# Patient Record
Sex: FEMALE | Race: WHITE | NOT HISPANIC OR LATINO | Employment: FULL TIME | URBAN - METROPOLITAN AREA
[De-identification: names, ages, dates, MRNs, and addresses within clinical notes are randomized per-mention and may not be internally consistent; named-entity substitution may affect disease eponyms.]

---

## 2017-07-04 ENCOUNTER — HOSPITAL ENCOUNTER (EMERGENCY)
Facility: MEDICAL CENTER | Age: 47
End: 2017-07-04
Attending: EMERGENCY MEDICINE
Payer: MEDICAID

## 2017-07-04 VITALS
TEMPERATURE: 98.2 F | HEART RATE: 71 BPM | HEIGHT: 63 IN | DIASTOLIC BLOOD PRESSURE: 68 MMHG | OXYGEN SATURATION: 98 % | SYSTOLIC BLOOD PRESSURE: 115 MMHG | RESPIRATION RATE: 14 BRPM

## 2017-07-04 DIAGNOSIS — M54.41 ACUTE BILATERAL LOW BACK PAIN WITH BILATERAL SCIATICA: ICD-10-CM

## 2017-07-04 DIAGNOSIS — M54.42 ACUTE BILATERAL LOW BACK PAIN WITH BILATERAL SCIATICA: ICD-10-CM

## 2017-07-04 PROCEDURE — 99283 EMERGENCY DEPT VISIT LOW MDM: CPT

## 2017-07-04 RX ORDER — CYCLOBENZAPRINE HCL 10 MG
10 TABLET ORAL 3 TIMES DAILY PRN
Qty: 30 TAB | Refills: 0 | Status: SHIPPED | OUTPATIENT
Start: 2017-07-04 | End: 2017-10-25 | Stop reason: SDUPTHER

## 2017-07-04 RX ORDER — METHYLPREDNISOLONE 4 MG/1
TABLET ORAL
Qty: 1 KIT | Refills: 0 | Status: SHIPPED | OUTPATIENT
Start: 2017-07-04 | End: 2017-08-23

## 2017-07-04 RX ORDER — TRAMADOL HYDROCHLORIDE 50 MG/1
50 TABLET ORAL EVERY 4 HOURS PRN
Qty: 30 TAB | Refills: 0 | Status: SHIPPED | OUTPATIENT
Start: 2017-07-04 | End: 2017-09-27 | Stop reason: SDUPTHER

## 2017-07-04 ASSESSMENT — PAIN SCALES - GENERAL: PAINLEVEL_OUTOF10: 7

## 2017-07-04 NOTE — ED AVS SNAPSHOT
StyleSaint Access Code: Activation code not generated  Current StyleSaint Status: Active    [employer]  A secure, online tool to manage your health information     [employer]’s StyleSaint® is a secure, online tool that connects you to your personalized health information from the privacy of your home -- day or night - making it very easy for you to manage your healthcare. Once the activation process is completed, you can even access your medical information using the StyleSaint aldair, which is available for free in the Apple [employer]air store or Google Play store.     StyleSaint provides the following levels of access (as shown below):   My Chart Features   [facility] Primary Care Doctor [facility]  Specialists [facility]  Urgent  Care Non-[facility]  Primary Care  Doctor   Email your healthcare team securely and privately 24/7 X X X X   Manage appointments: schedule your next appointment; view details of past/upcoming appointments X      Request prescription refills. X      View recent personal medical records, including lab and immunizations X X X X   View health record, including health history, allergies, medications X X X X   Read reports about your outpatient visits, procedures, consult and ER notes X X X X   See your discharge summary, which is a recap of your hospital and/or ER visit that includes your diagnosis, lab results, and care plan. X X       How to register for StyleSaint:  1. Go to  [email or web address].  2. Click on the Sign Up Now box, which takes you to the New Member Sign Up page. You will need to provide the following information:  a. Enter your StyleSaint Access Code exactly as it appears at the top of this page. (You will not need to use this code after you’ve completed the sign-up process. If you do not sign up before the expiration date, you must request a new code.)   b. Enter your date of birth.   c. Enter your home email address.   d. Click Submit, and follow the next screen’s instructions.  3. Create a StyleSaint ID. This will  be your [employer] login ID and cannot be changed, so think of one that is secure and easy to remember.  4. Create a [employer] password. You can change your password at any time.  5. Enter your Password Reset Question and Answer. This can be used at a later time if you forget your password.   6. Enter your e-mail address. This allows you to receive e-mail notifications when new information is available in [employer].  7. Click Sign Up. You can now view your health information.    For assistance activating your [employer] account, call [phone]

## 2017-07-04 NOTE — ED NOTES
Pt c/o low back pain s/p fall down stairs this past Saturday , hx of back surgery , 2009 , pt ambulatory to triage , pt drove self to ER

## 2017-07-04 NOTE — ED NOTES
Patient given discharge instructions, follow up information, and prescriptions x 3, verbalized understanding, ambulatory out of ED w/steady gait.

## 2017-07-04 NOTE — ED AVS SNAPSHOT
7/4/2017    Kristina Celaya Arun Random  [address] [place] [address]    Dear Kristina:    [facility] wants to ensure your discharge home is safe and you or your loved ones have had all of your questions answered regarding your care after you leave the hospital.    Below is a list of resources and contact information should you have any questions regarding your hospital stay, follow-up instructions, or active medical symptoms.    Questions or Concerns Regarding… Contact   Medical Questions Related to Your Discharge  (7 days a week, 8am-5pm) Contact a Nurse Care Coordinator   [phone]   Medical Questions Not Related to Your Discharge  (24 hours a day / 7 days a week)  Contact the Nurse Health Line   [phone]    Medications or Discharge Instructions Refer to your discharge packet   or contact your [facility] Primary Care Provider   [phone]   Follow-up Appointment(s) Schedule your appointment via [employer]   or contact Scheduling [phone]   Billing Review your statement via [employer]  or contact Billing [phone]   Medical Records Review your records via [employer]   or contact Medical Records [phone]     You may receive a telephone call within two days of discharge. This call is to make certain you understand your discharge instructions and have the opportunity to have any questions answered. You can also easily access your medical information, test results and upcoming appointments via the [employer] free online health management tool. You can learn more and sign up at [employer]. For assistance setting up your [employer] account, please call [phone].    Once again, we want to ensure your discharge home is safe and that you have a clear understanding of any next steps in your care. If you have any questions or concerns, please do not hesitate to contact us, we are here for you. Thank you for choosing [facility] for your healthcare needs.    Sincerely,    Your [facility] Healthcare Team

## 2017-07-04 NOTE — DISCHARGE INSTRUCTIONS
Sciatica  Sciatica is pain, weakness, numbness, or tingling along the path of the sciatic nerve. The nerve starts in the lower back and runs down the back of each leg. The nerve controls the muscles in the lower leg and in the back of the knee, while also providing sensation to the back of the thigh, lower leg, and the sole of your foot. Sciatica is a symptom of another medical condition. For instance, nerve damage or certain conditions, such as a herniated disk or bone spur on the spine, pinch or put pressure on the sciatic nerve. This causes the pain, weakness, or other sensations normally associated with sciatica. Generally, sciatica only affects one side of the body.  CAUSES   · Herniated or slipped disc.  · Degenerative disk disease.  · A pain disorder involving the narrow muscle in the buttocks (piriformis syndrome).  · Pelvic injury or fracture.  · Pregnancy.  · Tumor (rare).  SYMPTOMS   Symptoms can vary from mild to very severe. The symptoms usually travel from the low back to the buttocks and down the back of the leg. Symptoms can include:  · Mild tingling or dull aches in the lower back, leg, or hip.  · Numbness in the back of the calf or sole of the foot.  · Burning sensations in the lower back, leg, or hip.  · Sharp pains in the lower back, leg, or hip.  · Leg weakness.  · Severe back pain inhibiting movement.  These symptoms may get worse with coughing, sneezing, laughing, or prolonged sitting or standing. Also, being overweight may worsen symptoms.  DIAGNOSIS   Your caregiver will perform a physical exam to look for common symptoms of sciatica. He or she may ask you to do certain movements or activities that would trigger sciatic nerve pain. Other tests may be performed to find the cause of the sciatica. These may include:  · Blood tests.  · X-rays.  · Imaging tests, such as an MRI or CT scan.  TREATMENT   Treatment is directed at the cause of the sciatic pain. Sometimes, treatment is not necessary  and the pain and discomfort goes away on its own. If treatment is needed, your caregiver may suggest:  · Over-the-counter medicines to relieve pain.  · Prescription medicines, such as anti-inflammatory medicine, muscle relaxants, or narcotics.  · Applying heat or ice to the painful area.  · Steroid injections to lessen pain, irritation, and inflammation around the nerve.  · Reducing activity during periods of pain.  · Exercising and stretching to strengthen your abdomen and improve flexibility of your spine. Your caregiver may suggest losing weight if the extra weight makes the back pain worse.  · Physical therapy.  · Surgery to eliminate what is pressing or pinching the nerve, such as a bone spur or part of a herniated disk.  HOME CARE INSTRUCTIONS   · Only take over-the-counter or prescription medicines for pain or discomfort as directed by your caregiver.  · Apply ice to the affected area for 20 minutes, 3-4 times a day for the first 48-72 hours. Then try heat in the same way.  · Exercise, stretch, or perform your usual activities if these do not aggravate your pain.  · Attend physical therapy sessions as directed by your caregiver.  · Keep all follow-up appointments as directed by your caregiver.  · Do not wear high heels or shoes that do not provide proper support.  · Check your mattress to see if it is too soft. A firm mattress may lessen your pain and discomfort.  SEEK IMMEDIATE MEDICAL CARE IF:   · You lose control of your bowel or bladder (incontinence).  · You have increasing weakness in the lower back, pelvis, buttocks, or legs.  · You have redness or swelling of your back.  · You have a burning sensation when you urinate.  · You have pain that gets worse when you lie down or awakens you at night.  · Your pain is worse than you have experienced in the past.  · Your pain is lasting longer than 4 weeks.  · You are suddenly losing weight without reason.  MAKE SURE YOU:  · Understand these  instructions.  · Will watch your condition.  · Will get help right away if you are not doing well or get worse.     This information is not intended to replace advice given to you by your health care provider. Make sure you discuss any questions you have with your health care provider.     Document Released: 12/12/2002 Document Revised: 06/18/2013 Document Reviewed: 04/28/2013  [employer] Interactive Patient Education ©2016 [employer]

## 2017-07-04 NOTE — ED PROVIDER NOTES
ED Provider Note    CHIEF COMPLAINT  Chief Complaint   Patient presents with   • T-5000 FALL   • Back Pain       HPI  Kristina Belia Ash is a 47 y.o. female who presents with report that the patient is moving and became tired and fell down half a flight of stairs this past Saturday. Chart he wears a back brace constantly and has a history of laminectomy back in 2009 by Dr. Gross. She currently drove herself to the emergency department in need of something for pain. She states that she normally has been given Dilaudid in the past for her chronic back pain but is out of this medication for many weeks because she is in between doctors. She no longer has a pain management specialist    ROS  Pertinent negative for distal numbness or paresthesias    PAST MEDICAL HISTORY  Past Medical History   Diagnosis Date   • Dizziness and giddiness    • Cough    • Displacement of lumbar intervertebral disc without myelopathy      right sided sciatica   • Tobacco use disorder    • Bipolar I disorder, most recent episode (or current) unspecified      untreated     • Irregular menstrual cycle      menorrhagia lifelong   • Pneumonia hx.   • Backache S/P L4-5     decompressive laminectomies with bilateral foramionotomies   • Pap smear  1990   • Screening mammogram  1998   • Need for TD vaccine  needed   • Lumbar pain  MRI   9/2008     MRI (9/2008) Left lateral disk protusion L5-S1 with left S1 root compression,small central disk  protusion at the L4-L5 with effacement of the ventral sac and small radial tear of the annulus as well as mild right lateral disk bulge at L4-L5 with neuro foraminal stenosis.   • Osteoarthrosis involving, or with mention of more than one site, but not specified as generalized, multiple sites        FAMILY HISTORY  Family History   Problem Relation Age of Onset   • Diabetes Maternal Grandmother        SOCIAL HISTORY   reports that she has been smoking Cigarettes.  She has a 15 pack-year smoking history. She  "does not have any smokeless tobacco history on file. She reports that she drinks alcohol. She reports that she does not use illicit drugs.    SURGICAL HISTORY  Past Surgical History   Procedure Laterality Date   • Lumbar laminectomy diskectomy  3/18/2009     Performed by RIP GAMA at SURGERY [facility]S   • Tubal coagulation laparoscopic bilateral         CURRENT MEDICATIONS  Home Medications     **Home medications have not yet been reviewed for this encounter**          ALLERGIES  Allergies   Allergen Reactions   • Sulfa Drugs Hives     Causes angioderma and hives       PHYSICAL EXAM  VITAL SIGNS: /67 mmHg  Pulse 98  Temp(Src) 36.8 °C (98.2 °F)  Resp 18  Ht 1.6 m (5' 2.99\")  SpO2 97%  LMP 09/20/2009   Constitutional: Well developed, Well nourished, No acute distress, Non-toxic appearance.   Skin:   Back: Tender lower lumbar spine midline with some paraspinal spasm   Extremities:   Musculoskeletal:   Neurologic: Neurologically intact, positive straight leg raise bilaterally at 20°, normal reflexes, antalgic gait  Psychiatric:       COURSE & MEDICAL DECISION MAKING  Pertinent Labs & Imaging studies reviewed. (See chart for details)  No imaging was required of this patient who has acute on chronic back pain today and does not have any medications at home to take care of pain and spasm. I wrote her for Ultram and along with Medrol Dosepak and Flexeril to treat pain, spasm and inflammation. She is discharged in stable condition with instructions to obtain a primary care provider and return if any significant change in symptoms. She does not have cauda equina syndrome by this presentation    FINAL IMPRESSION  1. Acute bilateral low back pain with bilateral sciatica            Electronically signed by: Geovani Beckwith, 7/4/2017 1:38 PM              "

## 2017-07-04 NOTE — ED AVS SNAPSHOT
Home Care Instructions                                                                                                                Kristina Dias Random   MRN: [id number]    Department:  [facility], Emergency Dept   Date of Visit:  7/4/2017            [facility], Emergency Dept    [address]    [place] [address]    Phone:  [phone]      You were seen by     Geovani Beckwith M.D.      Your Diagnosis Was     Acute bilateral low back pain with bilateral sciatica     M54.42, M54.41       Medication Information     Review all of your home medications and newly ordered medications with your primary doctor and/or pharmacist as soon as possible. Follow medication instructions as directed by your doctor and/or pharmacist.     Please keep your complete medication list with you and share with your physician. Update the information when medications are discontinued, doses are changed, or new medications (including over-the-counter products) are added; and carry medication information at all times in the event of emergency situations.               Medication List      START taking these medications        Instructions    Morning Afternoon Evening Bedtime    cyclobenzaprine 10 MG Tabs   Commonly known as:  FLEXERIL        Take 1 Tab by mouth 3 times a day as needed.   Dose:  10 mg                        MethylPREDNISolone 4 MG Tbpk   Commonly known as:  MEDROL DOSEPAK        Use as directed                        tramadol 50 MG Tabs   Commonly known as:  ULTRAM        Take 1 Tab by mouth every four hours as needed.   Dose:  50 mg                          ASK your doctor about these medications        Instructions    Morning Afternoon Evening Bedtime    hydrocodone-acetaminophen 5-325 MG Tabs per tablet   Commonly known as:  NORCO        Take 1-2 Tabs by mouth every four hours as needed.   Dose:  1-2 Tab                        ondansetron 4 MG Tabs tablet   Commonly known as:   ZOFRAN        Take 1 Tab by mouth every four hours as needed.   Dose:  4 mg                             Where to Get Your Medications      You can get these medications from any pharmacy     Bring a paper prescription for each of these medications    - cyclobenzaprine 10 MG Tabs  - MethylPREDNISolone 4 MG Tbpk  - tramadol 50 MG Tabs              Discharge Instructions       Sciatica  Sciatica is pain, weakness, numbness, or tingling along the path of the sciatic nerve. The nerve starts in the lower back and runs down the back of each leg. The nerve controls the muscles in the lower leg and in the back of the knee, while also providing sensation to the back of the thigh, lower leg, and the sole of your foot. Sciatica is a symptom of another medical condition. For instance, nerve damage or certain conditions, such as a herniated disk or bone spur on the spine, pinch or put pressure on the sciatic nerve. This causes the pain, weakness, or other sensations normally associated with sciatica. Generally, sciatica only affects one side of the body.  CAUSES   · Herniated or slipped disc.  · Degenerative disk disease.  · A pain disorder involving the narrow muscle in the buttocks (piriformis syndrome).  · Pelvic injury or fracture.  · Pregnancy.  · Tumor (rare).  SYMPTOMS   Symptoms can vary from mild to very severe. The symptoms usually travel from the low back to the buttocks and down the back of the leg. Symptoms can include:  · Mild tingling or dull aches in the lower back, leg, or hip.  · Numbness in the back of the calf or sole of the foot.  · Burning sensations in the lower back, leg, or hip.  · Sharp pains in the lower back, leg, or hip.  · Leg weakness.  · Severe back pain inhibiting movement.  These symptoms may get worse with coughing, sneezing, laughing, or prolonged sitting or standing. Also, being overweight may worsen symptoms.  DIAGNOSIS   Your caregiver will perform a physical exam to look for common symptoms  of sciatica. He or she may ask you to do certain movements or activities that would trigger sciatic nerve pain. Other tests may be performed to find the cause of the sciatica. These may include:  · Blood tests.  · X-rays.  · Imaging tests, such as an MRI or CT scan.  TREATMENT   Treatment is directed at the cause of the sciatic pain. Sometimes, treatment is not necessary and the pain and discomfort goes away on its own. If treatment is needed, your caregiver may suggest:  · Over-the-counter medicines to relieve pain.  · Prescription medicines, such as anti-inflammatory medicine, muscle relaxants, or narcotics.  · Applying heat or ice to the painful area.  · Steroid injections to lessen pain, irritation, and inflammation around the nerve.  · Reducing activity during periods of pain.  · Exercising and stretching to strengthen your abdomen and improve flexibility of your spine. Your caregiver may suggest losing weight if the extra weight makes the back pain worse.  · Physical therapy.  · Surgery to eliminate what is pressing or pinching the nerve, such as a bone spur or part of a herniated disk.  HOME CARE INSTRUCTIONS   · Only take over-the-counter or prescription medicines for pain or discomfort as directed by your caregiver.  · Apply ice to the affected area for 20 minutes, 3-4 times a day for the first 48-72 hours. Then try heat in the same way.  · Exercise, stretch, or perform your usual activities if these do not aggravate your pain.  · Attend physical therapy sessions as directed by your caregiver.  · Keep all follow-up appointments as directed by your caregiver.  · Do not wear high heels or shoes that do not provide proper support.  · Check your mattress to see if it is too soft. A firm mattress may lessen your pain and discomfort.  SEEK IMMEDIATE MEDICAL CARE IF:   · You lose control of your bowel or bladder (incontinence).  · You have increasing weakness in the lower back, pelvis, buttocks, or legs.  · You  have redness or swelling of your back.  · You have a burning sensation when you urinate.  · You have pain that gets worse when you lie down or awakens you at night.  · Your pain is worse than you have experienced in the past.  · Your pain is lasting longer than 4 weeks.  · You are suddenly losing weight without reason.  MAKE SURE YOU:  · Understand these instructions.  · Will watch your condition.  · Will get help right away if you are not doing well or get worse.     This information is not intended to replace advice given to you by your health care provider. Make sure you discuss any questions you have with your health care provider.     Document Released: 12/12/2002 Document Revised: 06/18/2013 Document Reviewed: 04/28/2013  [employer] Interactive Patient Education ©2016 [employer]            Patient Information     Patient Information    Following emergency treatment: all patient requiring follow-up care must return either to a private physician or a clinic if your condition worsens before you are able to obtain further medical attention, please return to the emergency room.     Billing Information    At [facility], we work to make the billing process streamlined for our patients.  Our Representatives are here to answer any questions you may have regarding your hospital bill.  If you have insurance coverage and have supplied your insurance information to us, we will submit a claim to your insurer on your behalf.  Should you have any questions regarding your bill, we can be reached online or by phone as follows:  Online: You are able pay your bills online or live chat with our representatives about any billing questions you may have. We are here to help Monday - Friday from 8:00am to 7:30pm and 9:00am - 12:00pm on Saturdays.  Please visit [email or web address]  for more information.   Phone:  [phone] or [phone]    Please note that your emergency physician, surgeon,  pathologist, radiologist, anesthesiologist, and other specialists are not employed by [facility] and will therefore bill separately for their services.  Please contact them directly for any questions concerning their bills at the numbers below:     Emergency Physician Services:  [phone]  [facility]:  [phone]  [facility]:  [phone]  [facility]:  [phone]    1. Your final bill may vary from the amount quoted upon discharge if all procedures are not complete at that time, or if your doctor has additional procedures of which we are not aware. You will receive an additional bill if you return to the Emergency Department at On license of [facility] for suture removal regardless of the facility of which the sutures were placed.     2. Please arrange for settlement of this account at the emergency registration.    3. All self-pay accounts are due in full at the time of treatment.  If you are unable to meet this obligation then payment is expected within 4-5 days.     4. If you have had radiology studies (CT, X-ray, Ultrasound, MRI), you have received a preliminary result during your emergency department visit. Please contact the radiology department [phone] to receive a copy of your final result. Please discuss the Final result with your primary physician or with the follow up physician provided.     Crisis Hotline:  [place] Crisis Hotline:  8-664-XGWBCCD or 1-832.309.7501  [place] Crisis Hotline:    1-573.121.8911 or 721-251-7542         ED Discharge Follow Up Questions    1. In order to provide you with very good care, we would like to follow up with a phone call in the next few days.  May we have your permission to contact you?     YES /  NO    2. What is the best phone number to call you? (       )_____-__________    3. What is the best time to call you?      Morning  /  Afternoon  /  Evening                   Patient Signature:   ____________________________________________________________    Date:  ____________________________________________________________

## 2017-07-20 ENCOUNTER — OFFICE VISIT (OUTPATIENT)
Dept: URGENT CARE | Facility: PHYSICIAN GROUP | Age: 47
End: 2017-07-20
Payer: MEDICAID

## 2017-07-20 VITALS
TEMPERATURE: 97.9 F | SYSTOLIC BLOOD PRESSURE: 108 MMHG | OXYGEN SATURATION: 96 % | DIASTOLIC BLOOD PRESSURE: 66 MMHG | HEIGHT: 63 IN | HEART RATE: 90 BPM

## 2017-07-20 DIAGNOSIS — R42 VERTIGO: ICD-10-CM

## 2017-07-20 PROCEDURE — 99214 OFFICE O/P EST MOD 30 MIN: CPT | Performed by: NURSE PRACTITIONER

## 2017-07-20 RX ORDER — MECLIZINE HYDROCHLORIDE 25 MG/1
25 TABLET ORAL 3 TIMES DAILY PRN
Qty: 30 TAB | Refills: 0 | Status: SHIPPED | OUTPATIENT
Start: 2017-07-20 | End: 2017-08-23

## 2017-07-20 ASSESSMENT — ENCOUNTER SYMPTOMS
FOCAL WEAKNESS: 0
NAUSEA: 0
MYALGIAS: 0
BLURRED VISION: 1
TINGLING: 0
SORE THROAT: 0
NECK PAIN: 0
WEAKNESS: 0
CONSTIPATION: 0
HEADACHES: 1
SENSORY CHANGE: 0
ABDOMINAL PAIN: 0
NERVOUS/ANXIOUS: 0
COUGH: 0
ORTHOPNEA: 0
DOUBLE VISION: 0
SPEECH CHANGE: 0
CHILLS: 0
PALPITATIONS: 0
DEPRESSION: 0
DIZZINESS: 1
VOMITING: 0
HEARTBURN: 0
FEVER: 0
DIARRHEA: 0
PHOTOPHOBIA: 0
EYE REDNESS: 0
EYE PAIN: 0
EYE DISCHARGE: 0
BACK PAIN: 0
SHORTNESS OF BREATH: 0

## 2017-07-21 NOTE — PROGRESS NOTES
"Subjective:      Kristina Ash is a 47 y.o. female who presents with Dizziness            Dizziness  Associated symptoms include headaches. Pertinent negatives include no abdominal pain, chest pain, chills, congestion, coughing, fever, myalgias, nausea, neck pain, sore throat, vomiting or weakness.   Kristina is a 47 year old female who is here for intermittent dizziness x \"months\" with intermittent vision change x 1 episode. States dizziness has worsened in last 2 days. Denies n/v. States dizziness is positional and feels as if she will fall over. Denies HA, weakness, CP, SOB, confusion. States has h/o seasonal allergies but not experiencing nasal congestion or runny nose, c/o dry scratchy throat with intermittent sore throat. Denies fever or recent URI illness. States \"under a huge amount of stress lately\". Denies depression. Takes no prescribed meds at this time or OTC meds for symptoms. Has h/o dizziness. Wears prescription glasses, last eye exam 1 1/2 years ago.    PMH:  has a past medical history of Dizziness and giddiness; Cough; Displacement of lumbar intervertebral disc without myelopathy; Tobacco use disorder; Bipolar I disorder, most recent episode (or current) unspecified; Irregular menstrual cycle; Pneumonia (hx.); Backache (S/P L4-5); Pap smear ( 1990); Screening mammogram ( 1998); Need for TD vaccine ( needed); Lumbar pain ( MRI   9/2008); and Osteoarthrosis involving, or with mention of more than one site, but not specified as generalized, multiple sites.  MEDS:   Current outpatient prescriptions:   •  meclizine (ANTIVERT) 25 MG Tab, Take 1 Tab by mouth 3 times a day as needed for Dizziness., Disp: 30 Tab, Rfl: 0  •  tramadol (ULTRAM) 50 MG Tab, Take 1 Tab by mouth every four hours as needed., Disp: 30 Tab, Rfl: 0  •  cyclobenzaprine (FLEXERIL) 10 MG Tab, Take 1 Tab by mouth 3 times a day as needed., Disp: 30 Tab, Rfl: 0  •  PREDNISOLONE PO, Take  by mouth., Disp: , Rfl:   •  MethylPREDNISolone " "(MEDROL DOSEPAK) 4 MG Tablet Therapy Pack, Use as directed, Disp: 1 Kit, Rfl: 0  •  ondansetron (ZOFRAN) 4 MG TABS tablet, Take 1 Tab by mouth every four hours as needed., Disp: 20 Tab, Rfl: 0  •  hydrocodone-acetaminophen (NORCO) 5-325 MG TABS per tablet, Take 1-2 Tabs by mouth every four hours as needed., Disp: 20 Tab, Rfl: 0  ALLERGIES:   Allergies   Allergen Reactions   • Sulfa Drugs Hives     Causes angioderma and hives     SURGHX:   Past Surgical History   Procedure Laterality Date   • Lumbar laminectomy diskectomy  3/18/2009     Performed by RIP GAMA at SURGERY [facility]S   • Tubal coagulation laparoscopic bilateral       SOCHX:  reports that she has been smoking Cigarettes.  She has a 15 pack-year smoking history. She has never used smokeless tobacco. She reports that she drinks alcohol. She reports that she does not use illicit drugs.  FH: Family history was reviewed, no pertinent findings to report      Review of Systems   Constitutional: Negative for fever, chills and malaise/fatigue.   HENT: Negative for congestion, ear pain and sore throat.    Eyes: Positive for blurred vision. Negative for double vision, photophobia, pain, discharge and redness.   Respiratory: Negative for cough and shortness of breath.    Cardiovascular: Negative for chest pain, palpitations and orthopnea.   Gastrointestinal: Negative for heartburn, nausea, vomiting, abdominal pain, diarrhea and constipation.   Musculoskeletal: Negative for myalgias, back pain and neck pain.   Neurological: Positive for dizziness and headaches. Negative for tingling, sensory change, speech change, focal weakness and weakness.   Endo/Heme/Allergies: Positive for environmental allergies.   Psychiatric/Behavioral: Negative for depression. The patient is not nervous/anxious.    All other systems reviewed and are negative.         Objective:     /66 mmHg  Pulse 90  Temp(Src) 36.6 °C (97.9 °F)  Ht 1.6 m (5' 2.99\")  SpO2 96%  LMP " 09/20/2009     Physical Exam   Constitutional: She is oriented to person, place, and time. Vital signs are normal. She appears well-developed and well-nourished. She is active and cooperative.  Non-toxic appearance. She does not have a sickly appearance. She does not appear ill. No distress.   HENT:   Head: Normocephalic.   Right Ear: External ear and ear canal normal. Tympanic membrane is injected.   Left Ear: External ear and ear canal normal. Tympanic membrane is injected.   Nose: Mucosal edema present. No rhinorrhea or sinus tenderness.   Mouth/Throat: Uvula is midline and oropharynx is clear and moist. Mucous membranes are dry. No uvula swelling.   Eyes: Conjunctivae and EOM are normal. Pupils are equal, round, and reactive to light.   Neck: Normal range of motion. Neck supple.   Cardiovascular: Normal rate.    Pulmonary/Chest: Effort normal.   Musculoskeletal: Normal range of motion.   Lymphadenopathy:     She has no cervical adenopathy.   Neurological: She is alert and oriented to person, place, and time. She has normal strength. No cranial nerve deficit or sensory deficit. Coordination and gait normal. GCS eye subscore is 4. GCS verbal subscore is 5. GCS motor subscore is 6.   Skin: Skin is warm and dry. She is not diaphoretic.   Psychiatric: She has a normal mood and affect. Her speech is normal and behavior is normal. Judgment and thought content normal. She is not actively hallucinating. Cognition and memory are normal. She is attentive.   Vitals reviewed.              Assessment/Plan:     1. Vertigo    - meclizine (ANTIVERT) 25 MG Tab; Take 1 Tab by mouth 3 times a day as needed for Dizziness.  Dispense: 30 Tab; Refill: 0            Increase water intake  Get rest  May use Ibuprofen/Tylenol prn for any fever, body aches or ear pain  May take Claritin for seasonal allergy symptoms prn, if any  Monitor for increased pain in ear, muffled hearing, ear discharge, fever-recheck  Monitor for continued dizziness  with n/v,severe HA, vision change, weakness, speech change, confusion - need to call 911

## 2017-08-02 ENCOUNTER — APPOINTMENT (OUTPATIENT)
Dept: RADIOLOGY | Facility: IMAGING CENTER | Age: 47
End: 2017-08-02
Attending: PHYSICIAN ASSISTANT
Payer: MEDICAID

## 2017-08-02 ENCOUNTER — HOSPITAL ENCOUNTER (OUTPATIENT)
Facility: MEDICAL CENTER | Age: 47
End: 2017-08-02
Attending: PHYSICIAN ASSISTANT
Payer: MEDICAID

## 2017-08-02 ENCOUNTER — OFFICE VISIT (OUTPATIENT)
Dept: URGENT CARE | Facility: PHYSICIAN GROUP | Age: 47
End: 2017-08-02
Payer: MEDICAID

## 2017-08-02 VITALS
RESPIRATION RATE: 16 BRPM | OXYGEN SATURATION: 96 % | BODY MASS INDEX: 25.69 KG/M2 | DIASTOLIC BLOOD PRESSURE: 80 MMHG | TEMPERATURE: 101 F | HEIGHT: 63 IN | HEART RATE: 100 BPM | WEIGHT: 145 LBS | SYSTOLIC BLOOD PRESSURE: 132 MMHG

## 2017-08-02 DIAGNOSIS — R82.998 LEUKOCYTES IN URINE: ICD-10-CM

## 2017-08-02 DIAGNOSIS — R50.9 FEVER, UNSPECIFIED FEVER CAUSE: ICD-10-CM

## 2017-08-02 DIAGNOSIS — S39.012A STRAIN OF LUMBAR PARASPINAL MUSCLE, INITIAL ENCOUNTER: ICD-10-CM

## 2017-08-02 LAB
APPEARANCE UR: CLEAR
BILIRUB UR STRIP-MCNC: NORMAL MG/DL
COLOR UR AUTO: NORMAL
GLUCOSE UR STRIP.AUTO-MCNC: NORMAL MG/DL
KETONES UR STRIP.AUTO-MCNC: NORMAL MG/DL
LEUKOCYTE ESTERASE UR QL STRIP.AUTO: NORMAL
NITRITE UR QL STRIP.AUTO: NORMAL
PH UR STRIP.AUTO: 6 [PH] (ref 5–8)
PROT UR QL STRIP: NORMAL MG/DL
RBC UR QL AUTO: NORMAL
SP GR UR STRIP.AUTO: 1.01
UROBILINOGEN UR STRIP-MCNC: NORMAL MG/DL

## 2017-08-02 PROCEDURE — 72100 X-RAY EXAM L-S SPINE 2/3 VWS: CPT | Performed by: INTERNAL MEDICINE

## 2017-08-02 PROCEDURE — 87086 URINE CULTURE/COLONY COUNT: CPT

## 2017-08-02 PROCEDURE — 81002 URINALYSIS NONAUTO W/O SCOPE: CPT | Performed by: PHYSICIAN ASSISTANT

## 2017-08-02 PROCEDURE — 99214 OFFICE O/P EST MOD 30 MIN: CPT | Mod: 25 | Performed by: PHYSICIAN ASSISTANT

## 2017-08-02 RX ORDER — CIPROFLOXACIN 500 MG/1
500 TABLET, FILM COATED ORAL 2 TIMES DAILY
Qty: 14 TAB | Refills: 0 | Status: SHIPPED | OUTPATIENT
Start: 2017-08-02 | End: 2017-08-09

## 2017-08-02 RX ORDER — KETOROLAC TROMETHAMINE 30 MG/ML
60 INJECTION, SOLUTION INTRAMUSCULAR; INTRAVENOUS ONCE
Status: COMPLETED | OUTPATIENT
Start: 2017-08-02 | End: 2017-08-02

## 2017-08-02 RX ORDER — CYCLOBENZAPRINE HCL 10 MG
10 TABLET ORAL 3 TIMES DAILY PRN
Qty: 30 TAB | Refills: 0 | Status: SHIPPED | OUTPATIENT
Start: 2017-08-02 | End: 2017-08-23

## 2017-08-02 RX ORDER — TRAMADOL HYDROCHLORIDE 50 MG/1
50 TABLET ORAL EVERY 4 HOURS PRN
Qty: 30 TAB | Refills: 0 | Status: SHIPPED | OUTPATIENT
Start: 2017-08-02 | End: 2017-08-23

## 2017-08-02 RX ADMIN — KETOROLAC TROMETHAMINE 60 MG: 30 INJECTION, SOLUTION INTRAMUSCULAR; INTRAVENOUS at 13:49

## 2017-08-02 ASSESSMENT — ENCOUNTER SYMPTOMS
FEVER: 0
NECK PAIN: 0
NAUSEA: 0
FALLS: 0
VOMITING: 0
FOCAL WEAKNESS: 0
FLANK PAIN: 0
BRUISES/BLEEDS EASILY: 0
CHILLS: 0
SHORTNESS OF BREATH: 0
PALPITATIONS: 0
BACK PAIN: 1
BLOOD IN STOOL: 0
ABDOMINAL PAIN: 0
DIARRHEA: 0
SENSORY CHANGE: 0
CONSTIPATION: 0
TINGLING: 0

## 2017-08-02 NOTE — MR AVS SNAPSHOT
"        Kristina Dias Random   2017 12:35 PM   Office Visit   MRN: [id number]    Department:  [facility]   Dept Phone:  [phone]    Description:  Female : 1970   Provider:  Shemar Maki PA-C           Reason for Visit     Back Pain low back pain, ( 5 buldging disc) x 2 days      Allergies as of 2017     Allergen Noted Reactions    Sulfa Drugs 06/15/2009   Hives    Causes angioderma and hives      You were diagnosed with     Strain of lumbar paraspinal muscle, initial encounter   [376335]       Fever, unspecified fever cause   [3922474]       Leukocytes in urine   [[id number]]         Vital Signs     Blood Pressure Pulse Temperature Respirations Height Weight    132/80 mmHg 100 38.3 °C (101 °F) 16 1.6 m (5' 3\") 65.772 kg (145 lb)    Body Mass Index Oxygen Saturation Last Menstrual Period Smoking Status          25.69 kg/m2 96% 2009 Current Every Day Smoker        Basic Information     Date Of Birth Sex Race Ethnicity Preferred Language    1970 Female White Non- English      Problem List              ICD-10-CM Priority Class Noted - Resolved    Dizziness and giddiness R42   2009 - Present    Cough R05   2009 - Present    Tobacco use disorder F17.200   2009 - Present    Bipolar I disorder, most recent episode (or current) unspecified F31.9   2009 - Present    Osteoarthritis of multiple joints M15.9   2009 - Present      Health Maintenance        Date Due Completion Dates    IMM DTaP/Tdap/Td Vaccine (1 - Tdap) 1987 ---    IMM PNEUMOCOCCAL 19-64 (ADULT) MEDIUM RISK SERIES (1 of 1 - PPSV23) 1987 ---    PAP SMEAR 1989 ---    MAMMOGRAM 2008 ---    IMM INFLUENZA (1) 2017 ---            Current Immunizations     No immunizations on file.      Below and/or attached are the medications your provider expects you to take. Review all of your home medications and newly ordered medications with your provider and/or pharmacist. Follow " medication instructions as directed by your provider and/or pharmacist. Please keep your medication list with you and share with your provider. Update the information when medications are discontinued, doses are changed, or new medications (including over-the-counter products) are added; and carry medication information at all times in the event of emergency situations     Allergies:  SULFA DRUGS - Hives               Medications  Valid as of: August 02, 2017 -  2:05 PM    Generic Name Brand Name Tablet Size Instructions for use    Ciprofloxacin HCl (Tab) CIPRO 500 MG Take 1 Tab by mouth 2 times a day for 7 days.        Cyclobenzaprine HCl (Tab) FLEXERIL 10 MG Take 1 Tab by mouth 3 times a day as needed.        Cyclobenzaprine HCl (Tab) FLEXERIL 10 MG Take 1 Tab by mouth 3 times a day as needed.        Hydrocodone-Acetaminophen (Tab) NORCO 5-325 MG Take 1-2 Tabs by mouth every four hours as needed.        Meclizine HCl (Tab) ANTIVERT 25 MG Take 1 Tab by mouth 3 times a day as needed for Dizziness.        MethylPREDNISolone (Tablet Therapy Pack) MEDROL DOSEPAK 4 MG Use as directed        Ondansetron HCl (Tab) ZOFRAN 4 MG Take 1 Tab by mouth every four hours as needed.        PrednisoLONE   Take  by mouth.        TraMADol HCl (Tab) ULTRAM 50 MG Take 1 Tab by mouth every four hours as needed.        TraMADol HCl (Tab) ULTRAM 50 MG Take 1 Tab by mouth every four hours as needed.        .                 Medicines prescribed today were sent to:     [id number] [address] [address]    Phone: [phone] Fax: [phone]    Open 24 Hours?: No      Medication refill instructions:       If your prescription bottle indicates you have medication refills left, it is not necessary to call your provider’s office. Please contact your pharmacy and they will refill your medication.    If your prescription bottle indicates you do not have any refills left, you may  request refills at any time through one of the following ways: The online [employer] system (except Urgent Care), by calling your provider’s office, or by asking your pharmacy to contact your provider’s office with a refill request. Medication refills are processed only during regular business hours and may not be available until the next business day. Your provider may request additional information or to have a follow-up visit with you prior to refilling your medication.   *Please Note: Medication refills are assigned a new Rx number when refilled electronically. Your pharmacy may indicate that no refills were authorized even though a new prescription for the same medication is available at the pharmacy. Please request the medicine by name with the pharmacy before contacting your provider for a refill.        Your To Do List     Future Labs/Procedures Complete By Expires    DX-LUMBAR SPINE-2 OR 3 VIEWS  As directed 8/2/2018    URINE CULTURE(NEW)  As directed 8/2/2018      Referral     A referral request has been sent to our patient care coordination department. Please allow 3-5 business days for us to process this request and contact you either by phone or mail. If you do not hear from us by the 5th business day, please call us at [phone].           [employer] Access Code: Activation code not generated  Current [employer] Status: Active          Quit Tobacco Information     Do you want to quit using tobacco?    Quitting tobacco decreases risks of cancer, heart and lung disease, increases life expectancy, improves sense of taste and smell, and increases spending money, among other benefits.    If you are thinking about quitting, we can help.  • [facility] Quit Tobacco Program: [phone]  o Program occurs weekly for four weeks and includes pharmacist consultation on products to support quitting smoking or chewing tobacco. A provider referral is needed for pharmacist consultation.  • Tobacco Users Help Hotline:  1-800-QUIT-NOW (133-2312) or https://[place].quitlogix.org/  o Free, confidential telephone and online coaching for [place] residents. Sessions are designed on a schedule that is convenient for you. Eligible clients receive free nicotine replacement therapy.  • Nationally: www.smokefree.gov  o Information and professional assistance to support both immediate and long-term needs as you become, and remain, a non-smoker. Smokefree.gov allows you to choose the help that best fits your needs.

## 2017-08-02 NOTE — PROGRESS NOTES
Subjective:      Kristina Ash is a 47 y.o. female who presents with Back Pain            Back Pain  Pertinent negatives include no abdominal pain, chest pain, dysuria, fever or tingling.   notes constant pain to back, worse over last 2d, denies new trauma/injuries, was doing lot's of standing yesterday which worsened pain last night, tried ice/heat, had fall in July was tx'ed w/ meds then, called neurologist and waiting for a call back, denies fever/chills, denies dysuria/hematuria/freq/urg, denies PMH of UTI, remote PMH of UTI and pyelo and states this feels more like her typical back pain. Denies abd pain. Denies soreythroat/earpain or cough, c/o mild HA. Pain keeps awake. C/o sciatic pain, on right side, c/o radiation down to right knee, c/o some tingling to feet, this is normal constellation of her back pain, constant pain w/ occ worsening. Denies IV drug use.    Used to have pain contract, insurance ran out, has been getting by on some from ER and home supply. Last back surgery was in 2009. Last time w/ neuro or pain MD was about one year ago, big increase in back flare ups and pain since then. Does stretching, ice/ heat, no OTC regularly. Tried OTC tylenol lately.      Denies saddle anesthesia, incontinence of urine or bowel, retention of urine or bowel, also denies numbness/tingling or weakness to UE/LE.        Review of Systems   Constitutional: Negative for fever and chills.   Respiratory: Negative for shortness of breath.    Cardiovascular: Negative for chest pain, palpitations and leg swelling.   Gastrointestinal: Negative for nausea, vomiting, abdominal pain, diarrhea, constipation, blood in stool and melena.   Genitourinary: Negative for dysuria, urgency, frequency, hematuria and flank pain.   Musculoskeletal: Positive for back pain. Negative for falls and neck pain.   Skin: Negative for rash.   Neurological: Negative for tingling, sensory change and focal weakness.   Endo/Heme/Allergies:  Does not bruise/bleed easily.     PMH:  has a past medical history of Dizziness and giddiness; Cough; Displacement of lumbar intervertebral disc without myelopathy; Tobacco use disorder; Bipolar I disorder, most recent episode (or current) unspecified; Irregular menstrual cycle; Pneumonia (hx.); Backache (S/P L4-5); Pap smear ( 1990); Screening mammogram ( 1998); Need for TD vaccine ( needed); Lumbar pain ( MRI   9/2008); and Osteoarthrosis involving, or with mention of more than one site, but not specified as generalized, multiple sites.  MEDS:   Current outpatient prescriptions:   •  meclizine (ANTIVERT) 25 MG Tab, Take 1 Tab by mouth 3 times a day as needed for Dizziness., Disp: 30 Tab, Rfl: 0  •  tramadol (ULTRAM) 50 MG Tab, Take 1 Tab by mouth every four hours as needed., Disp: 30 Tab, Rfl: 0  •  cyclobenzaprine (FLEXERIL) 10 MG Tab, Take 1 Tab by mouth 3 times a day as needed., Disp: 30 Tab, Rfl: 0  •  PREDNISOLONE PO, Take  by mouth., Disp: , Rfl:   •  MethylPREDNISolone (MEDROL DOSEPAK) 4 MG Tablet Therapy Pack, Use as directed, Disp: 1 Kit, Rfl: 0  •  ondansetron (ZOFRAN) 4 MG TABS tablet, Take 1 Tab by mouth every four hours as needed., Disp: 20 Tab, Rfl: 0  •  hydrocodone-acetaminophen (NORCO) 5-325 MG TABS per tablet, Take 1-2 Tabs by mouth every four hours as needed., Disp: 20 Tab, Rfl: 0  ALLERGIES:   Allergies   Allergen Reactions   • Sulfa Drugs Hives     Causes angioderma and hives     SURGHX:   Past Surgical History   Procedure Laterality Date   • Lumbar laminectomy diskectomy  3/18/2009     Performed by RIP GAMA at SURGERY [facility] ORS   • Tubal coagulation laparoscopic bilateral       SOCHX:  reports that she has been smoking Cigarettes.  She has a 15 pack-year smoking history. She has never used smokeless tobacco. She reports that she drinks alcohol. She reports that she does not use illicit drugs.  FH: Family history was reviewed, no pertinent findings to report        Objective:  "    /80 mmHg  Pulse 100  Temp(Src) 38.3 °C (101 °F)  Resp 16  Ht 1.6 m (5' 3\")  Wt 65.772 kg (145 lb)  BMI 25.69 kg/m2  SpO2 96%  LMP 09/20/2009      Physical Exam   Constitutional: She is oriented to person, place, and time. She appears well-developed and well-nourished. No distress.   HENT:   Head: Normocephalic and atraumatic.   Right Ear: External ear normal.   Left Ear: External ear normal.   Nose: Nose normal.   Eyes: Conjunctivae and lids are normal. Right eye exhibits no discharge. Left eye exhibits no discharge. No scleral icterus.   Neck: Neck supple.   Pulmonary/Chest: Effort normal. No respiratory distress.   Musculoskeletal:        Lumbar back: She exhibits decreased range of motion ( 2/2 pain), tenderness ( primary paraspinous), pain and spasm. She exhibits no bony tenderness, no swelling, no edema, no deformity, no laceration and normal pulse.        Back:    Healed surgical incision to lumbar, no edema/erythema/ecchymosis, limited AROM 2/2 pain, DTR's +2=, normal sensation and strength to UE/LE, 5/5     Neurological: She is alert and oriented to person, place, and time. She has normal strength and normal reflexes. She is not disoriented. No sensory deficit. She displays a negative Romberg sign. Coordination normal.   SLR normal bilat   Skin: Skin is warm and dry. She is not diaphoretic. No erythema. No pallor.   Psychiatric: Her speech is normal and behavior is normal.   Nursing note and vitals reviewed.    Dx lumbar  Impression        Postsurgical and degenerative changes described above.    Mild grade 1 anterolisthesis at L4-5.          INTERPRETING LOCATION:  [address] [place] [address], [address]         Reading Provider Reading Date     Jey Townsend M.D. Aug 2, 2017         Signing Provider Signing Date Signing Time     Jey Townsend M.D. Aug 2, 2017  1:27 PM       toradol - tolerates well    POCT UA -   POC Color gold Negative Final      POC Appearance clear Negative " Final     POC Leukocyte Esterase trace Negative Final     POC Nitrites neg Negative Final     POC Urobiligen neg Negative (0.2) mg/dL Final     POC Protein trace Negative mg/dL Final     POC Urine PH 6.0 5.0 - 8.0 Final     POC Blood neg Negative Final     POC Specific Gravity 1.015 <1.005 - >1.030 Final     POC Ketones neg Negative mg/dL Final     POC Biliruben neg Negative mg/dL Final     POC Glucose neg Negative mg/dL Final       Urine cx pending    Assessment/Plan:     1. Strain of lumbar paraspinal muscle, initial encounter  Recommend conservative care, rest, ice/heat, work on gentle ROM exercises, toradol, tramadol/flexeril, pt is interested in f/u w/ spine and re establishing w/ PCP for help w/ long term pain mangement and working on spine health  Return to clinic with lack of resolution or progression of symptoms.  ER precautions with any worsening symptoms are reviewed with patient/caregiver and they do express understanding   We discussed my concerns for her febrile state, we'll cover with antibiotics with leukocytes in urine to trend resolution any lack of resolution with pain medications would seek further imaging through the emergency department    - DX-LUMBAR SPINE-2 OR 3 VIEWS; Future  - ketorolac (TORADOL) injection 60 mg; 2 mL by Intramuscular route Once.  - POCT Urinalysis  - REFERRAL TO SPINE SURGERY  - cyclobenzaprine (FLEXERIL) 10 MG Tab; Take 1 Tab by mouth 3 times a day as needed.  Dispense: 30 Tab; Refill: 0  - tramadol (ULTRAM) 50 MG Tab; Take 1 Tab by mouth every four hours as needed.  Dispense: 30 Tab; Refill: 0  - REFERRAL TO FAMILY PRACTICE  - ciprofloxacin (CIPRO) 500 MG Tab; Take 1 Tab by mouth 2 times a day for 7 days.  Dispense: 14 Tab; Refill: 0  - URINE CULTURE(NEW); Future    2. Fever, unspecified fever cause    - POCT Urinalysis    3. Leukocytes in urine  Supportive care is reviewed with patient/caregiver - recommend to push PO fluids and electrolytes, nsaids/tylenol, rest, full  course of abx, probiotics w/ abx, azo/cran, observe for resolution  Return to clinic with lack of resolution or progression of symptoms.  Will call w/ bacteria on urine cx      - ciprofloxacin (CIPRO) 500 MG Tab; Take 1 Tab by mouth 2 times a day for 7 days.  Dispense: 14 Tab; Refill: 0  - URINE CULTURE(NEW); Future

## 2017-08-02 NOTE — Clinical Note
August 2, 2017       Patient: Kristina Ash   YOB: 1970   Date of Visit: 8/2/2017         To Whom It May Concern:    It is my medical opinion that Kristina Ash should be excused from work for tomorrow, Saturday and Sunday due to illness/injury.        If you have any questions or concerns, please don't hesitate to call [phone]          Sincerely,          Shemar Maki PA-C  Electronically Signed

## 2017-08-05 LAB
BACTERIA UR CULT: NORMAL
SIGNIFICANT IND 70042: NORMAL
SOURCE SOURCE: NORMAL

## 2017-08-23 ENCOUNTER — OFFICE VISIT (OUTPATIENT)
Dept: INTERNAL MEDICINE | Facility: MEDICAL CENTER | Age: 47
End: 2017-08-23
Payer: MEDICAID

## 2017-08-23 VITALS
BODY MASS INDEX: 25.44 KG/M2 | RESPIRATION RATE: 16 BRPM | DIASTOLIC BLOOD PRESSURE: 72 MMHG | OXYGEN SATURATION: 95 % | TEMPERATURE: 98 F | WEIGHT: 149 LBS | HEIGHT: 64 IN | SYSTOLIC BLOOD PRESSURE: 120 MMHG | HEART RATE: 97 BPM

## 2017-08-23 DIAGNOSIS — F17.200 TOBACCO USE DISORDER: ICD-10-CM

## 2017-08-23 DIAGNOSIS — G60.9 IDIOPATHIC PERIPHERAL NEUROPATHY: ICD-10-CM

## 2017-08-23 DIAGNOSIS — M54.41 CHRONIC RIGHT-SIDED LOW BACK PAIN WITH BILATERAL SCIATICA: ICD-10-CM

## 2017-08-23 DIAGNOSIS — G89.29 CHRONIC RIGHT-SIDED LOW BACK PAIN WITH BILATERAL SCIATICA: ICD-10-CM

## 2017-08-23 DIAGNOSIS — M54.42 CHRONIC RIGHT-SIDED LOW BACK PAIN WITH BILATERAL SCIATICA: ICD-10-CM

## 2017-08-23 DIAGNOSIS — Z00.00 HEALTH CARE MAINTENANCE: ICD-10-CM

## 2017-08-23 DIAGNOSIS — Z12.31 ENCOUNTER FOR SCREENING MAMMOGRAM FOR BREAST CANCER: ICD-10-CM

## 2017-08-23 DIAGNOSIS — Z76.89 ENCOUNTER TO ESTABLISH CARE WITH NEW DOCTOR: ICD-10-CM

## 2017-08-23 PROBLEM — G62.9 PERIPHERAL NEUROPATHY: Status: ACTIVE | Noted: 2017-08-23

## 2017-08-23 PROBLEM — M54.9 CHRONIC BACK PAIN: Status: ACTIVE | Noted: 2017-08-23

## 2017-08-23 PROCEDURE — 99204 OFFICE O/P NEW MOD 45 MIN: CPT | Mod: GC | Performed by: INTERNAL MEDICINE

## 2017-08-23 ASSESSMENT — PATIENT HEALTH QUESTIONNAIRE - PHQ9: CLINICAL INTERPRETATION OF PHQ2 SCORE: 0

## 2017-08-23 NOTE — PROGRESS NOTES
New Patient to Establish    Reason to establish: New patient to establish    CC: To establish with PCP     HPI: Ms Dias is a 48 yo female presents today to establish with PCP.   She has chronic back pain, about 12 years, she had laminectomy in 2009. Bilateral low back pain radiating to both legs. She was seen by spine surgeon and awaiting MRI of back.pain is controlled by flexeril 10 mg and tramadol 50 mg which she takes very  sparingly and avoid taking medication altogether on some days . She tried physical therapy 2009 after surgery, and would like to try again.  She also has peripheral neuropathy , pending EMG evaluation ordered by spine surgeon.   She states that her maternal grandmother had breast cancer, and would like to get a screening mammogram done. Last Pap smear was a few years ago.   She smokes half pack a day, started smoking about 30 years ago, she used to smoke about 1.5 packs a day, and she was able to cut down to half pack a day, and she is willing to quit.   She had a history of depression, she was on medication Seroquel, Wellbutrin about 15 years ago. Denies current depression symptoms, suicidal ideations.   She works as a , lives with a roommate. Takes a fairly healthy diet.  Menopause 5 years ago, experiencing frequent hot flashes.     Patient Active Problem List    Diagnosis Date Noted   • Chronic back pain 08/23/2017   • Dizziness and giddiness 09/11/2009   • Cough 09/11/2009   • Tobacco use disorder 09/11/2009   • Bipolar I disorder, most recent episode (or current) unspecified 09/11/2009   • Osteoarthritis of multiple joints 09/11/2009       Past Medical History   Diagnosis Date   • Dizziness and giddiness    • Cough    • Displacement of lumbar intervertebral disc without myelopathy      right sided sciatica   • Tobacco use disorder    • Bipolar I disorder, most recent episode (or current) unspecified      untreated     • Irregular menstrual cycle      menorrhagia lifelong   •  Pneumonia hx.   • Backache S/P L4-5     decompressive laminectomies with bilateral foramionotomies   • Pap smear  1990   • Screening mammogram  1998   • Need for TD vaccine  needed   • Lumbar pain  MRI   9/2008     MRI (9/2008) Left lateral disk protusion L5-S1 with left S1 root compression,small central disk  protusion at the L4-L5 with effacement of the ventral sac and small radial tear of the annulus as well as mild right lateral disk bulge at L4-L5 with neuro foraminal stenosis.   • Osteoarthrosis involving, or with mention of more than one site, but not specified as generalized, multiple sites        Current Outpatient Prescriptions   Medication Sig Dispense Refill   • tramadol (ULTRAM) 50 MG Tab Take 1 Tab by mouth every four hours as needed. 30 Tab 0   • cyclobenzaprine (FLEXERIL) 10 MG Tab Take 1 Tab by mouth 3 times a day as needed. 30 Tab 0     No current facility-administered medications for this visit.       Allergies as of 08/23/2017 - Cristian as Reviewed 08/23/2017   Allergen Reaction Noted   • Sulfa drugs Hives 06/15/2009       Social History     Social History   • Marital Status:      Spouse Name: N/A   • Number of Children: N/A   • Years of Education: N/A     Occupational History   • Not on file.     Social History Main Topics   • Smoking status: Current Every Day Smoker -- 0.50 packs/day for 30 years     Types: Cigarettes   • Smokeless tobacco: Never Used   • Alcohol Use: Yes      Comment: ocassional   • Drug Use: No      Comment: none currently but in the past did.   • Sexual Activity: Not on file      Comment:      Other Topics Concern   • Not on file     Social History Narrative       Family History   Problem Relation Age of Onset   • Diabetes Maternal Grandmother    • Cancer Maternal Grandmother    • Cancer Mother        Past Surgical History   Procedure Laterality Date   • Lumbar laminectomy diskectomy  3/18/2009     Performed by RIP GAMA at SURGERY [facility] ORS   • Tubal  "coagulation laparoscopic bilateral         ROS: As per HPI. Additional pertinent symptoms as noted below.  /72 mmHg  Pulse 97  Temp(Src) 36.7 °C (98 °F)  Resp 16  Ht 1.626 m (5' 4.02\")  Wt 67.586 kg (149 lb)  BMI 25.56 kg/m2  SpO2 95%  LMP 08/01/2013  Breastfeeding? No    Physical Exam  General:  Alert and oriented, No apparent distress.    Eyes: Pupils equal and reactive. No scleral icterus.    Throat: Clear no erythema or exudates noted.    Neck: Supple. No lymphadenopathy noted. Thyroid not enlarged.    Lungs: Clear to auscultation and percussion bilaterally.    Cardiovascular: Regular rate and rhythm. No murmurs, rubs or gallops.    Abdomen:  Benign. No rebound or guarding noted.    Extremities: No clubbing, cyanosis, edema.    Skin: Clear. No rash or suspicious skin lesions noted.      Note: I have reviewed all pertinent labs and diagnostic tests associated with this visit with specific comments listed under the assessment and plan below    Assessment and Plan    1. Tobacco use disorder  Patient is willing to quit. Discussed the importance of setting up a quit date, and working towards that.   Educated on avoiding triggers. She would like to try to quit on her own, and if it is not successful would like to try nicotine patches later.     -2. Chronic right-sided low back pain with bilateral sciatica  She is followed by the spine surgeon, pending MRI back.  She would like to continue Flexeril and tramadol which was prescribed at the urgent care. She has some more pills left with her.   Discussed that if we are to start prescribing tramadol, she will have to sign a pain contract, undergoing routine urine drug screening.  She would like to try physical therapy, and see what spine surgeon recommends before we prescribe pain medication.   - - VITAMIN D,25 HYDROXY; Future  - REFERRAL TO PHYSICAL THERAPY Reason for Therapy: Eval/Treat/Report    3. Encounter for screening mammogram for breast " cancer  MA-SCREEN MAMMO W/CAD-BILAT    4.  Health care maintenance  Will order routine lab work  - LIPID PROFILE; Future   CBC WITH DIFFERENTIAL; Future  - COMP METABOLIC PANEL; Future  - HEMOGLOBIN A1C; Future    Preventive care    TDaP -evaluate during next visit  Pneumococcal -N/a  Prevnar -N/a  Colonoscopy -N/a  Zostavax-N/a    Women only  Pap -patient will schedule an appointment for Pap smear  Mammogram -ordered today  Dexa -N/A  Discuss about hepatitis/HIV screening during next visit.    5. Idiopathic peripheral neuropathy  She has numbness when there is pressure on bilateral elbows.   Pending EMG.      HEMOGLOBIN A1C; Future  TSH WITH REFLEX TO FT4; Future  - VITAMIN B12; Future  - FOLATE; Future  Followup: Return in about 5 weeks (around 9/27/2017) for Long.        Complexity (discuss number of co-morbidities): 5    Signed by: Osito Elizabeth M.D.     Please note that this dictation was created using voice recognition software. I have made every reasonable attempt to correct obvious errors, but I expect that there are errors of grammar and possibly content that I did not discover before finalizing the note.

## 2017-08-23 NOTE — MR AVS SNAPSHOT
"        Kristina Dias Random   2017 9:00 AM   Office Visit   MRN: [id number]    Department:  [facility] Med - Internal Med   Dept Phone:  [phone]    Description:  Female : 1970   Provider:  Osito Elizabeth M.D.           Reason for Visit     Establish Care           Allergies as of 2017     Allergen Noted Reactions    Sulfa Drugs 06/15/2009   Hives    Causes angioderma and hives      You were diagnosed with     Tobacco use disorder   [305.1.ICD-9-CM]       Chronic right-sided low back pain with bilateral sciatica   [9169269]       Encounter for screening mammogram for breast cancer   [1076032]       Encounter to establish care with new doctor   [[id number]]       Health care maintenance   [[id number]]         Vital Signs     Blood Pressure Pulse Temperature Respirations Height Weight    120/72 mmHg 97 36.7 °C (98 °F) 16 1.626 m (5' 4.02\") 67.586 kg (149 lb)    Body Mass Index Oxygen Saturation Last Menstrual Period Breastfeeding? Smoking Status       25.56 kg/m2 95% 2013 No Current Every Day Smoker       Basic Information     Date Of Birth Sex Race Ethnicity Preferred Language    1970 Female White Non- English      Your appointments     Aug 30, 2017  9:00 AM   MR SP WO 30 with [facility]    [address] [place] [id number]   [phone]            Sep 27, 2017  1:15 PM   ANNUAL EXAM PREVENTATIVE with Osito Elizabeth M.D.   [facility] Med - Internal Medicine (--)    [address] [place] [id number] [phone]              Problem List              ICD-10-CM Priority Class Noted - Resolved    Dizziness and giddiness R42   2009 - Present    Cough R05   2009 - Present    Tobacco use disorder F17.200   2009 - Present    Bipolar I disorder, most recent episode (or current) unspecified F31.9   2009 - Present    Osteoarthritis of multiple joints M15.9   2009 - Present    Chronic back pain M54.9, " G89.29   8/23/2017 - Present      Health Maintenance        Date Due Completion Dates    IMM DTaP/Tdap/Td Vaccine (1 - Tdap) 5/14/1987 ---    IMM PNEUMOCOCCAL 19-64 (ADULT) MEDIUM RISK SERIES (1 of 1 - PPSV23) 5/14/1987 ---    PAP SMEAR 5/14/1989 ---    MAMMOGRAM 5/14/2008 ---    IMM INFLUENZA (1) 9/1/2017 ---            Current Immunizations     No immunizations on file.      Below and/or attached are the medications your provider expects you to take. Review all of your home medications and newly ordered medications with your provider and/or pharmacist. Follow medication instructions as directed by your provider and/or pharmacist. Please keep your medication list with you and share with your provider. Update the information when medications are discontinued, doses are changed, or new medications (including over-the-counter products) are added; and carry medication information at all times in the event of emergency situations     Allergies:  SULFA DRUGS - Hives               Medications  Valid as of: August 23, 2017 -  9:38 AM    Generic Name Brand Name Tablet Size Instructions for use    Cyclobenzaprine HCl (Tab) FLEXERIL 10 MG Take 1 Tab by mouth 3 times a day as needed.        TraMADol HCl (Tab) ULTRAM 50 MG Take 1 Tab by mouth every four hours as needed.        .                 Medicines prescribed today were sent to:     [id number]  [place] - [address] [place] [id number]    Phone: [phone] Fax: [phone]    Open 24 Hours?: No      Medication refill instructions:       If your prescription bottle indicates you have medication refills left, it is not necessary to call your provider’s office. Please contact your pharmacy and they will refill your medication.    If your prescription bottle indicates you do not have any refills left, you may request refills at any time through one of the following ways: The online [employer] system (except Urgent Care), by  calling your provider’s office, or by asking your pharmacy to contact your provider’s office with a refill request. Medication refills are processed only during regular business hours and may not be available until the next business day. Your provider may request additional information or to have a follow-up visit with you prior to refilling your medication.   *Please Note: Medication refills are assigned a new Rx number when refilled electronically. Your pharmacy may indicate that no refills were authorized even though a new prescription for the same medication is available at the pharmacy. Please request the medicine by name with the pharmacy before contacting your provider for a refill.        Your To Do List     Future Labs/Procedures Complete By Expires    CBC WITH DIFFERENTIAL  As directed 8/23/2018    COMP METABOLIC PANEL  As directed 8/23/2018    HEMOGLOBIN A1C  As directed 8/23/2018    LIPID PROFILE  As directed 8/23/2018    TSH WITH REFLEX TO FT4  As directed 8/23/2018    VITAMIN D,25 HYDROXY  As directed 8/23/2018      Referral     A referral request has been sent to our patient care coordination department. Please allow 3-5 business days for us to process this request and contact you either by phone or mail. If you do not hear from us by the 5th business day, please call us at [phone].        Instructions    A referral to physical therapy placed  Please get the lab work done before the next appointment            [employer] Access Code: Activation code not generated  Current [employer] Status: Active          Quit Tobacco Information     Do you want to quit using tobacco?    Quitting tobacco decreases risks of cancer, heart and lung disease, increases life expectancy, improves sense of taste and smell, and increases spending money, among other benefits.    If you are thinking about quitting, we can help.  • [facility] Quit Tobacco Program: [phone]  o Program occurs weekly for four weeks and includes  pharmacist consultation on products to support quitting smoking or chewing tobacco. A provider referral is needed for pharmacist consultation.  • Tobacco Users Help Hotline: 9-800QUIT-NOW (307-3649) or https://[place].quitlogix.org/  o Free, confidential telephone and online coaching for [place] residents. Sessions are designed on a schedule that is convenient for you. Eligible clients receive free nicotine replacement therapy.  • Nationally: www.smokefree.gov  o Information and professional assistance to support both immediate and long-term needs as you become, and remain, a non-smoker. Smokefree.gov allows you to choose the help that best fits your needs.

## 2017-08-26 ENCOUNTER — APPOINTMENT (OUTPATIENT)
Dept: LAB | Facility: MEDICAL CENTER | Age: 47
End: 2017-08-26
Attending: INTERNAL MEDICINE
Payer: MEDICAID

## 2017-08-30 ENCOUNTER — HOSPITAL ENCOUNTER (OUTPATIENT)
Dept: RADIOLOGY | Facility: MEDICAL CENTER | Age: 47
End: 2017-08-30
Attending: ADVANCED PRACTICE MIDWIFE
Payer: MEDICAID

## 2017-08-30 DIAGNOSIS — M54.41 LUMBAGO WITH SCIATICA, RIGHT SIDE: ICD-10-CM

## 2017-08-30 PROCEDURE — 72148 MRI LUMBAR SPINE W/O DYE: CPT

## 2017-08-31 ENCOUNTER — APPOINTMENT (OUTPATIENT)
Dept: RADIOLOGY | Facility: IMAGING CENTER | Age: 47
End: 2017-08-31
Payer: MEDICAID

## 2017-08-31 ENCOUNTER — NON-PROVIDER VISIT (OUTPATIENT)
Dept: URGENT CARE | Facility: PHYSICIAN GROUP | Age: 47
End: 2017-08-31

## 2017-08-31 DIAGNOSIS — M51.36 DEGENERATION, INTERVERTEBRAL DISC, LUMBAR: ICD-10-CM

## 2017-08-31 DIAGNOSIS — M51.36 DEGENERATION OF LUMBAR INTERVERTEBRAL DISC: ICD-10-CM

## 2017-08-31 PROCEDURE — 72100 X-RAY EXAM L-S SPINE 2/3 VWS: CPT | Mod: TC | Performed by: PHYSICIAN ASSISTANT

## 2017-09-05 DIAGNOSIS — Z76.89 ENCOUNTER TO ESTABLISH CARE WITH NEW DOCTOR: ICD-10-CM

## 2017-09-05 DIAGNOSIS — F17.200 TOBACCO USE DISORDER: ICD-10-CM

## 2017-09-05 DIAGNOSIS — G89.29 CHRONIC RIGHT-SIDED LOW BACK PAIN WITH BILATERAL SCIATICA: ICD-10-CM

## 2017-09-05 DIAGNOSIS — Z00.00 HEALTH CARE MAINTENANCE: ICD-10-CM

## 2017-09-05 DIAGNOSIS — M54.41 CHRONIC RIGHT-SIDED LOW BACK PAIN WITH BILATERAL SCIATICA: ICD-10-CM

## 2017-09-05 DIAGNOSIS — M54.42 CHRONIC RIGHT-SIDED LOW BACK PAIN WITH BILATERAL SCIATICA: ICD-10-CM

## 2017-09-27 ENCOUNTER — OFFICE VISIT (OUTPATIENT)
Dept: INTERNAL MEDICINE | Facility: MEDICAL CENTER | Age: 47
End: 2017-09-27
Payer: MEDICAID

## 2017-09-27 VITALS
HEART RATE: 83 BPM | DIASTOLIC BLOOD PRESSURE: 82 MMHG | HEIGHT: 64 IN | BODY MASS INDEX: 25.85 KG/M2 | TEMPERATURE: 98.8 F | SYSTOLIC BLOOD PRESSURE: 130 MMHG | WEIGHT: 151.4 LBS | OXYGEN SATURATION: 97 %

## 2017-09-27 DIAGNOSIS — M54.42 CHRONIC RIGHT-SIDED LOW BACK PAIN WITH BILATERAL SCIATICA: ICD-10-CM

## 2017-09-27 DIAGNOSIS — G89.29 CHRONIC RIGHT-SIDED LOW BACK PAIN WITH BILATERAL SCIATICA: ICD-10-CM

## 2017-09-27 DIAGNOSIS — N95.1 HOT FLASHES DUE TO MENOPAUSE: ICD-10-CM

## 2017-09-27 DIAGNOSIS — M54.41 CHRONIC RIGHT-SIDED LOW BACK PAIN WITH BILATERAL SCIATICA: ICD-10-CM

## 2017-09-27 DIAGNOSIS — Z23 ENCOUNTER FOR IMMUNIZATION: ICD-10-CM

## 2017-09-27 DIAGNOSIS — Z12.4 ENCOUNTER FOR PAPANICOLAOU SMEAR FOR CERVICAL CANCER SCREENING: ICD-10-CM

## 2017-09-27 PROCEDURE — 90686 IIV4 VACC NO PRSV 0.5 ML IM: CPT | Performed by: INTERNAL MEDICINE

## 2017-09-27 PROCEDURE — 99000 SPECIMEN HANDLING OFFICE-LAB: CPT | Performed by: INTERNAL MEDICINE

## 2017-09-27 PROCEDURE — 99214 OFFICE O/P EST MOD 30 MIN: CPT | Mod: 25,GC | Performed by: INTERNAL MEDICINE

## 2017-09-27 PROCEDURE — 90471 IMMUNIZATION ADMIN: CPT | Performed by: INTERNAL MEDICINE

## 2017-09-27 RX ORDER — TRAMADOL HYDROCHLORIDE 50 MG/1
50 TABLET ORAL EVERY 12 HOURS PRN
Qty: 30 TAB | Refills: 0 | Status: SHIPPED | OUTPATIENT
Start: 2017-09-27 | End: 2019-06-18

## 2017-09-27 RX ORDER — ESTRADIOL 0.04 MG/D
1 PATCH TRANSDERMAL
Qty: 4 PATCH | Refills: 0 | Status: CANCELLED | OUTPATIENT
Start: 2017-09-27

## 2017-09-27 NOTE — PROGRESS NOTES
"      Established Patient    Kristina presents today with the following:    CC: pap smear, hot flushes     HPI: patient presents today for pap smear. She also complains of hot flushes that occurs frequently since menopause 6 years ago. She continues to smoke, trying to cut down. She also was seen by a spine doctor for her back pain, prescribed gabapentin. She is requesting a refill of tramadol.     Patient Active Problem List    Diagnosis Date Noted   • Encounter for Papanicolaou smear for cervical cancer screening 09/27/2017   • Encounter for immunization 09/27/2017   • Chronic back pain 08/23/2017   • Peripheral neuropathy 08/23/2017   • Dizziness and giddiness 09/11/2009   • Cough 09/11/2009   • Tobacco use disorder 09/11/2009   • Bipolar I disorder, most recent episode (or current) unspecified 09/11/2009   • Osteoarthritis of multiple joints 09/11/2009       Current Outpatient Prescriptions   Medication Sig Dispense Refill   • tramadol (ULTRAM) 50 MG Tab Take 1 Tab by mouth every 12 hours as needed. 30 Tab 0   • varenicline (CHANTIX STARTING MONTH PAK) 0.5 MG X 11 & 1 MG X 42 tablet Days 1 to 3: 0.5 mg once daily then Days 4 to 7: 0.5 mg twice dailyThen  1 mg twice daily for 11 weeks 56 Tab 3   • cyclobenzaprine (FLEXERIL) 10 MG Tab Take 1 Tab by mouth 3 times a day as needed. 30 Tab 0     No current facility-administered medications for this visit.        ROS: As per HPI. Additional pertinent symptoms as noted below.        /82   Pulse 83   Temp 37.1 °C (98.8 °F)   Ht 1.619 m (5' 3.75\")   Wt 68.7 kg (151 lb 6.4 oz)   LMP 08/01/2013   SpO2 97%   BMI 26.19 kg/m²     Physical Exam   Constitutional:  oriented to person, place, and time. No distress.   Eyes: Pupils are equal, round, and reactive to light. No scleral icterus.  Neck: Neck supple. No thyromegaly present.   Cardiovascular: Normal rate, regular rhythm and normal heart sounds.  Exam reveals no gallop and no friction rub.  No murmur " heard.  Pulmonary/Chest: Breath sounds normal. Chest wall is not dull to percussion.   Musculoskeletal:   no edema.   Lymphadenopathy: no cervical adenopathy  Neurological: alert and oriented to person, place, and time.   Skin: No cyanosis. Nails show no clubbing.  Vaginal exam: No discharge, cervix visualized, Pap smear done. Minor bleeding noted.   Pelvic exam no palpable masses.     Note: I have reviewed all pertinent labs and diagnostic tests associated with this visit with specific comments listed under the assessment and plan below    Assessment and Plan    1. Encounter for Papanicolaou smear for cervical cancer screening  Pap smear done today  - THINPREP PAP ONLY; Future    2. Encounter for immunization    - INFLUENZA VACCINE QUAD INJ >3Y(PF)    3. Hot flashes due to menopause  Patient requesting hormone replacement patches. However she continues to smoke which increases the risk of thromboembolism with a suture replacement.    Recommended that she quit smoking and then we can consider hormonal replacements patches.  Patient understands and agrees.   Patient would like to try Chantix to help with smoking cessation.      4. Chronic right-sided low back pain with bilateral sciatica  We will refill tramadol today. Follow-up in 5 weeks, will have to sign a pain contract and urine drug screen during next visit.     Followup: Return in about 5 weeks (around 11/1/2017).      Signed by: Osito Elizabeth M.D.

## 2017-09-28 NOTE — PROGRESS NOTES
"Kristina sAh is a 47 y.o. female here for a non-provider visit for:   FLU    Reason for immunization: Annual Flu Vaccine  Immunization records indicate need for vaccine: Yes, confirmed with Epic  Minimum interval has been met for this vaccine: Yes  ABN completed: Not Indicated    Order and dose verified by: Angelica Grace [facility]   VIS Dated  08/07/15 was given to patient: Yes  All IAC Questionnaire questions were answered \"No.\"    Patient tolerated injection and no adverse effects were observed or reported: Yes    Pt scheduled for next dose in series: Not Indicated    "

## 2017-10-04 DIAGNOSIS — Z12.4 ENCOUNTER FOR PAPANICOLAOU SMEAR FOR CERVICAL CANCER SCREENING: ICD-10-CM

## 2017-10-19 ENCOUNTER — TELEPHONE (OUTPATIENT)
Dept: INTERNAL MEDICINE | Facility: MEDICAL CENTER | Age: 47
End: 2017-10-19

## 2017-10-19 DIAGNOSIS — F17.200 TOBACCO DEPENDENCE: ICD-10-CM

## 2017-10-20 NOTE — TELEPHONE ENCOUNTER
Pt returned call notified chantix was denied by insurance due to pt needing to complete a smoking cessatin class asked her to call her insurance and they should be be able to give information on class

## 2017-10-20 NOTE — TELEPHONE ENCOUNTER
Insurance denied chantix, referral placed to smoking cessation class per the insurance requirement.

## 2017-10-25 NOTE — TELEPHONE ENCOUNTER
Last seen: 09/27/17 by Dr. Elizabeth  Next appt: 11/01/17 with Dr. Elizabeth    Was the patient seen in the last year in this department? Yes   Does patient have an active prescription for medications requested? No   Received Request Via: Pharmacy

## 2017-10-26 RX ORDER — CYCLOBENZAPRINE HCL 10 MG
10 TABLET ORAL 3 TIMES DAILY PRN
Qty: 30 TAB | Refills: 2 | Status: SHIPPED | OUTPATIENT
Start: 2017-10-26 | End: 2018-05-09

## 2017-12-20 LAB — EKG IMPRESSION: NORMAL

## 2017-12-20 PROCEDURE — 302449 STATCHG TRIAGE ONLY (STATISTIC)

## 2017-12-20 PROCEDURE — 93005 ELECTROCARDIOGRAM TRACING: CPT

## 2017-12-21 ENCOUNTER — HOSPITAL ENCOUNTER (EMERGENCY)
Facility: MEDICAL CENTER | Age: 47
End: 2017-12-21

## 2017-12-21 VITALS
OXYGEN SATURATION: 96 % | HEART RATE: 72 BPM | WEIGHT: 153.88 LBS | TEMPERATURE: 98.5 F | SYSTOLIC BLOOD PRESSURE: 134 MMHG | BODY MASS INDEX: 27.27 KG/M2 | DIASTOLIC BLOOD PRESSURE: 70 MMHG | RESPIRATION RATE: 14 BRPM | HEIGHT: 63 IN

## 2017-12-21 NOTE — ED NOTES
Pt called from lobby and senior lounge to go back to room with no response. Will call again in 10 minutes.

## 2017-12-21 NOTE — ED NOTES
"Chief Complaint   Patient presents with   • Dizziness     started 4 days ago. worse when looking up or bending down    • Tingling     left leg. started 3 days ago. \"feels like needles, not numb likes its asleep\"       "

## 2018-01-12 ENCOUNTER — HOSPITAL ENCOUNTER (EMERGENCY)
Facility: MEDICAL CENTER | Age: 48
End: 2018-01-12
Attending: EMERGENCY MEDICINE

## 2018-01-12 VITALS
BODY MASS INDEX: 26.6 KG/M2 | OXYGEN SATURATION: 97 % | SYSTOLIC BLOOD PRESSURE: 138 MMHG | HEIGHT: 63 IN | RESPIRATION RATE: 18 BRPM | DIASTOLIC BLOOD PRESSURE: 70 MMHG | HEART RATE: 88 BPM | WEIGHT: 150.13 LBS | TEMPERATURE: 97.2 F

## 2018-01-12 DIAGNOSIS — R59.9 ADENOPATHY: ICD-10-CM

## 2018-01-12 DIAGNOSIS — J02.9 PHARYNGITIS, UNSPECIFIED ETIOLOGY: ICD-10-CM

## 2018-01-12 PROCEDURE — 99283 EMERGENCY DEPT VISIT LOW MDM: CPT

## 2018-01-12 RX ORDER — MELOXICAM 7.5 MG/1
7.5 TABLET ORAL DAILY
Qty: 30 TAB | Refills: 0 | Status: SHIPPED | OUTPATIENT
Start: 2018-01-12 | End: 2018-05-09

## 2018-01-12 RX ORDER — AZITHROMYCIN 250 MG/1
TABLET, FILM COATED ORAL
Qty: 6 TAB | Refills: 0 | Status: SHIPPED | OUTPATIENT
Start: 2018-01-12 | End: 2018-05-09

## 2018-01-12 ASSESSMENT — LIFESTYLE VARIABLES: DO YOU DRINK ALCOHOL: NO

## 2018-01-12 NOTE — ED PROVIDER NOTES
ED Provider Note    CHIEF COMPLAINT  Chief Complaint   Patient presents with   • Neck Swelling     R side Lymph node   • Ear Pain   • Sore Throat       HPI  Kristina Davis is a 47 y.o. female who presents for evaluation of sore throat, ear pain, and neck swelling. The patient states she's been sick for about a week. She thinks she's had some intermittent fevers. She denies any dental pain. She's had a cough it's been slightly productive.    REVIEW OF SYSTEMS  See HPI for further details. All other systems are negative.     PAST MEDICAL HISTORY  Past Medical History:   Diagnosis Date   • Backache S/P L4-5    decompressive laminectomies with bilateral foramionotomies   • Bipolar I disorder, most recent episode (or current) unspecified     untreated     • Cough    • Displacement of lumbar intervertebral disc without myelopathy     right sided sciatica   • Dizziness and giddiness    • Irregular menstrual cycle     menorrhagia lifelong   • Lumbar pain  MRI   9/2008    MRI (9/2008) Left lateral disk protusion L5-S1 with left S1 root compression,small central disk  protusion at the L4-L5 with effacement of the ventral sac and small radial tear of the annulus as well as mild right lateral disk bulge at L4-L5 with neuro foraminal stenosis.   • Need for TD vaccine  needed   • Osteoarthrosis involving, or with mention of more than one site, but not specified as generalized, multiple sites    • Pap smear  1990   • Pneumonia hx.   • Screening mammogram  1998   • Tobacco use disorder        FAMILY HISTORY  Family History   Problem Relation Age of Onset   • Cancer Mother    • Diabetes Maternal Grandmother    • Cancer Maternal Grandmother        SOCIAL HISTORY  Social History     Social History   • Marital status:      Spouse name: N/A   • Number of children: N/A   • Years of education: N/A     Social History Main Topics   • Smoking status: Current Every Day Smoker     Packs/day: 0.50     Years: 30.00     Types:  "Cigarettes   • Smokeless tobacco: Never Used   • Alcohol use Yes      Comment: ocassional   • Drug use: No      Comment: none currently but in the past did.   • Sexual activity: Not Currently      Comment:      Other Topics Concern   • Not on file     Social History Narrative   • No narrative on file       SURGICAL HISTORY  Past Surgical History:   Procedure Laterality Date   • LUMBAR LAMINECTOMY DISKECTOMY  3/18/2009    Performed by RIP GAMA at SURGERY [facility] ORS   • TUBAL COAGULATION LAPAROSCOPIC BILATERAL         CURRENT MEDICATIONS  Home Medications    **Home medications have not yet been reviewed for this encounter**         ALLERGIES  Allergies   Allergen Reactions   • Sulfa Drugs Hives     Causes angioderma and hives       PHYSICAL EXAM  VITAL SIGNS: /68   Pulse 96   Temp 35.9 °C (96.7 °F)   Resp 16   Ht 1.6 m (5' 3\")   Wt 68.1 kg (150 lb 2.1 oz)   LMP 08/01/2013   SpO2 97%   BMI 26.59 kg/m²     Constitutional: Well developed, Well nourished, No acute distress, Non-toxic appearance.   HENT: Normocephalic, Atraumatic. TMs are clear bilaterally. Oropharynx shows diffuse erythema with no edema or exudates. Floor of the mouth is soft. No trismus. No facial swelling.  Eyes:  EOMI, Conjunctiva normal, No discharge.   Neck: Normal range of motion. Right anterior cervical lymphadenopathy. No erythema. No fluctuance.  Cardiovascular: Normal heart rate.   Thorax & Lungs: No respiratory distress.  Skin: Warm, Dry.   Musculoskeletal: Good range of motion in all major joints.  Neurologic: Awake alert.    COURSE & MEDICAL DECISION MAKING  Pertinent Labs & Imaging studies reviewed. (See chart for details)  This is a 47-year-old here for evaluation of sore throat, ear pain, and neck swelling. On exam she appears to have some right anterior cervical lymphadenopathy. There is no fluctuance or erythema. I think this is reactive in nature. She does appear to have a pharyngitis. She is not " hypoxemic and she is afebrile here. At this time I will start her on azithromycin and provide her prescription for meloxicam. We have discussed the possibility of this being a viral etiology. She will follow-up with her doctor if possible for I have referred her to Dr. Del Castillo who is on-call for family medicine today. She is given a discharge instruction sheet on pharyngitis and on adenopathy.    FINAL IMPRESSION  1. Pharyngitis  2. Right anterior cervical lymphadenopathy  3.         Electronically signed by: Amrit Peterson, 1/12/2018 11:08 AM

## 2018-01-12 NOTE — ED NOTES
Pt arrived in room   Pt resting comfortably on gurMarion.   Call light within reach and visible from nurses station.

## 2018-01-12 NOTE — ED NOTES
Kristina Dias Random 47 y.o. female   Chief Complaint   Patient presents with   • Neck Swelling     R side Lymph node   • Ear Pain   • Sore Throat      Pt returned to [facility] and educated on triage process.  Advised to notify RN with changes or concerns.

## 2018-01-12 NOTE — DISCHARGE INSTRUCTIONS
Lymphadenopathy  Lymphadenopathy refers to swollen or enlarged lymph glands, also called lymph nodes. Lymph glands are part of your body's defense (immune) system, which protects the body from infections, germs, and diseases. Lymph glands are found in many locations in your body, including the neck, underarm, and groin.   Many things can cause lymph glands to become enlarged. When your immune system responds to germs, such as viruses or bacteria, infection-fighting cells and fluid build up. This causes the glands to grow in size. Usually, this is not something to worry about. The swelling and any soreness often go away without treatment. However, swollen lymph glands can also be caused by a number of diseases. Your health care provider may do various tests to help determine the cause. If the cause of your swollen lymph glands cannot be found, it is important to monitor your condition to make sure the swelling goes away.  HOME CARE INSTRUCTIONS  Watch your condition for any changes. The following actions may help to lessen any discomfort you are feeling:  · Get plenty of rest.  · Take medicines only as directed by your health care provider. Your health care provider may recommend over-the-counter medicines for pain.  · Apply moist heat compresses to the site of swollen lymph nodes as directed by your health care provider. This can help reduce any pain.  · Check your lymph nodes daily for any changes.  · Keep all follow-up visits as directed by your health care provider. This is important.  SEEK MEDICAL CARE IF:  · Your lymph nodes are still swollen after 2 weeks.  · Your swelling increases or spreads to other areas.  · Your lymph nodes are hard, seem fixed to the skin, or are growing rapidly.  · Your skin over the lymph nodes is red and inflamed.  · You have a fever.  · You have chills.  · You have fatigue.  · You develop a sore throat.  · You have abdominal pain.  · You have weight loss.  · You have night  sweats.  SEEK IMMEDIATE MEDICAL CARE IF:  · You notice fluid leaking from the area of the enlarged lymph node.  · You have severe pain in any area of your body.  · You have chest pain.  · You have shortness of breath.     This information is not intended to replace advice given to you by your health care provider. Make sure you discuss any questions you have with your health care provider.     Document Released: 09/26/2009 Document Revised: 01/08/2016 Document Reviewed: 07/23/2015  [employer] Interactive Patient Education ©2016 [employer]    Pharyngitis  Pharyngitis is redness, pain, and swelling (inflammation) of your pharynx.   CAUSES   Pharyngitis is usually caused by infection. Most of the time, these infections are from viruses (viral) and are part of a cold. However, sometimes pharyngitis is caused by bacteria (bacterial). Pharyngitis can also be caused by allergies. Viral pharyngitis may be spread from person to person by coughing, sneezing, and personal items or utensils (cups, forks, spoons, toothbrushes). Bacterial pharyngitis may be spread from person to person by more intimate contact, such as kissing.   SIGNS AND SYMPTOMS   Symptoms of pharyngitis include:    · Sore throat.    · Tiredness (fatigue).    · Low-grade fever.    · Headache.  · Joint pain and muscle aches.  · Skin rashes.  · Swollen lymph nodes.  · Plaque-like film on throat or tonsils (often seen with bacterial pharyngitis).  DIAGNOSIS   Your health care provider will ask you questions about your illness and your symptoms. Your medical history, along with a physical exam, is often all that is needed to diagnose pharyngitis. Sometimes, a rapid strep test is done. Other lab tests may also be done, depending on the suspected cause.   TREATMENT   Viral pharyngitis will usually get better in 3-4 days without the use of medicine. Bacterial pharyngitis is treated with medicines that kill germs (antibiotics).   HOME CARE INSTRUCTIONS   · Drink  enough water and fluids to keep your urine clear or pale yellow.    · Only take over-the-counter or prescription medicines as directed by your health care provider:    ¨ If you are prescribed antibiotics, make sure you finish them even if you start to feel better.    ¨ Do not take aspirin.    · Get lots of rest.    · Gargle with 8 oz of salt water (½ tsp of salt per 1 qt of water) as often as every 1-2 hours to soothe your throat.    · Throat lozenges (if you are not at risk for choking) or sprays may be used to soothe your throat.  SEEK MEDICAL CARE IF:   · You have large, tender lumps in your neck.  · You have a rash.  · You cough up green, yellow-brown, or bloody spit.  SEEK IMMEDIATE MEDICAL CARE IF:   · Your neck becomes stiff.  · You drool or are unable to swallow liquids.  · You vomit or are unable to keep medicines or liquids down.  · You have severe pain that does not go away with the use of recommended medicines.  · You have trouble breathing (not caused by a stuffy nose).  MAKE SURE YOU:   · Understand these instructions.  · Will watch your condition.  · Will get help right away if you are not doing well or get worse.     This information is not intended to replace advice given to you by your health care provider. Make sure you discuss any questions you have with your health care provider.     Document Released: 12/18/2006 Document Revised: 10/08/2014 Document Reviewed: 08/25/2014  [employer] Interactive Patient Education ©2016 [employer]

## 2018-05-09 ENCOUNTER — HOSPITAL ENCOUNTER (EMERGENCY)
Facility: MEDICAL CENTER | Age: 48
End: 2018-05-09
Attending: EMERGENCY MEDICINE
Payer: COMMERCIAL

## 2018-05-09 VITALS
TEMPERATURE: 97.4 F | RESPIRATION RATE: 18 BRPM | HEIGHT: 63 IN | SYSTOLIC BLOOD PRESSURE: 120 MMHG | WEIGHT: 144.4 LBS | BODY MASS INDEX: 25.59 KG/M2 | DIASTOLIC BLOOD PRESSURE: 71 MMHG | OXYGEN SATURATION: 97 % | HEART RATE: 85 BPM

## 2018-05-09 DIAGNOSIS — M54.50 ACUTE RIGHT-SIDED LOW BACK PAIN WITHOUT SCIATICA: ICD-10-CM

## 2018-05-09 LAB
APPEARANCE UR: CLEAR
BILIRUB UR QL STRIP.AUTO: NEGATIVE
COLOR UR: YELLOW
GLUCOSE UR STRIP.AUTO-MCNC: NEGATIVE MG/DL
KETONES UR STRIP.AUTO-MCNC: NEGATIVE MG/DL
LEUKOCYTE ESTERASE UR QL STRIP.AUTO: NEGATIVE
MICRO URNS: NORMAL
NITRITE UR QL STRIP.AUTO: NEGATIVE
PH UR STRIP.AUTO: 6 [PH]
PROT UR QL STRIP: NEGATIVE MG/DL
RBC UR QL AUTO: NEGATIVE
SP GR UR STRIP.AUTO: 1.02

## 2018-05-09 PROCEDURE — 81003 URINALYSIS AUTO W/O SCOPE: CPT

## 2018-05-09 PROCEDURE — 99284 EMERGENCY DEPT VISIT MOD MDM: CPT

## 2018-05-09 PROCEDURE — 96372 THER/PROPH/DIAG INJ SC/IM: CPT

## 2018-05-09 PROCEDURE — 700111 HCHG RX REV CODE 636 W/ 250 OVERRIDE (IP): Performed by: EMERGENCY MEDICINE

## 2018-05-09 RX ORDER — KETOROLAC TROMETHAMINE 30 MG/ML
30 INJECTION, SOLUTION INTRAMUSCULAR; INTRAVENOUS ONCE
Status: COMPLETED | OUTPATIENT
Start: 2018-05-09 | End: 2018-05-09

## 2018-05-09 RX ORDER — PREDNISONE 20 MG/1
20 TABLET ORAL ONCE
Status: COMPLETED | OUTPATIENT
Start: 2018-05-09 | End: 2018-05-09

## 2018-05-09 RX ORDER — METHYLPREDNISOLONE 4 MG/1
TABLET ORAL
Qty: 1 KIT | Refills: 0 | Status: SHIPPED | OUTPATIENT
Start: 2018-05-09 | End: 2019-01-29

## 2018-05-09 RX ADMIN — PREDNISONE 20 MG: 20 TABLET ORAL at 16:11

## 2018-05-09 RX ADMIN — KETOROLAC TROMETHAMINE 30 MG: 30 INJECTION, SOLUTION INTRAMUSCULAR; INTRAVENOUS at 16:11

## 2018-05-09 ASSESSMENT — PAIN SCALES - GENERAL
PAINLEVEL_OUTOF10: 6
PAINLEVEL_OUTOF10: 5

## 2018-05-09 NOTE — ED NOTES
"Assessment complete. Pt c/o lower back pain worse over last 3 days. Hx of bulging discs but denies new injury or trauma. Denies urinary s/sx. Denies numbness or tingling to extremities. c/o dizziness \"that just hits me all of a sudden and then passes\" skinpwd. [id number]. resp nonlabored.   "

## 2018-05-09 NOTE — ED PROVIDER NOTES
ED Provider Note    CHIEF COMPLAINT  Chief Complaint   Patient presents with   • Flank Pain     woke with right flank pain 2 days ago, she is not sure if this has something to do with her back problems or kidneys. Also co of feeling dizzy       HPI  Kristina Davis is a 47 y.o. female who presents with report of right lower back pain that she describes as well localized and nonradiating, moderate in severity and does not have any fever, incontinence, dysuria or frequency. She felt a little dizzy. He does take medications for her back including Neurontin, Flexeril, Ultram. Has not been on steroids recently. Denies any intravenous drug use or recent trauma and denies any other complaints    ROS  Pertinent negative for fever.    PAST MEDICAL HISTORY  Past Medical History:   Diagnosis Date   • Backache S/P L4-5    decompressive laminectomies with bilateral foramionotomies   • Bipolar I disorder, most recent episode (or current) unspecified     untreated     • Cough    • Displacement of lumbar intervertebral disc without myelopathy     right sided sciatica   • Dizziness and giddiness    • Irregular menstrual cycle     menorrhagia lifelong   • Lumbar pain  MRI   9/2008    MRI (9/2008) Left lateral disk protusion L5-S1 with left S1 root compression,small central disk  protusion at the L4-L5 with effacement of the ventral sac and small radial tear of the annulus as well as mild right lateral disk bulge at L4-L5 with neuro foraminal stenosis.   • Need for TD vaccine  needed   • Osteoarthrosis involving, or with mention of more than one site, but not specified as generalized, multiple sites    • Pap smear  1990   • Pneumonia hx.   • Screening mammogram  1998   • Tobacco use disorder        FAMILY HISTORY  Family History   Problem Relation Age of Onset   • Cancer Mother    • Diabetes Maternal Grandmother    • Cancer Maternal Grandmother        SOCIAL HISTORY   reports that she has been smoking Cigarettes.  She has a  "15.00 pack-year smoking history. She has never used smokeless tobacco. She reports that she drinks alcohol. She reports that she does not use drugs.    SURGICAL HISTORY  Past Surgical History:   Procedure Laterality Date   • LUMBAR LAMINECTOMY DISKECTOMY  3/18/2009    Performed by [facility] at [facility]   • TUBAL COAGULATION LAPAROSCOPIC BILATERAL         CURRENT MEDICATIONS  Home Medications    **Home medications have not yet been reviewed for this encounter**         ALLERGIES  Allergies   Allergen Reactions   • Sulfa Drugs Hives     Causes angioderma and hives       PHYSICAL EXAM  VITAL SIGNS: /78   Pulse 97   Temp 36.3 °C (97.4 °F)   Resp 18   Ht 1.6 m (5' 3\")   Wt 65.5 kg (144 lb 6.4 oz)   LMP 08/01/2013   SpO2 92%   BMI 25.58 kg/m²    Constitutional: Well developed, Well nourished, No acute distress, Non-toxic appearance.   Skin: Normal  Back: Tenderness in the right lower paralumbar spine extending over to the hip but not to the sciatic notch  Extremities:   Musculoskeletal:   Neurologic: Neurologically intact, 2+ reflexes bilateral Achilles and patellar tendons. Bilateral straight leg raise negative  Psychiatric:       COURSE & MEDICAL DECISION MAKING  Pertinent Labs & Imaging studies reviewed. (See chart for details)  Patient has signs of lower back pain and strain. I gave the patient prednisone as well as intramuscular Toradol and discharge her on Medrol Dosepak with instructions to increase her Neurontin to 3 times a day. She is going to do some gentle stretching and follow-up with her doctor return if any significant change in symptoms. There is no evidence she has cauda equina syndrome or spinal epidural abscess clinically    FINAL IMPRESSION  1. Acute right-sided low back pain without sciatica            Electronically signed by: Geovani Beckwith, 5/9/2018 4:29 PM            "

## 2018-05-09 NOTE — DISCHARGE INSTRUCTIONS
Back Exercises  Back exercises help treat and prevent back injuries. The goal of back exercises is to increase the strength of your abdominal and back muscles and the flexibility of your back. These exercises should be started when you no longer have back pain. Back exercises include:  · Pelvic Tilt. Lie on your back with your knees bent. Tilt your pelvis until the lower part of your back is against the floor. Hold this position 5 to 10 sec and repeat 5 to 10 times.  · Knee to Chest. Pull first 1 knee up against your chest and hold for 20 to 30 seconds, repeat this with the other knee, and then both knees. This may be done with the other leg straight or bent, whichever feels better.  · Sit-Ups or Curl-Ups. Bend your knees 90 degrees. Start with tilting your pelvis, and do a partial, slow sit-up, lifting your trunk only 30 to 45 degrees off the floor. Take at least 2 to 3 seconds for each sit-up. Do not do sit-ups with your knees out straight. If partial sit-ups are difficult, simply do the above but with only tightening your abdominal muscles and holding it as directed.  · Hip-Lift. Lie on your back with your knees flexed 90 degrees. Push down with your feet and shoulders as you raise your hips a couple inches off the floor; hold for 10 seconds, repeat 5 to 10 times.  · Back arches. Lie on your stomach, propping yourself up on bent elbows. Slowly press on your hands, causing an arch in your low back. Repeat 3 to 5 times. Any initial stiffness and discomfort should lessen with repetition over time.  · Shoulder-Lifts. Lie face down with arms beside your body. Keep hips and torso pressed to floor as you slowly lift your head and shoulders off the floor.  Do not overdo your exercises, especially in the beginning. Exercises may cause you some mild back discomfort which lasts for a few minutes; however, if the pain is more severe, or lasts for more than 15 minutes, do not continue exercises until you see your caregiver.  Improvement with exercise therapy for back problems is slow.   See your caregivers for assistance with developing a proper back exercise program.     This information is not intended to replace advice given to you by your health care provider. Make sure you discuss any questions you have with your health care provider.     Document Released: 01/25/2006 Document Revised: 03/11/2013 Document Reviewed: 02/11/2016  [employer] Interactive Patient Education ©2016 [employer]        Low Back Strain  A strain is a stretch or tear in a muscle or the strong cords of tissue that attach muscle to bone (tendons). Strains of the lower back (lumbar spine) are a common cause of low back pain. A strain occurs when muscles or tendons are torn or are stretched beyond their limits. The muscles may become inflamed, resulting in pain and sudden muscle tightening (spasms). A strain can happen suddenly due to an injury (trauma), or it can develop gradually due to overuse.  There are three types of strains:  · Grade 1 is a mild strain involving a minor tear of the muscle fibers or tendons. This may cause some pain but no loss of muscle strength.  · Grade 2 is a moderate strain involving a partial tear of the muscle fibers or tendons. This causes more severe pain and some loss of muscle strength.  · Grade 3 is a severe strain involving a complete tear of the muscle or tendon. This causes severe pain and complete or nearly complete loss of muscle strength.  What are the causes?  This condition may be caused by:  · Trauma, such as a fall or a hit to the body.  · Twisting or overstretching the back. This may result from doing activities that require a lot of energy, such as lifting heavy objects.  What increases the risk?  The following factors may increase your risk of getting this condition:  · Playing contact sports.  · Participating in sports or activities that put excessive stress on the back and require a lot of bending and twisting,  including:  ¨ Lifting weights or heavy objects.  ¨ Gymnastics.  ¨ Soccer.  ¨ Figure skating.  ¨ Snowboarding.  · Being overweight or obese.  · Having poor strength and flexibility.  What are the signs or symptoms?  Symptoms of this condition may include:  · Sharp or dull pain in the lower back that does not go away. Pain may extend to the buttocks.  · Stiffness.  · Limited range of motion.  · Inability to stand up straight due to stiffness or pain.  · Muscle spasms.  How is this diagnosed?     This condition may be diagnosed based on:  · Your symptoms.  · Your medical history.  · A physical exam.  ¨ Your health care provider may push on certain areas of your back to determine the source of your pain.  ¨ You may be asked to bend forward, backward, and side to side to assess the severity of your pain and your range of motion.  · Imaging tests, such as:  ¨ X-rays.  ¨ MRI.  How is this treated?  Treatment for this condition may include:  · Applying heat and cold to the affected area.  · Medicines to help relieve pain and to relax your muscles (muscle relaxants).  · NSAIDs to help reduce swelling and discomfort.  · Physical therapy.  When your symptoms improve, it is important to gradually return to your normal routine as soon as possible to reduce pain, avoid stiffness, and avoid loss of muscle strength. Generally, symptoms should improve within 6 weeks of treatment. However, recovery time varies.  Follow these instructions at home:  Managing pain, stiffness, and swelling  · If directed, apply ice to the injured area during the first 24 hours after your injury.  ¨ Put ice in a plastic bag.  ¨ Place a towel between your skin and the bag.  ¨ Leave the ice on for 20 minutes, 2-3 times a day.  · If directed, apply heat to the affected area as often as told by your health care provider. Use the heat source that your health care provider recommends, such as a moist heat pack or a heating pad.  ¨ Place a towel between your  skin and the heat source.  ¨ Leave the heat on for 20-30 minutes.  ¨ Remove the heat if your skin turns bright red. This is especially important if you are unable to feel pain, heat, or cold. You may have a greater risk of getting burned.  Activity  · Rest and return to your normal activities as told by your health care provider. Ask your health care provider what activities are safe for you.  · Avoid activities that take a lot of effort (are strenuous) for as long as told by your health care provider.  · Do exercises as told by your health care provider.  General instructions  · Take over-the-counter and prescription medicines only as told by your health care provider.  · If you have questions or concerns about safety while taking pain medicine, talk with your health care provider.  · Do not drive or operate heavy machinery until you know how your pain medicine affects you.  · Do not use any tobacco products, such as cigarettes, chewing tobacco, and e-cigarettes. Tobacco can delay bone healing. If you need help quitting, ask your health care provider.  · Keep all follow-up visits as told by your health care provider. This is important.  How is this prevented?  · Warm up and stretch before being active.  · Cool down and stretch after being active.  · Give your body time to rest between periods of activity.  · Avoid:  ¨ Being physically inactive for long periods at a time.  ¨ Exercising or playing sports when you are tired or in pain.  · Use correct form when playing sports and lifting heavy objects.  · Use good posture when sitting and standing.  · Maintain a healthy weight.  · Sleep on a mattress with medium firmness to support your back.  · Make sure to use equipment that fits you, including shoes that fit well.  · Be safe and responsible while being active to avoid falls.  · Do at least 150 minutes of moderate-intensity exercise each week, such as brisk walking or water aerobics. Try a form of exercise that takes  stress off your back, such as swimming or stationary cycling.  · Maintain physical fitness, including:  ¨ Strength.  ¨ Flexibility.  ¨ Cardiovascular fitness.  ¨ Endurance.  Contact a health care provider if:  · Your back pain does not improve after 6 weeks of treatment.  · Your symptoms get worse.  Get help right away if:  · Your back pain is severe.  · You are unable to stand or walk.  · You develop pain in your legs.  · You develop weakness in your buttocks or legs.  · You have difficulty controlling when you urinate or when you have a bowel movement.  This information is not intended to replace advice given to you by your health care provider. Make sure you discuss any questions you have with your health care provider.  Document Released: 12/18/2006 Document Revised: 08/24/2017 Document Reviewed: 09/28/2016  [employer] Interactive Patient Education © 2017 [employer]

## 2018-05-09 NOTE — ED NOTES
Chief Complaint   Patient presents with   • Flank Pain     woke with right flank pain 2 days ago, she is not sure if this has something to do with her back problems or kidneys. Also co of feeling dizzy

## 2019-01-29 ENCOUNTER — APPOINTMENT (OUTPATIENT)
Dept: RADIOLOGY | Facility: MEDICAL CENTER | Age: 49
End: 2019-01-29
Attending: EMERGENCY MEDICINE
Payer: COMMERCIAL

## 2019-01-29 ENCOUNTER — HOSPITAL ENCOUNTER (EMERGENCY)
Facility: MEDICAL CENTER | Age: 49
End: 2019-01-29
Attending: EMERGENCY MEDICINE
Payer: COMMERCIAL

## 2019-01-29 VITALS
RESPIRATION RATE: 16 BRPM | BODY MASS INDEX: 26.33 KG/M2 | HEIGHT: 63 IN | WEIGHT: 148.59 LBS | DIASTOLIC BLOOD PRESSURE: 79 MMHG | SYSTOLIC BLOOD PRESSURE: 143 MMHG | TEMPERATURE: 97.3 F | HEART RATE: 71 BPM | OXYGEN SATURATION: 94 %

## 2019-01-29 DIAGNOSIS — J06.9 VIRAL UPPER RESPIRATORY TRACT INFECTION WITH COUGH: ICD-10-CM

## 2019-01-29 DIAGNOSIS — R07.89 COSTOCHONDRAL CHEST PAIN: ICD-10-CM

## 2019-01-29 PROCEDURE — 99284 EMERGENCY DEPT VISIT MOD MDM: CPT

## 2019-01-29 PROCEDURE — 302875 HCHG BANDAGE ACE 4 OR 6""

## 2019-01-29 PROCEDURE — 71101 X-RAY EXAM UNILAT RIBS/CHEST: CPT | Mod: RT

## 2019-01-29 ASSESSMENT — LIFESTYLE VARIABLES: DO YOU DRINK ALCOHOL: NO

## 2019-01-30 NOTE — ED PROVIDER NOTES
ED Provider Note    CHIEF COMPLAINT  Chief Complaint   Patient presents with   • Rib Pain     R side, x5 days secondary to coughing       HPI  Kristina Davis is a 48 y.o. female who presents to the emergency department chief complaint of right-sided rib pain.  Patient states she has had a sinus infection with nasal congestion and cough no nausea or vomiting or last few days she has had worsening right-sided anterior rib pain.  States she feels like there is a knot over the rib they are she denies any falls or trauma or any difficulty breathing.  She does smoke cigarettes and use alcohol but not daily.  She denies any fevers or chills or hemoptysis or any trauma to the area.    REVIEW OF SYSTEMS  Positives as above. Pertinent negatives include fevers chills hemoptysis abdominal pain  All other review of systems are negative    PAST MEDICAL HISTORY   has a past medical history of Backache (S/P L4-5); Bipolar I disorder, most recent episode (or current) unspecified; Cough; Displacement of lumbar intervertebral disc without myelopathy; Dizziness and giddiness; Irregular menstrual cycle; Lumbar pain ( MRI   9/2008); Need for Td vaccine ( needed); Osteoarthrosis involving, or with mention of more than one site, but not specified as generalized, multiple sites; Pap smear ( 1990); Pneumonia (hx.); Screening mammogram ( 1998); and Tobacco use disorder.    SOCIAL HISTORY  Social History     Social History Main Topics   • Smoking status: Current Every Day Smoker     Packs/day: 0.50     Years: 30.00     Types: Cigarettes   • Smokeless tobacco: Never Used   • Alcohol use Yes      Comment: ocassional   • Drug use: No      Comment: none currently but in the past did.   • Sexual activity: Not Currently      Comment:        SURGICAL HISTORY   has a past surgical history that includes lumbar laminectomy diskectomy (3/18/2009) and tubal coagulation laparoscopic bilateral.    CURRENT MEDICATIONS  Home Medications      "Reviewed by Lj Elizabeth R.N. (Registered Nurse) on 01/29/19 at 2215  Med List Status: Partial   Medication Last Dose Status   tramadol (ULTRAM) 50 MG Tab  Active                ALLERGIES  Allergies   Allergen Reactions   • Sulfa Drugs Hives     Causes angioderma and hives       PHYSICAL EXAM  VITAL SIGNS: BP (!) 163/84   Pulse 90   Temp 36.3 °C (97.3 °F) (Temporal)   Resp 16   Ht 1.6 m (5' 3\")   Wt 67.4 kg (148 lb 9.4 oz)   LMP 08/01/2013   SpO2 98%   BMI 26.32 kg/m²    Pulse ox interpretation: I interpret this pulse ox as normal.  Constitutional: Alert in no apparent distress.  HENT: Normocephalic, Atraumatic, MMM  Eyes: PERound. Conjunctiva normal, non-icteric.   Heart: Regular rate and rhythm, no murmurs.    Lungs: Clear to auscultation bilaterally. No resp distress, breath sounds equal, R Ant lower rib ttp and mild muscular ttp and swelling without skin changes  Abdomen: Non-tender, non-distended, normal bowel sounds  Skin: Warm, Dry, No erythema, No rash.   Neurologic: Alert and oriented, Grossly non-focal.       DIFFERENTIAL DIAGNOSIS AND WORK UP PLAN    This is a 48 y.o. female who presents with likely musculoskeletal strain and sprain secondary to a recent upper respiratory infection she does have clear lungs will evaluate for any type of irritation at the base of the lung via an x-ray we will treat patient with NSAIDs and ice pack and reassess low concern for pulmonary embolism or i intra-abdominal source of her discomfort.    Pertinent Lab Findings  Labs Reviewed - No data to display    Radiology  [id number] (WITH 1-VIEW CXR) RIGHT   Final Result      No displaced rib fracture.        The radiologist's interpretation of all radiological studies have been reviewed by me.    COURSE & MEDICAL DECISION MAKING  Pertinent Labs & Imaging studies reviewed. (See chart for details)    11:28 PM  I reassessed patient at the bedside I discussed there is no evidence of infection or effusion that " "would be causing the rib irritation this is likely pleurisy and costochondral discomfort secondary to her recent upper respiratory infection.  We discussed NSAIDs and ice packs she will be given an Ace wrap to help with the discomfort around the chest.  She will return to the ED for any new or worsening symptoms.  Patient is currently PERC with a low risk for pulmonary embolism    /79   Pulse 71   Temp 36.3 °C (97.3 °F) (Temporal)   Resp 16   Ht 1.6 m (5' 3\")   Wt 67.4 kg (148 lb 9.4 oz)   LMP 08/01/2013   SpO2 94%   BMI 26.32 kg/m²       The patient will return for new or worsening symptoms and is stable at the time of discharge.    The patient is referred to a primary physician for blood pressure management, diabetic screening, and for all other preventative health concerns.    DISPOSITION:  Patient will be discharged home in stable condition.    FOLLOW UP:  Osito Elizabeth M.D.  [address] [address]  [phone]    Schedule an appointment as soon as possible for a visit   for blood pressure recheck    [facility], Emergency Dept  [address] [address]  [phone]    If symptoms worsen      OUTPATIENT MEDICATIONS:  Discharge Medication List as of 1/29/2019 11:43 PM            FINAL IMPRESSION  1. Viral upper respiratory tract infection with cough    2. Costochondral chest pain        Electronically signed by: Amy Pierce, 1/29/2019 10:23 PM    This dictation has been created using voice recognition software and/or scribes. The accuracy of the dictation is limited by the abilities of the software and the expertise of the scribes. I expect there may be some errors of grammar and possibly content. I made every attempt to manually correct the errors within my dictation. However, errors related to voice recognition software and/or scribes may still exist and should be interpreted within the appropriate context.    "

## 2019-01-30 NOTE — ED NOTES
Pt ambulated to room with assistance from RN. Pain 4/10 in right rib cage. Agree with triage note. Assessment complete. Chart ready for ERP.

## 2019-01-30 NOTE — ED NOTES
Ace wrap placed around patient's chest. Pt given a second ace wrap to take home.   D/C instructions provided to patient at bedside, verbalizes understanding and states plans for follow-up with PCP as recommended.   All belongings accounted for, all questions answered at this time.   Pt ambulated to lobby without assistance, and with steady gait.

## 2019-01-30 NOTE — ED TRIAGE NOTES
"Chief Complaint   Patient presents with   • Rib Pain     R side, x5 days secondary to coughing     Blood pressure (!) 163/84, pulse 90, temperature 36.3 °C (97.3 °F), temperature source Temporal, resp. rate 16, height 1.6 m (5' 3\"), weight 67.4 kg (148 lb 9.4 oz), last menstrual period 08/01/2013, SpO2 98 %, not currently breastfeeding.    Pt ambulates with a steady gait to triage c/o R sided rib pain, states it feels like something is floating around. Pt states she has been sick with congestion, cough and thinks she may have cracked a rib coughing so hard.   "

## 2019-05-18 ENCOUNTER — NON-PROVIDER VISIT (OUTPATIENT)
Dept: OCCUPATIONAL MEDICINE | Facility: CLINIC | Age: 49
End: 2019-05-18
Payer: COMMERCIAL

## 2019-05-18 DIAGNOSIS — Z02.83 ENCOUNTER FOR DRUG SCREENING: ICD-10-CM

## 2019-05-18 PROCEDURE — 99026 IN-HOSPITAL ON CALL SERVICE: CPT | Performed by: PREVENTIVE MEDICINE

## 2019-05-19 ENCOUNTER — APPOINTMENT (OUTPATIENT)
Dept: RADIOLOGY | Facility: MEDICAL CENTER | Age: 49
End: 2019-05-19
Attending: EMERGENCY MEDICINE
Payer: COMMERCIAL

## 2019-05-19 ENCOUNTER — HOSPITAL ENCOUNTER (EMERGENCY)
Facility: MEDICAL CENTER | Age: 49
End: 2019-05-19
Attending: EMERGENCY MEDICINE
Payer: COMMERCIAL

## 2019-05-19 VITALS
WEIGHT: 145.28 LBS | RESPIRATION RATE: 17 BRPM | OXYGEN SATURATION: 96 % | HEIGHT: 63 IN | DIASTOLIC BLOOD PRESSURE: 64 MMHG | HEART RATE: 89 BPM | BODY MASS INDEX: 25.74 KG/M2 | TEMPERATURE: 97.4 F | SYSTOLIC BLOOD PRESSURE: 124 MMHG

## 2019-05-19 DIAGNOSIS — M65.331 TRIGGER MIDDLE FINGER OF RIGHT HAND: ICD-10-CM

## 2019-05-19 PROCEDURE — 96372 THER/PROPH/DIAG INJ SC/IM: CPT

## 2019-05-19 PROCEDURE — 99284 EMERGENCY DEPT VISIT MOD MDM: CPT

## 2019-05-19 PROCEDURE — 73140 X-RAY EXAM OF FINGER(S): CPT | Mod: RT

## 2019-05-19 PROCEDURE — 700111 HCHG RX REV CODE 636 W/ 250 OVERRIDE (IP): Performed by: EMERGENCY MEDICINE

## 2019-05-19 RX ORDER — NAPROXEN 500 MG/1
500 TABLET ORAL
Qty: 10 TAB | Refills: 0 | Status: SHIPPED | OUTPATIENT
Start: 2019-05-19 | End: 2019-05-22

## 2019-05-19 RX ORDER — KETOROLAC TROMETHAMINE 30 MG/ML
30 INJECTION, SOLUTION INTRAMUSCULAR; INTRAVENOUS ONCE
Status: COMPLETED | OUTPATIENT
Start: 2019-05-19 | End: 2019-05-19

## 2019-05-19 RX ADMIN — KETOROLAC TROMETHAMINE 30 MG: 30 INJECTION, SOLUTION INTRAMUSCULAR; INTRAVENOUS at 11:32

## 2019-05-19 NOTE — ED PROVIDER NOTES
ED Provider Note    ER PROVIDER NOTE        CHIEF COMPLAINT  Chief Complaint   Patient presents with   • Digit Pain     pt reports she injured her R middle finger at work but does not know how.  Pt reports it is currently bent and she cannot straighten it.  Pt reports injury x 3-4 weeks.       HPI  Kristina Davis is a 49 y.o. female who presents to the emergency department complaining of finger pain and stiffness.  Patient reports over the last 3 to 4 weeks she has had issues with the finger and seems to be worsening.  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  She states that it often feels stiff and difficult to bend in the morning and feels like it is catching.  She denies any fevers or chills, denies any other joint or extremity pain.  No focal weakness numbness or tingling    REVIEW OF SYSTEMS  Pertinent positives include finger pain. Pertinent negatives include no fever. See HPI for details. All other systems reviewed and are negative.    PAST MEDICAL HISTORY   has a past medical history of Backache (S/P L4-5); Bipolar I disorder, most recent episode (or current) unspecified; Cough; Displacement of lumbar intervertebral disc without myelopathy; Dizziness and giddiness; Irregular menstrual cycle; Lumbar pain ( MRI   9/2008); Need for Td vaccine ( needed); Osteoarthrosis involving, or with mention of more than one site, but not specified as generalized, multiple sites; Pap smear ( 1990); Pneumonia (hx.); Screening mammogram ( 1998); and Tobacco use disorder.    SOCIAL HISTORY  Social History   Substance Use Topics   • Smoking status: Current Every Day Smoker     Packs/day: 0.50     Years: 30.00     Types: Cigarettes   • Smokeless tobacco: Never Used   • Alcohol use Yes      Comment: ocassional       SURGICAL HISTORY   has a past surgical history that includes lumbar laminectomy diskectomy (3/18/2009) and tubal coagulation laparoscopic  "bilateral.    CURRENT MEDICATIONS  Home Medications    **Home medications have not yet been reviewed for this encounter**         ALLERGIES  Allergies   Allergen Reactions   • Sulfa Drugs Hives     Causes angioderma and hives       PHYSICAL EXAM  VITAL SIGNS: /68   Pulse (!) 103   Temp 36.3 °C (97.4 °F) (Temporal)   Resp 17   Ht 1.6 m (5' 3\")   Wt 65.9 kg (145 lb 4.5 oz)   LMP 08/01/2013   SpO2 96%   BMI 25.74 kg/m²   Pulse ox interpretation: I interpret this pulse ox as normal.    Constitutional: Alert.  In no apparent distress.  HENT: Normocephalic, Atraumatic, Bilateral external ears normal. Nose normal.   Eyes: Pupils are equal and reactive. Conjunctiva normal, non-icteric.   Heart: Regular rate and rhythm, no murmurs.    Lungs: Clear to auscultation bilaterally.  Skin: Warm, Dry, No erythema, No rash.   Musculoskeletal: Tenderness over PIP of third digit right hand, primarily on the volar although some on the dorsal aspect, nontender on MCP and DIP, no obvious swelling or erythema  I am able to passively range her at MCP and DIP without any pain she does have some pain although I feel no catching with range of PIP, otherwise no tenderness or major deformities noted. No edema.  Distal capillary refill less than 2 seconds, distal sensation intact light touch  Neurologic: Alert, Grossly non-focal.   Psychiatric: Affect normal, Judgment normal, Mood normal, Appears appropriate and not intoxicated.     DIAGNOSTIC STUDIES / PROCEDURES      RADIOLOGY  DX-FINGER(S) 2+ RIGHT   Final Result      No evidence of fracture or dislocation.        The radiologist's interpretation of all radiological studies have been reviewed by me.    COURSE & MEDICAL DECISION MAKING  Nursing notes, LUIS ANGEL ROJOx reviewed in chart.    10:44 AM - Patient seen and examined at bedside. Patient will be treated with ketorolac. Ordered for x-ray to evaluate her symptoms.     Patient has been given Toradol, placed in splint      Decision " Making:  This is a 49 y.o. female presenting with pain to her finger.  On exam this does seem most consistent with a trigger finger.  This may be work-related with her repetitive motions, and she will follow-up with occupational health in 2 days.  Her x-ray shows no findings of acute traumatic injury.  She is placed in a splint, given anti-inflammatories as well.  No findings suggestive of infectious process or neurovascular compromise at this time    The patient will return for new or worsening symptoms and is stable at the time of discharge.    The patient is referred to a primary physician for blood pressure management, diabetic screening, and for all other preventative health concerns.  DISPOSITION:  Patient will be discharged home in stable condition.    FOLLOW UP:  [address]  [address]  [facility] [address] [phone]  In 2 days        OUTPATIENT MEDICATIONS:  New Prescriptions    NAPROXEN (NAPROSYN) 500 MG TAB    Take 1 Tab by mouth 2 times daily with meals as needed (pain) for up to 3 days.         FINAL IMPRESSION  1. Trigger middle finger of right hand      The note accurately reflects work and decisions made by me.  Wade Linares  5/19/2019  11:40 AM

## 2019-05-19 NOTE — ED NOTES
Splint placed to patient finger, given d/c instructions and prescription, verbalized understanding. AAOx4, VSS, d/c'd home.

## 2019-05-19 NOTE — LETTER
"  FORM C-4:  EMPLOYEE’S CLAIM FOR COMPENSATION/ REPORT OF INITIAL TREATMENT  EMPLOYEE’S CLAIM - PROVIDE ALL INFORMATION REQUESTED   First Name  Kristina Last Name  Arun-Random Birthdate             Age  1970 49 y.o. 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 (if applicable)  putting wheel barrels on pallet    How did this injury or occupational disease occur? Be specific and answer in detail. 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 Staple guns & drills putting together rakes, hoes ect.   If you believe that you have an occupational disease, when did you first have knowledge of the disability and it relationship to your employment?  N/A Witnesses to the Accident  Unknown     Nature of Injury or Occupational Disease  Workers' Compensation  Part(s) of Body Injured or Affected  Finger (R), N/A, N/A    I certify that the above is true and correct to the best " of my knowledge and that I have provided this information in order to obtain the benefits of [place] or [place]  I hereby authorize any physician, chiropractor, surgeon, practitioner, or other person, any hospital, including [facility] or [facility], any medical service organization, any insurance company, or other institution or organization to release to each other, any medical or other information, including benefits paid or payable, pertinent to this injury or disease, except information relative to diagnosis, treatment and/or counseling for AIDS, psychological conditions, alcohol or controlled substances, for which I must give specific authorization.  A Photostat of this authorization shall be as valid as the original.   Date  05/19/2019 [facility] Employee’s Signature   THIS REPORT MUST BE COMPLETED AND MAILED WITHIN 3 WORKING DAYS OF TREATMENT   Place  [facility], EMERGENCY DEPT  Name of Facility   [facility]   Date  5/19/2019 Diagnosis  (M65.331) Trigger middle finger of right hand Is there evidence the injured employee was under the influence of alcohol and/or another controlled substance at the time of accident?   Hour  11:38 AM Description of Injury or Disease  Trigger middle finger of right hand No   Treatment  splint  Have you advised the patient to remain off work five days or more?         No   X-Ray Findings  Negative   If Yes   From Date    To Date      From information given by the employee, together with medical evidence, can you directly connect this injury or occupational disease as job incurred?  No If No, is the employee capable of: Full Duty  No Modified Duty  Yes   Is additional medical care by a physician indicated?  Yes If Modified Duty, Specify any Limitations / Restrictions  Limited use of right hand for fine  "motor activity or lifting for 2 days     Do you know of any previous injury or disease contributing to this condition or occupational disease?  No   Date  5/19/2019 Print Doctor’s Name  Terrence Linares I certify the employer’s copy of this form was mailed on:   Address  [address] [address] [phone] Insurer’s Use Only   [facility]  [address]    Provider’s Tax ID Number    Telephone  Dept: [phone]    Doctor’s Signature  e-TERRENCE Maurer M.D. Degree   M.D.    Original - TREATING PHYSICIAN OR CHIROPRACTOR   Pg 2-Insurer/TPA   Pg 3-Employer   Pg 4-Employee                                                                                                  Form C-4 (rev01/03)   BRIEF DESCRIPTION OF RIGHTS AND BENEFITS  (Pursuant to [place] 616C.050)  Notice of Injury or Occupational Disease (Incident Report Form C-1): If an injury or occupational disease (OD) arises out of and in the course of employment, you must provide written notice to your employer as soon as practicable, but no later than 7 days after the accident or OD. Your employer shall maintain a sufficient supply of the required forms.  Claim for Compensation (Form C-4): If medical treatment is sought, the form C-4 is available at the place of initial treatment. A completed \"Claim for Compensation\" (Form C-4) must be filed within 90 days after an accident or OD. The treating physician or chiropractor must, within 3 working days after treatment, complete and mail to the employer, the employer's insurer and third-party , the Claim for Compensation.  Medical Treatment: If you require medical treatment for your on-the-job injury or OD, you may be required to select a physician or chiropractor from a list provided by your workers’ compensation insurer, if it has contracted with an Organization for Managed Care (MCO) or Preferred Provider Organization (PPO) or providers of health care. If your employer has " not entered into a contract with an MCO or PPO, you may select a physician or chiropractor from the Panel of Physicians and Chiropractors. Any medical costs related to your industrial injury or OD will be paid by your insurer.  Temporary Total Disability (TTD): If your doctor has certified that you are unable to work for a period of at least 5 consecutive days, or 5 cumulative days in a 20-day period, or places restrictions on you that your employer does not accommodate, you may be entitled to TTD compensation.  Temporary Partial Disability (TPD): If the wage you receive upon reemployment is less than the compensation for TTD to which you are entitled, the insurer may be required to pay you TPD compensation to make up the difference. TPD can only be paid for a maximum of 24 months.  Permanent Partial Disability (PPD): When your medical condition is stable and there is an indication of a PPD as a result of your injury or OD, within 30 days, your insurer must arrange for an evaluation by a rating physician or chiropractor to determine the degree of your PPD. The amount of your PPD award depends on the date of injury, the results of the PPD evaluation and your age and wage.  Permanent Total Disability (PTD): If you are medically certified by a treating physician or chiropractor as permanently and totally disabled and have been granted a PTD status by your insurer, you are entitled to receive monthly benefits not to exceed 66 2/3% of your average monthly wage. The amount of your PTD payments is subject to reduction if you previously received a PPD award.  Vocational Rehabilitation Services: You may be eligible for vocational rehabilitation services if you are unable to return to the job due to a permanent physical impairment or permanent restrictions as a result of your injury or occupational disease.  Transportation and Per Chicho Reimbursement: You may be eligible for travel expenses and per chicho associated with medical  treatment.  Reopening: You may be able to reopen your claim if your condition worsens after claim closure.  Appeal Process: If you disagree with a written determination issued by the insurer or the insurer does not respond to your request, you may appeal to the Department of Administration, , by following the instructions contained in your determination letter. You must appeal the determination within 70 days from the date of the determination letter at [address], [address], [place], [place] [address], or [address], [address], [place], [place] [address]. If you disagree with the  decision, you may appeal to the Department of Administration, . You must file your appeal within 30 days from the date of the  decision letter at [address], [address], [place], [place] [address], or [address], [place], [place] [address]. If you disagree with a decision of an , you may file a petition for judicial review with the District Court. You must do so within 30 days of the Appeal Officer’s decision. You may be represented by an  at your own expense or you may contact the [facility] for possible representation.  [place]  for Injured Workers (NAIW): If you disagree with a  decision, you may request that NAIW represent you without charge at an  Hearing. For information regarding denial of benefits, you may contact the [facility] at: [address], [address], [place] [address], [phone], or [address], [address], [place] [address], [phone]  To File a Complaint with the Division: If you wish to file a complaint with the  of the Division of Industrial Relations (DIR), please contact the Workers’ Compensation Section, [address], [address], [place], [place] [address], telephone [phone], or [address], [address], [place], [place] [address], telephone [phone].  For assistance with Workers’ Compensation Issues: you may contact the Office of the Governor Consumer Health Assistance, [address] [place] [address], [address], [place], [place] [address], Toll Free 1-154.661.8401, Web site: [email or web address].[email or web address]., E-mail [email or web address].                                                                                                                                                                               __________________________________________________________________                                    _05/19/2019_            Employee Name / Signature                                                                                                                            Date                                       D-2 (rev. 10/07)

## 2019-05-19 NOTE — ED TRIAGE NOTES
"Kristinashonda Luevanoee Arun-Random 49 y.o. female ambulatory to triage for     Chief Complaint   Patient presents with   • Digit Pain     pt reports she injured her R middle finger at work but does not know how.  Pt reports it is currently bent and she cannot straighten it.  Pt reports injury x 3-4 weeks.     /68   Pulse (!) 103   Temp 36.3 °C (97.4 °F) (Temporal)   Resp 17   Ht 1.6 m (5' 3\")   Wt 65.9 kg (145 lb 4.5 oz)   LMP 08/01/2013   SpO2 96%   BMI 25.74 kg/m²   Pt to the lobby to await bed assignment.  Advised to return to the triage desk for any changes/concerns.  "

## 2019-05-21 ENCOUNTER — OCCUPATIONAL MEDICINE (OUTPATIENT)
Dept: URGENT CARE | Facility: CLINIC | Age: 49
End: 2019-05-21
Payer: COMMERCIAL

## 2019-05-21 VITALS
HEART RATE: 93 BPM | DIASTOLIC BLOOD PRESSURE: 78 MMHG | OXYGEN SATURATION: 99 % | SYSTOLIC BLOOD PRESSURE: 124 MMHG | WEIGHT: 145 LBS | HEIGHT: 63 IN | BODY MASS INDEX: 25.69 KG/M2 | TEMPERATURE: 98 F | RESPIRATION RATE: 16 BRPM

## 2019-05-21 DIAGNOSIS — M65.331 TRIGGER MIDDLE FINGER OF RIGHT HAND: ICD-10-CM

## 2019-05-21 PROCEDURE — 99213 OFFICE O/P EST LOW 20 MIN: CPT | Performed by: PHYSICIAN ASSISTANT

## 2019-05-21 NOTE — PROGRESS NOTES
"Subjective:      Kristina Davis is a 49 y.o. female who presents with Work-Related Injury (New to us from ER W/C [facility] DOI 4/19/2019, (R) middle finger, was not able to bend it or straighten finger. C-4 complete. Needs d-39)      DOI: 4/19/2019  Pt notes thought she strained her right middle finger on 4/19/2019.  She denies memorable injury.  She states she been working a new job \"not too long at that time\" when she felt catching and pain to base of finger over palm.  She noted then this last weekend that she awoke from sleep and was unable to straighten her finger.  In the weeks prior she had to use her other hand to help finger straighten or flex.  She denies past medical history of similar.  She denies history of surgery to right hand.  She denies numbness or tingling.  She tried anti-inflammatories for the last 4 to 6 weeks with little change in symptoms.  She was seen to the ER 2 days ago placed in a extension splint and on anti-inflammatories with no change in symptoms thus far     HPI    ROS       Objective:     /78   Pulse 93   Temp 36.7 °C (98 °F) (Temporal)   Resp 16   Ht 1.6 m (5' 3\")   Wt 65.8 kg (145 lb)   LMP 08/01/2013   SpO2 99%   BMI 25.69 kg/m²      Physical Exam    Gen: AOx3; Head: NC AT; Eyes: PERRLA/EOM; Lungs: NLR; Cardiac: RR by periph pulse exam; right hand: No effusion erythema or ecchymosis, limited active range or motion with patient concerned about flexing middle finger, mild palpable tenderness over pulley over palm of right hand, mild catching with passive range of motion; neuro: N VID, sensation intact light touch, brisk capillary refill throughout       Assessment/Plan:     1. Trigger middle finger of right hand  Continue restricted duty, splint in place, 10 pound restriction, okay to come out of splint for range of motion, and over-the-counter anti-inflammatories, follow-up with orthopedics referral placed today   - REFERRAL TO ORTHOPEDICS      "

## 2019-05-21 NOTE — LETTER
"EMPLOYEE’S CLAIM FOR COMPENSATION/ REPORT OF INITIAL TREATMENT  FORM C-4    EMPLOYEE’S CLAIM - PROVIDE ALL INFORMATION REQUESTED   First Name  Kristina Last Name  Arun-Random Birthdate                    1970                Sex  female Claim Number   Home Address  [address] Age  49 y.o. 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 (if applicable)  putting wheel barrels on pallet    How did this injury or occupational disease occur? (Be specific an answer in detail. 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 Staple guns & drills putting together rakes, hoes ect.   If you believe that you have an occupational disease, when did you first have knowledge of the disability and it relationship to your employment?  N/A Witnesses to the Accident  Unknown      Nature of Injury or Occupational Disease  Workers' Compensation  Part(s) of Body Injured or Affected  Finger (R), N/A, N/A    I certify that the above is true and correct to " the best of my knowledge and that I have provided this information in order to obtain the benefits of [place]  I hereby authorize any physician, chiropractor, surgeon, practitioner, or other person, any hospital, including [facility] or [facility], any medical service organization, any insurance company, or other institution or organization to release to each other, any medical or other information, including benefits paid or payable, pertinent to this injury or disease, except information relative to diagnosis, treatment and/or counseling for AIDS, psychological conditions, alcohol or controlled substances, for which I must give specific authorization.  A Photostat of this authorization shall be as valid as the original.     Date   Place   Employee’s Signature   THIS REPORT MUST BE COMPLETED AND MAILED WITHIN 3 WORKING DAYS OF TREATMENT   Place  [facility] [place]  Name of Facility  [facility]   Date  5/21/2019 Diagnosis  (M65.331) Trigger middle finger of right hand Is there evidence the injured employee was under the influence of alcohol and/or another controlled substance at the time of accident?   Hour  8:34 AM Description of Injury or Disease  The encounter diagnosis was Trigger middle finger of right hand. No   Treatment  Continue restricted duty, splint in place, 10 pound restriction, okay to come out of splint for range of motion, and over-the-counter anti-inflammatories, follow-up with orthopedics referral placed today   Have you advised the patient to remain off work five days or more? No   X-Ray Findings      If Yes   From Date  To Date      From information given by the employee, together with medical evidence, can you directly connect this injury or occupational disease as job incurred?    Comments:no PMH or mechanism of injury, likely older problem If No Full Duty  No  "Modified Duty  Yes   Is additional medical care by a physician indicated?  Yes If Modified Duty, Specify any Limitations / Restrictions  Continue restricted duty, splint in place, 10 pound restriction, okay to come out of splint for range of motion, and over-the-counter anti-inflammatories, follow-up with orthopedics referral placed today    Do you know of any previous injury or disease contributing to this condition or occupational disease?                            No   Date  5/21/2019 Print Doctor’s Name Shemar Maki P.A.-C. I certify the employer’s copy of  this form was mailed on:   Address  [address] Insurer’s Use Only     [place] Zip  [address]    Provider’s Tax ID Number  [id number] Telephone  Dept: [phone]            e-Signature: Dr. Juan Hsu, Medical Director Degree  [place]        ORIGINAL-TREATING PHYSICIAN OR CHIROPRACTOR    PAGE 2-INSURER/TPA    PAGE 3-EMPLOYER    PAGE 4-EMPLOYEE             Form C-4 (rev10/07)              BRIEF DESCRIPTION OF RIGHTS AND BENEFITS  (Pursuant to [place] 616C.050)    Notice of Injury or Occupational Disease (Incident Report Form C-1): If an injury or occupational disease (OD) arises out of and in the  course of employment, you must provide written notice to your employer as soon as practicable, but no later than 7 days after the accident or  OD. Your employer shall maintain a sufficient supply of the required forms.    Claim for Compensation (Form C-4): If medical treatment is sought, the form C-4 is available at the place of initial treatment. A completed  \"Claim for Compensation\" (Form C-4) must be filed within 90 days after an accident or OD. The treating physician or chiropractor must,  within 3 working days after treatment, complete and mail to the employer, the employer's insurer and third-party , the Claim for  Compensation.    Medical Treatment: If you require medical treatment for your on-the-job injury or OD, you " may be required to select a physician or  chiropractor from a list provided by your workers’ compensation insurer, if it has contracted with an Organization for Managed Care (MCO) or  Preferred Provider Organization (PPO) or providers of health care. If your employer has not entered into a contract with an MCO or PPO, you  may select a physician or chiropractor from the Panel of Physicians and Chiropractors. Any medical costs related to your industrial injury or  OD will be paid by your insurer.    Temporary Total Disability (TTD): If your doctor has certified that you are unable to work for a period of at least 5 consecutive days, or 5  cumulative days in a 20-day period, or places restrictions on you that your employer does not accommodate, you may be entitled to TTD  compensation.    Temporary Partial Disability (TPD): If the wage you receive upon reemployment is less than the compensation for TTD to which you are  entitled, the insurer may be required to pay you TPD compensation to make up the difference. TPD can only be paid for a maximum of 24  months.    Permanent Partial Disability (PPD): When your medical condition is stable and there is an indication of a PPD as a result of your injury or  OD, within 30 days, your insurer must arrange for an evaluation by a rating physician or chiropractor to determine the degree of your PPD. The  amount of your PPD award depends on the date of injury, the results of the PPD evaluation and your age and wage.    Permanent Total Disability (PTD): If you are medically certified by a treating physician or chiropractor as permanently and totally disabled  and have been granted a PTD status by your insurer, you are entitled to receive monthly benefits not to exceed 66 2/3% of your average  monthly wage. The amount of your PTD payments is subject to reduction if you previously received a PPD award.    Vocational Rehabilitation Services: You may be eligible for vocational  rehabilitation services if you are unable to return to the job due to a  permanent physical impairment or permanent restrictions as a result of your injury or occupational disease.    Transportation and Per Chicho Reimbursement: You may be eligible for travel expenses and per chicho associated with medical treatment.    Reopening: You may be able to reopen your claim if your condition worsens after claim closure.    Appeal Process: If you disagree with a written determination issued by the insurer or the insurer does not respond to your request, you may  appeal to the Department of Administration, , by following the instructions contained in your determination letter. You must  appeal the determination within 70 days from the date of the determination letter at [address], [address], [place], [place]  [address], or [address], [address], [place], [place] [address]. If you disagree with the  decision, you may appeal to the  Department of Administration, . You must file your appeal within 30 days from the date of the  decision  letter at [address], [address], [place], [place] [address], or [address], [address], [place], [place] [address]. If you  disagree with a decision of an , you may file a petition for judicial review with the District Court. You must do so within 30  days of the Appeal Officer’s decision. You may be represented by an  at your own expense or you may contact the [facility] for possible  representation.    [place]  for Injured Workers (NAIW): If you disagree with a  decision, you may request that NAIW represent you  without charge at an  Hearing. For information regarding denial of benefits, you may contact the [facility] at: [address], [address], [place] [address], [phone], or [address], [address], [place] [address],  [phone]    To File a Complaint with the Division: If you wish to file a complaint with the  of the Division of Industrial Relations (DIR),  please contact the Workers’ Compensation Section, [address], [address], [place], [place] [address], telephone [phone]) [phone], or  [address], [address], [place], [place] [address], telephone [phone].    For assistance with Workers’ Compensation Issues: you may contact the Office of the Governor Consumer Health Assistance, [address] [place] [address], [address], [place], [place] [address], Toll Free 1-720.488.2950, Web site: http://[employer].[facility] [place].nv.us, E-mail  Domi@[facility] [place].nv.                                                                                                                                                                                                                                   __________________________________________________________________                                                                   _________________                Employee Name / Signature                                                                                                                                                       Date                                                                                                                                                                                                     D-2 (rev. 10/07)

## 2019-05-21 NOTE — LETTER
"   [facility]  [address] [address]  Phone:  [phone] - Fax:  [phone]   [facility] Progress Report and Disability Certification  Date of Service: 5/21/2019   No Show:  No  Date / Time of Next Visit: 5/30/2019@4:15pm   Claim Information   Patient Name: Kristina Dias-Mihir  Claim Number:     Employer: [employer]  Date of Injury: 4/19/2019     Insurer / TPA: Donald  ID / SSN:     Occupation: [employer]  Diagnosis: The encounter diagnosis was Trigger middle finger of right hand.    Medical Information   Related to Industrial Injury?   Comments:no PMH or mechanism of injury, likely older problem     Subjective Complaints:  DOI: 4/19/2019  Pt notes thought she strained her right middle finger on 4/19/2019.  She denies memorable injury.  She states she been working a new job \"not too long at that time\" when she felt catching and pain to base of finger over palm.  She noted then this last weekend that she awoke from sleep and was unable to straighten her finger.  In the weeks prior she had to use her other hand to help finger straighten or flex.  She denies past medical history of similar.  She denies history of surgery to right hand.  She denies numbness or tingling.  She tried anti-inflammatories for the last 4 to 6 weeks with little change in symptoms.  She was seen to the ER 2 days ago placed in a extension splint and on anti-inflammatories with no change in symptoms thus far   Objective Findings: Gen: AOx3; Head: NC AT; Eyes: PERRLA/EOM; Lungs: NLR; Cardiac: RR by periph pulse exam; right hand: No effusion erythema or ecchymosis, limited active range or motion with patient concerned about flexing middle finger, mild palpable tenderness over pulley over palm of right hand, mild catching with passive range of motion; neuro: N VID, sensation intact light touch, brisk capillary refill throughout   Pre-Existing Condition(s):     Assessment:   " Condition Same    Status: Additional Care Required  Permanent Disability:No    Plan:   Comments:Continue restricted duty, splint in place, 10 pound restriction, okay to come out of splint for range of motion, and over-the-counter anti-inflammatories, follow-up with orthopedics referral placed today      Diagnostics:      Comments:       Disability Information   Status: Released to Restricted Duty    From:  5/21/2019  Through: 6/4/2019 Restrictions are: Temporary   Physical Restrictions   Sitting:    Standing:    Stooping:    Bending:      Squatting:    Walking:    Climbing:    Pushing:      Pulling:    Other:    Reaching Above Shoulder (L):   Reaching Above Shoulder (R):       Reaching Below Shoulder (L):    Reaching Below Shoulder (R):      Not to exceed Weight Limits   Carrying(hrs):   Weight Limit(lb): < or = to 10 pounds  Comments:RUE Lifting(hrs):   Weight  Limit(lb): < or = to 10 pounds  Comments:RUE   Comments: Continue restricted duty, splint in place, 10 pound restriction, okay to come out of splint for range of motion, and over-the-counter anti-inflammatories, follow-up with orthopedics referral placed today     Repetitive Actions   Hands: i.e. Fine Manipulations from Grasping:     Feet: i.e. Operating Foot Controls:     Driving / Operate Machinery:     Physician Name: Shemar Maki P.A.-C. Physician Signature:   e-Signature: Dr. Juan Hsu, Medical Director   Clinic Name / Location: [address] [address] Clinic Phone Number: Dept: [phone]   Appointment Time: 8:30 Am Visit Start Time: 8:34 AM   Check-In Time:  8:25 Am Visit Discharge Time:  9:34am   Original-Treating Physician or Chiropractor    Page 2-Insurer/TPA    Page 3-Employer    Page 4-Employee

## 2019-05-29 ENCOUNTER — TELEPHONE (OUTPATIENT)
Dept: OCCUPATIONAL MEDICINE | Facility: CLINIC | Age: 49
End: 2019-05-29

## 2019-06-05 ENCOUNTER — NON-PROVIDER VISIT (OUTPATIENT)
Dept: URGENT CARE | Facility: MEDICAL CENTER | Age: 49
End: 2019-06-05

## 2019-06-05 DIAGNOSIS — Z02.1 PRE-EMPLOYMENT DRUG SCREENING: ICD-10-CM

## 2019-06-05 LAB
AMP AMPHETAMINE: NORMAL
BAR BARBITURATES: NORMAL
BZO BENZODIAZEPINES: NORMAL
COC COCAINE: NORMAL
INT CON NEG: NORMAL
INT CON POS: NORMAL
MDMA ECSTASY: NORMAL
MET METHAMPHETAMINES: NORMAL
MTD METHADONE: NORMAL
OPI OPIATES: NORMAL
OXY OXYCODONE: NORMAL
PCP PHENCYCLIDINE: NORMAL
POC URINE DRUG SCREEN OCDRS: NEGATIVE
THC: NORMAL

## 2019-06-05 PROCEDURE — 80305 DRUG TEST PRSMV DIR OPT OBS: CPT | Performed by: NURSE PRACTITIONER

## 2019-06-18 ENCOUNTER — HOSPITAL ENCOUNTER (EMERGENCY)
Facility: MEDICAL CENTER | Age: 49
End: 2019-06-18
Attending: EMERGENCY MEDICINE

## 2019-06-18 VITALS
DIASTOLIC BLOOD PRESSURE: 71 MMHG | OXYGEN SATURATION: 95 % | RESPIRATION RATE: 18 BRPM | BODY MASS INDEX: 26.48 KG/M2 | SYSTOLIC BLOOD PRESSURE: 128 MMHG | HEART RATE: 87 BPM | TEMPERATURE: 98 F | WEIGHT: 149.47 LBS

## 2019-06-18 DIAGNOSIS — M65.341 TRIGGER RING FINGER OF RIGHT HAND: ICD-10-CM

## 2019-06-18 PROCEDURE — 99282 EMERGENCY DEPT VISIT SF MDM: CPT

## 2019-06-18 NOTE — ED TRIAGE NOTES
Pt ambulated to triage with   Chief Complaint   Patient presents with   • Follow-Up     initial workmans comp but was denied, going to court for it   • Digit Pain     right middle finger and ring finger, swelling and bruising noted. pt reports that she has been trying the finger exercises and had slpint on it but pain and swelling continues, intially was in the right middle finger but now going into the right ring finger      Pt Informed regarding triage process and verbalized understanding to inform triage tech or RN for any changes in condition. Placed in lobby.

## 2019-06-18 NOTE — ED PROVIDER NOTES
ED Provider Note    CHIEF COMPLAINT  Chief Complaint   Patient presents with   • Follow-Up     initial workmans comp but was denied, going to court for it   • Digit Pain     right middle finger and ring finger, swelling and bruising noted. pt reports that she has been trying the finger exercises and had slpint on it but pain and swelling continues, intially was in the right middle finger but now going into the right ring finger       HPI  Kristina Davis is a 49 y.o. female who presents for evaluation of finger pain.  The patient was seen in the emerge department 1 month ago for pain to her right middle finger.  X-rays at that time were unremarkable.  The patient was diagnosed with trigger finger.  The patient was splinted.  She is been attempting to get this taken care of through Workmen's Comp. and is currently undergoing evaluation in court.  The [facility] physician told her she should follow-up with a surgeon.  The patient presents here for evaluation.  Patient has no new symptomatology or complaints.  She describes inability to completely extend her finger and occasional popping over the volar aspect of the finger.    REVIEW OF SYSTEMS  See HPI for further details.  No history of diabetes, seizures, cardiopulmonary disorders, gastrointestinal disorders.    PAST MEDICAL HISTORY  Past Medical History:   Diagnosis Date   • Backache S/P L4-5    decompressive laminectomies with bilateral foramionotomies   • Bipolar I disorder, most recent episode (or current) unspecified     untreated     • Cough    • Displacement of lumbar intervertebral disc without myelopathy     right sided sciatica   • Dizziness and giddiness    • Irregular menstrual cycle     menorrhagia lifelong   • Lumbar pain  MRI   9/2008    MRI (9/2008) Left lateral disk protusion L5-S1 with left S1 root compression,small central disk  protusion at the L4-L5 with effacement of the ventral sac and small radial tear of the annulus as well as mild  right lateral disk bulge at L4-L5 with neuro foraminal stenosis.   • Need for Td vaccine  needed   • Osteoarthrosis involving, or with mention of more than one site, but not specified as generalized, multiple sites    • Pap smear  1990   • Pneumonia hx.   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 and feels that the symptoms are related to work;    SURGICAL HISTORY  Past Surgical History:   Procedure Laterality Date   • LUMBAR LAMINECTOMY DISKECTOMY  3/18/2009    Performed by RIP GAMA at [facility]   • TUBAL COAGULATION LAPAROSCOPIC BILATERAL         CURRENT MEDICATIONS  See nurse's notes    ALLERGIES  Allergies   Allergen Reactions   • Sulfa Drugs Hives     Causes angioderma and hives       PHYSICAL EXAM  VITAL SIGNS: /71   Pulse 87   Temp 36.7 °C (98 °F) (Temporal)   Resp 18   Wt 67.8 kg (149 lb 7.6 oz)   LMP 08/01/2013   SpO2 95%   BMI 26.48 kg/m²    Constitutional: 49-year-old female, well developed, Well nourished, No acute distress, Non-toxic appearance.   HENT: Normocephalic, Atraumatic,   Cardiovascular: Regular rate and rhythm without mumurs, gallups, rubs   Thorax & Lungs: Normal Equal breath sounds, No respiratory distress, No wheezing, no stridor, no rales. No chest tenderness.   Musculoskeletal: Examination of the right third finger reveals the finger to be in a flexed position with tenderness at the PIP joint over both the dorsal and volar aspect of the finger extending toward the palm of the hand over the volar surface; the patient has inability to completely extend the finger;  Negative Knievel sign; motor, sensory, vascular intact distally;  Neurologic: Alert & oriented x 3,  No gross focal  deficits noted.       RADIOLOGY/PROCEDURES  Previous imaging studies were reviewed    COURSE & MEDICAL DECISION MAKING  Pertinent Labs & Imaging studies reviewed. (See chart for details)  Discussion: At this time, patient has findings suggestive of a trigger finger.  I see no evidence of acute infectious process.  Patient was given referral to hand surgery on-call.  Patient was discharged in stable condition.    FINAL IMPRESSION  1. Trigger ring finger of right hand         PLAN  1.  Appropriate discharge instructions given  2.  Follow-up with hand surgeon on call at [facility]  3.  Follow-up with occupational medicine    Electronically signed by: Guy G Gansert, 6/18/2019 12:27 PM

## 2019-06-18 NOTE — LETTER
[facility], EMERGENCY DEPT   [address], [place] [address]  Phone: Dept: [phone] - Fax:        [facility] Progress Report and Disability Certification  Date of Service: 6/18/2019   No Show:  No  Date / Time of Next Visit:     Claim Information   Patient Name: Kristina Davis  Claim Number:     Employer: [employer]  Date of Injury: 4/28/2019     Insurer / TPA: [facility] ID / SSN:    Occupation: [employer] Diagnosis: The encounter diagnosis was Trigger ring finger of right hand.    Medical Information   Related to Industrial Injury? Yes ***   Subjective Complaints:  Finger pain and swelling   Objective Findings: Findings consistent with trigger finger of right middle finger   Pre-Existing Condition(s):     Assessment:   Condition Same    Status: Additional Care Required  Permanent Disability:No    Plan:      Diagnostics:      Comments:       Disability Information   Status: Released to Full Duty    From:     Through:   Restrictions are:     Physical Restrictions   Sitting:    Standing:    Stooping:    Bending:      Squatting:    Walking:    Climbing:    Pushing:      Pulling:    Other:    Reaching Above Shoulder (L):   Reaching Above Shoulder (R):       Reaching Below Shoulder (L):    Reaching Below Shoulder (R):      Not to exceed Weight Limits   Carrying(hrs):   Weight Limit(lb):   Lifting(hrs):   Weight  Limit(lb):     Comments: 1.  Follow-up with hand surgery for evaluation    Repetitive Actions   Hands: i.e. Fine Manipulations from Grasping:     Feet: i.e. Operating Foot Controls:     Driving / Operate Machinery:     Physician Name: Gansert, Guy G Physician Signature: e-SignGANSERT, GUY G M.D. e-Signature:  , Medical Director   Clinic Name / Location: [facility], EMERGENCY DEPT  [address] [facility] [address]  [phone]     Clinic Phone Number: Dept: [phone]   Appointment Time:   Visit Start Time:    Check-In Time:  12:04 PM Visit Discharge Time:    Original-Treating Physician or Chiropractor    Page 2-Insurer/TPA    Page 3-Employer    Page 4-Employee

## 2019-12-29 ENCOUNTER — APPOINTMENT (OUTPATIENT)
Dept: RADIOLOGY | Facility: IMAGING CENTER | Age: 49
End: 2019-12-29
Attending: NURSE PRACTITIONER
Payer: MEDICAID

## 2019-12-29 ENCOUNTER — OFFICE VISIT (OUTPATIENT)
Dept: URGENT CARE | Facility: CLINIC | Age: 49
End: 2019-12-29
Payer: MEDICAID

## 2019-12-29 VITALS
HEART RATE: 84 BPM | SYSTOLIC BLOOD PRESSURE: 126 MMHG | DIASTOLIC BLOOD PRESSURE: 82 MMHG | TEMPERATURE: 99.2 F | OXYGEN SATURATION: 93 % | WEIGHT: 157 LBS | BODY MASS INDEX: 27.81 KG/M2 | RESPIRATION RATE: 18 BRPM

## 2019-12-29 DIAGNOSIS — H69.92 DYSFUNCTION OF LEFT EUSTACHIAN TUBE: ICD-10-CM

## 2019-12-29 DIAGNOSIS — M79.672 LEFT FOOT PAIN: ICD-10-CM

## 2019-12-29 DIAGNOSIS — H92.02 LEFT EAR PAIN: ICD-10-CM

## 2019-12-29 PROCEDURE — 73630 X-RAY EXAM OF FOOT: CPT | Mod: TC,LT | Performed by: EMERGENCY MEDICINE

## 2019-12-29 PROCEDURE — 99214 OFFICE O/P EST MOD 30 MIN: CPT | Performed by: NURSE PRACTITIONER

## 2019-12-29 RX ORDER — MECLIZINE HYDROCHLORIDE 25 MG/1
25 TABLET ORAL 3 TIMES DAILY PRN
Qty: 30 TAB | Refills: 0 | Status: SHIPPED | OUTPATIENT
Start: 2019-12-29

## 2019-12-29 ASSESSMENT — ENCOUNTER SYMPTOMS
VOMITING: 0
EYE PAIN: 0
CHILLS: 0
NUMBNESS: 0
FEVER: 0
DIARRHEA: 0
DIZZINESS: 0
NAUSEA: 0
MYALGIAS: 0
SORE THROAT: 0
RHINORRHEA: 0
SHORTNESS OF BREATH: 0
JOINT SWELLING: 0
WEAKNESS: 0
COUGH: 0

## 2019-12-30 NOTE — PROGRESS NOTES
Subjective:   Kristina Davis  is a 49 y.o. female who presents for Ankle Injury ((L) ankle-couple months -hurts to walk,(no injury)) and Earache ((L) ear,but pain in (R) ear-fluid and vertigo-x4 weeks on and off)        Foot Problem   This is a new problem. The current episode started more than 1 month ago (no injury). The problem occurs constantly. The problem has been unchanged. Pertinent negatives include no chest pain, chills, coughing, fever, joint swelling, myalgias, nausea, numbness, rash, sore throat, vomiting or weakness. Associated symptoms comments: Left foot pain  . The symptoms are aggravated by walking. She has tried nothing for the symptoms. The treatment provided no relief.   Otalgia    There is pain in the left ear. This is a new problem. The current episode started 1 to 4 weeks ago. The problem occurs constantly. The problem has been waxing and waning. There has been no fever. The pain is at a severity of 4/10. The pain is moderate. Pertinent negatives include no coughing, diarrhea, hearing loss, rash, rhinorrhea, sore throat or vomiting. Treatments tried: Meclizine, Sudafed, Flonase. The treatment provided no relief. There is no history of a chronic ear infection or hearing loss.   Patient requesting refill for meclizine  Review of Systems   Constitutional: Negative for chills and fever.   HENT: Positive for ear pain. Negative for hearing loss, rhinorrhea and sore throat.    Eyes: Negative for pain.   Respiratory: Negative for cough and shortness of breath.    Cardiovascular: Negative for chest pain.   Gastrointestinal: Negative for diarrhea, nausea and vomiting.   Genitourinary: Negative for hematuria.   Musculoskeletal: Positive for joint pain. Negative for joint swelling and myalgias.   Skin: Negative for rash.   Neurological: Negative for dizziness, weakness and numbness.     Allergies   Allergen Reactions   • Sulfa Drugs Hives     Causes angioderma and hives      Objective:   BP  126/82 (BP Location: Right arm)   Pulse 84   Temp 37.3 °C (99.2 °F) (Temporal)   Resp 18   Wt 71.2 kg (157 lb)   LMP 08/01/2013   SpO2 93%   BMI 27.81 kg/m²   Physical Exam  Vitals signs and nursing note reviewed.   Constitutional:       General: She is not in acute distress.     Appearance: She is well-developed.   HENT:      Head: Normocephalic and atraumatic.      Right Ear: Tympanic membrane and external ear normal.      Left Ear: Tympanic membrane and external ear normal.      Nose: Nose normal.      Right Sinus: No maxillary sinus tenderness or frontal sinus tenderness.      Left Sinus: No maxillary sinus tenderness or frontal sinus tenderness.      Mouth/Throat:      Mouth: Mucous membranes are moist.      Pharynx: Uvula midline. No posterior oropharyngeal erythema.      Tonsils: No tonsillar exudate or tonsillar abscesses.   Eyes:      General:         Right eye: No discharge.         Left eye: No discharge.      Conjunctiva/sclera: Conjunctivae normal.      Pupils: Pupils are equal, round, and reactive to light.   Cardiovascular:      Rate and Rhythm: Normal rate and regular rhythm.      Heart sounds: No murmur.   Pulmonary:      Effort: Pulmonary effort is normal. No respiratory distress.      Breath sounds: Normal breath sounds.   Abdominal:      General: There is no distension.      Palpations: Abdomen is soft.      Tenderness: There is no tenderness.   Musculoskeletal: Normal range of motion.      Left foot: Normal range of motion and normal capillary refill. Tenderness and bony tenderness present. No swelling.        Feet:    Skin:     General: Skin is warm and dry.   Neurological:      General: No focal deficit present.      Mental Status: She is alert and oriented to person, place, and time. Mental status is at baseline.      Gait: Gait (gait at baseline) normal.   Psychiatric:         Judgment: Judgment normal.           Assessment/Plan:     1. Left foot pain  DX-FOOT-COMPLETE 3+ LEFT   2.  Left ear pain  REFERRAL TO ENT    meclizine (ANTIVERT) 25 MG Tab   3. Dysfunction of left eustachian tube  REFERRAL TO ENT    meclizine (ANTIVERT) 25 MG Tab     Xray results  Unremarkable LEFT foot.      Advised to continue supportive care with Tylenol and/or ibuprofen for fevers and discomfort. Increased fluids and electrolytes.Encourage patient to continue taking meclizine, Flonase, Sudafed.  Will patient follow-up with ENT specialist for further evaluation and management of ongoing symptoms.  Patient has trialed an Ace bandage with minimal relief in symptoms.  Will trial a walking boot for ongoing left foot pain.  Recommended ice, elevation.  Patient given precautionary s/sx that mandate immediate follow up and evaluation in the ED. Advised of risks of not doing so.    DDX, Supportive care, and indications for immediate follow-up discussed with patient.    Instructed to return to clinic or nearest emergency department if we are not available for any change in condition, further concerns, or worsening of symptoms.    The patient demonstrated a good understanding and agreed with the treatment plan.

## 2022-08-25 ENCOUNTER — HOSPITAL ENCOUNTER (OUTPATIENT)
Facility: MEDICAL CENTER | Age: 52
End: 2022-08-25
Attending: NURSE PRACTITIONER
Payer: COMMERCIAL

## 2022-08-25 ENCOUNTER — OFFICE VISIT (OUTPATIENT)
Dept: URGENT CARE | Facility: PHYSICIAN GROUP | Age: 52
End: 2022-08-25

## 2022-08-25 VITALS
DIASTOLIC BLOOD PRESSURE: 78 MMHG | BODY MASS INDEX: 27.82 KG/M2 | TEMPERATURE: 97.5 F | SYSTOLIC BLOOD PRESSURE: 140 MMHG | HEIGHT: 63 IN | HEART RATE: 80 BPM | WEIGHT: 157 LBS

## 2022-08-25 DIAGNOSIS — Z02.89 ENCOUNTER FOR PHYSICAL EXAMINATION RELATED TO EMPLOYMENT: ICD-10-CM

## 2022-08-25 DIAGNOSIS — Z02.1 PRE-EMPLOYMENT DRUG SCREENING: ICD-10-CM

## 2022-08-25 DIAGNOSIS — Z02.89 ENCOUNTER FOR PHYSICAL EXAMINATION RELATED TO EMPLOYMENT: Primary | ICD-10-CM

## 2022-08-25 LAB
AMP AMPHETAMINE: NORMAL
COC COCAINE: NORMAL
INT CON NEG: NORMAL
INT CON POS: NORMAL
MET METHAMPHETAMINES: NORMAL
OPI OPIATES: NORMAL
PCP PHENCYCLIDINE: NORMAL
POC DRUG COMMENT 753798-OCCUPATIONAL HEALTH: NEGATIVE
THC: NORMAL

## 2022-08-25 PROCEDURE — 80305 DRUG TEST PRSMV DIR OPT OBS: CPT | Performed by: NURSE PRACTITIONER

## 2022-08-25 PROCEDURE — 8915 PR COMPREHENSIVE PHYSICAL: Performed by: NURSE PRACTITIONER

## 2022-08-26 LAB
GAMMA INTERFERON BACKGROUND BLD IA-ACNC: 0.03 IU/ML
M TB IFN-G BLD-IMP: NEGATIVE
M TB IFN-G CD4+ BCKGRND COR BLD-ACNC: -0.01 IU/ML
MITOGEN IGNF BCKGRD COR BLD-ACNC: >10 IU/ML
QFT TB2 - NIL TBQ2: -0.01 IU/ML

## 2022-08-31 PROBLEM — F32.A DEPRESSION: Status: ACTIVE | Noted: 2022-01-06

## 2022-08-31 PROBLEM — Z63.8 STRESS DUE TO FAMILY TENSION: Status: ACTIVE | Noted: 2019-12-06

## 2022-08-31 PROBLEM — F41.9 ANXIETY: Status: ACTIVE | Noted: 2019-12-06

## 2022-08-31 PROBLEM — H81.90 VERTIGINOUS SYNDROME: Status: ACTIVE | Noted: 2019-12-06

## 2022-08-31 RX ORDER — TRAZODONE HYDROCHLORIDE 150 MG/1
150 TABLET ORAL NIGHTLY
COMMUNITY
End: 2023-01-17 | Stop reason: SDUPTHER

## 2022-08-31 ASSESSMENT — ENCOUNTER SYMPTOMS: BACK PAIN: 1

## 2023-01-10 ENCOUNTER — TELEPHONE (OUTPATIENT)
Dept: SCHEDULING | Facility: IMAGING CENTER | Age: 53
End: 2023-01-10
Payer: MEDICAID

## 2023-01-15 SDOH — ECONOMIC STABILITY: TRANSPORTATION INSECURITY
IN THE PAST 12 MONTHS, HAS LACK OF RELIABLE TRANSPORTATION KEPT YOU FROM MEDICAL APPOINTMENTS, MEETINGS, WORK OR FROM GETTING THINGS NEEDED FOR DAILY LIVING?: NO

## 2023-01-15 SDOH — ECONOMIC STABILITY: TRANSPORTATION INSECURITY
IN THE PAST 12 MONTHS, HAS LACK OF TRANSPORTATION KEPT YOU FROM MEETINGS, WORK, OR FROM GETTING THINGS NEEDED FOR DAILY LIVING?: NO

## 2023-01-15 SDOH — ECONOMIC STABILITY: TRANSPORTATION INSECURITY
IN THE PAST 12 MONTHS, HAS THE LACK OF TRANSPORTATION KEPT YOU FROM MEDICAL APPOINTMENTS OR FROM GETTING MEDICATIONS?: NO

## 2023-01-15 SDOH — HEALTH STABILITY: PHYSICAL HEALTH: ON AVERAGE, HOW MANY MINUTES DO YOU ENGAGE IN EXERCISE AT THIS LEVEL?: 150+ MIN

## 2023-01-15 SDOH — ECONOMIC STABILITY: HOUSING INSECURITY
IN THE LAST 12 MONTHS, WAS THERE A TIME WHEN YOU DID NOT HAVE A STEADY PLACE TO SLEEP OR SLEPT IN A SHELTER (INCLUDING NOW)?: NO

## 2023-01-15 SDOH — ECONOMIC STABILITY: INCOME INSECURITY: IN THE LAST 12 MONTHS, WAS THERE A TIME WHEN YOU WERE NOT ABLE TO PAY THE MORTGAGE OR RENT ON TIME?: YES

## 2023-01-15 SDOH — ECONOMIC STABILITY: INCOME INSECURITY: HOW HARD IS IT FOR YOU TO PAY FOR THE VERY BASICS LIKE FOOD, HOUSING, MEDICAL CARE, AND HEATING?: HARD

## 2023-01-15 SDOH — ECONOMIC STABILITY: FOOD INSECURITY: WITHIN THE PAST 12 MONTHS, THE FOOD YOU BOUGHT JUST DIDN'T LAST AND YOU DIDN'T HAVE MONEY TO GET MORE.: NEVER TRUE

## 2023-01-15 SDOH — HEALTH STABILITY: PHYSICAL HEALTH: ON AVERAGE, HOW MANY DAYS PER WEEK DO YOU ENGAGE IN MODERATE TO STRENUOUS EXERCISE (LIKE A BRISK WALK)?: 3 DAYS

## 2023-01-15 SDOH — ECONOMIC STABILITY: HOUSING INSECURITY: IN THE LAST 12 MONTHS, HOW MANY PLACES HAVE YOU LIVED?: 3

## 2023-01-15 SDOH — ECONOMIC STABILITY: FOOD INSECURITY: WITHIN THE PAST 12 MONTHS, YOU WORRIED THAT YOUR FOOD WOULD RUN OUT BEFORE YOU GOT MONEY TO BUY MORE.: SOMETIMES TRUE

## 2023-01-15 SDOH — HEALTH STABILITY: MENTAL HEALTH
STRESS IS WHEN SOMEONE FEELS TENSE, NERVOUS, ANXIOUS, OR CAN'T SLEEP AT NIGHT BECAUSE THEIR MIND IS TROUBLED. HOW STRESSED ARE YOU?: RATHER MUCH

## 2023-01-15 SDOH — ECONOMIC STABILITY: HOUSING INSECURITY
IN THE LAST 12 MONTHS, WAS THERE A TIME WHEN YOU DID NOT HAVE A STEADY PLACE TO SLEEP OR SLEPT IN A SHELTER (INCLUDING NOW)?: YES

## 2023-01-15 ASSESSMENT — SOCIAL DETERMINANTS OF HEALTH (SDOH)
HOW OFTEN DO YOU HAVE SIX OR MORE DRINKS ON ONE OCCASION: NEVER
HOW OFTEN DO YOU ATTEND CHURCH OR RELIGIOUS SERVICES?: NEVER
HOW HARD IS IT FOR YOU TO PAY FOR THE VERY BASICS LIKE FOOD, HOUSING, MEDICAL CARE, AND HEATING?: HARD
HOW OFTEN DO YOU GET TOGETHER WITH FRIENDS OR RELATIVES?: NEVER
DO YOU BELONG TO ANY CLUBS OR ORGANIZATIONS SUCH AS CHURCH GROUPS UNIONS, FRATERNAL OR ATHLETIC GROUPS, OR SCHOOL GROUPS?: NO
HOW OFTEN DO YOU GET TOGETHER WITH FRIENDS OR RELATIVES?: NEVER
DO YOU BELONG TO ANY CLUBS OR ORGANIZATIONS SUCH AS CHURCH GROUPS UNIONS, FRATERNAL OR ATHLETIC GROUPS, OR SCHOOL GROUPS?: NO
IN A TYPICAL WEEK, HOW MANY TIMES DO YOU TALK ON THE PHONE WITH FAMILY, FRIENDS, OR NEIGHBORS?: ONCE A WEEK
IN A TYPICAL WEEK, HOW MANY TIMES DO YOU TALK ON THE PHONE WITH FAMILY, FRIENDS, OR NEIGHBORS?: ONCE A WEEK
HOW OFTEN DO YOU ATTEND CHURCH OR RELIGIOUS SERVICES?: NEVER
HOW OFTEN DO YOU ATTENT MEETINGS OF THE CLUB OR ORGANIZATION YOU BELONG TO?: NEVER
WITHIN THE PAST 12 MONTHS, YOU WORRIED THAT YOUR FOOD WOULD RUN OUT BEFORE YOU GOT THE MONEY TO BUY MORE: SOMETIMES TRUE
HOW OFTEN DO YOU ATTENT MEETINGS OF THE CLUB OR ORGANIZATION YOU BELONG TO?: NEVER
HOW MANY DRINKS CONTAINING ALCOHOL DO YOU HAVE ON A TYPICAL DAY WHEN YOU ARE DRINKING: 1 OR 2
HOW OFTEN DO YOU HAVE A DRINK CONTAINING ALCOHOL: MONTHLY OR LESS

## 2023-01-15 ASSESSMENT — LIFESTYLE VARIABLES
SKIP TO QUESTIONS 9-10: 1
HOW MANY STANDARD DRINKS CONTAINING ALCOHOL DO YOU HAVE ON A TYPICAL DAY: 1 OR 2
HOW OFTEN DO YOU HAVE SIX OR MORE DRINKS ON ONE OCCASION: NEVER
AUDIT-C TOTAL SCORE: 1
HOW OFTEN DO YOU HAVE A DRINK CONTAINING ALCOHOL: MONTHLY OR LESS

## 2023-01-17 ENCOUNTER — OFFICE VISIT (OUTPATIENT)
Dept: INTERNAL MEDICINE | Facility: OTHER | Age: 53
End: 2023-01-17
Payer: COMMERCIAL

## 2023-01-17 VITALS
HEIGHT: 63 IN | BODY MASS INDEX: 26.58 KG/M2 | HEART RATE: 88 BPM | SYSTOLIC BLOOD PRESSURE: 138 MMHG | DIASTOLIC BLOOD PRESSURE: 74 MMHG | OXYGEN SATURATION: 96 % | WEIGHT: 150 LBS | TEMPERATURE: 97.4 F

## 2023-01-17 DIAGNOSIS — G56.01 ACUTE CARPAL TUNNEL SYNDROME OF RIGHT WRIST: ICD-10-CM

## 2023-01-17 DIAGNOSIS — F15.10 METHAMPHETAMINE USE (HCC): ICD-10-CM

## 2023-01-17 DIAGNOSIS — Z76.89 ENCOUNTER TO ESTABLISH CARE WITH NEW DOCTOR: ICD-10-CM

## 2023-01-17 DIAGNOSIS — F41.9 ANXIETY: ICD-10-CM

## 2023-01-17 DIAGNOSIS — M54.41 CHRONIC BILATERAL LOW BACK PAIN WITH RIGHT-SIDED SCIATICA: ICD-10-CM

## 2023-01-17 DIAGNOSIS — E88.9 METABOLIC DISORDER: ICD-10-CM

## 2023-01-17 DIAGNOSIS — F31.9 BIPOLAR 1 DISORDER (HCC): ICD-10-CM

## 2023-01-17 DIAGNOSIS — D64.9 ANEMIA, UNSPECIFIED TYPE: ICD-10-CM

## 2023-01-17 DIAGNOSIS — G89.29 CHRONIC BILATERAL LOW BACK PAIN WITH RIGHT-SIDED SCIATICA: ICD-10-CM

## 2023-01-17 DIAGNOSIS — G47.00 INSOMNIA, UNSPECIFIED TYPE: ICD-10-CM

## 2023-01-17 DIAGNOSIS — F32.A DEPRESSION, UNSPECIFIED DEPRESSION TYPE: ICD-10-CM

## 2023-01-17 DIAGNOSIS — Z13.228 SCREENING FOR METABOLIC DISORDER: ICD-10-CM

## 2023-01-17 PROBLEM — M19.90 ARTHRITIS: Status: ACTIVE | Noted: 2023-01-17

## 2023-01-17 PROBLEM — M25.539 WRIST PAIN: Status: ACTIVE | Noted: 2023-01-17

## 2023-01-17 PROCEDURE — 99204 OFFICE O/P NEW MOD 45 MIN: CPT | Mod: GC | Performed by: STUDENT IN AN ORGANIZED HEALTH CARE EDUCATION/TRAINING PROGRAM

## 2023-01-17 RX ORDER — ARIPIPRAZOLE 10 MG/1
10 TABLET ORAL DAILY
COMMUNITY
End: 2023-01-17 | Stop reason: SDUPTHER

## 2023-01-17 RX ORDER — TRAZODONE HYDROCHLORIDE 150 MG/1
150 TABLET ORAL NIGHTLY
Qty: 30 TABLET | Refills: 1 | Status: SHIPPED | OUTPATIENT
Start: 2023-01-17

## 2023-01-17 RX ORDER — BUPROPION HYDROCHLORIDE 100 MG/1
100 TABLET ORAL 2 TIMES DAILY
COMMUNITY
End: 2023-01-17

## 2023-01-17 RX ORDER — CYCLOBENZAPRINE HCL 10 MG
10 TABLET ORAL 3 TIMES DAILY PRN
COMMUNITY
End: 2023-01-17 | Stop reason: SDUPTHER

## 2023-01-17 RX ORDER — METHOCARBAMOL 500 MG/1
500 TABLET, FILM COATED ORAL 2 TIMES DAILY PRN
COMMUNITY
End: 2023-01-17

## 2023-01-17 RX ORDER — BUPROPION HYDROCHLORIDE 150 MG/1
150 TABLET, EXTENDED RELEASE ORAL 2 TIMES DAILY
COMMUNITY
End: 2023-01-17 | Stop reason: SDUPTHER

## 2023-01-17 RX ORDER — METHOCARBAMOL 500 MG/1
1000 TABLET, FILM COATED ORAL 4 TIMES DAILY
COMMUNITY

## 2023-01-17 RX ORDER — BUPROPION HYDROCHLORIDE 150 MG/1
150 TABLET, EXTENDED RELEASE ORAL 2 TIMES DAILY
Qty: 60 TABLET | Refills: 1 | Status: SHIPPED | OUTPATIENT
Start: 2023-01-17

## 2023-01-17 RX ORDER — CYCLOBENZAPRINE HCL 10 MG
10 TABLET ORAL 3 TIMES DAILY PRN
Qty: 30 TABLET | Refills: 1 | Status: SHIPPED | OUTPATIENT
Start: 2023-01-17

## 2023-01-17 RX ORDER — ARIPIPRAZOLE 10 MG/1
10 TABLET ORAL DAILY
Qty: 30 TABLET | Refills: 1 | Status: SHIPPED | OUTPATIENT
Start: 2023-01-17

## 2023-01-17 RX ORDER — TRAMADOL HYDROCHLORIDE 50 MG/1
50 TABLET ORAL 2 TIMES DAILY PRN
COMMUNITY
End: 2023-01-17

## 2023-01-17 NOTE — LETTER
January 17, 2023    To Whom It May Concern:         This is confirmation that Kristina Celaya ArunRicky attended her scheduled appointment with Nakul Rubalcava M.D. on 1/17/23.  Patient has medical condition which would qualify her for light duty, and lifting no more than 10 pounds with her right arm.         If you have any questions please do not hesitate to call me at the phone number listed below.    Sincerely,          Nakul Rubalcava M.D.  [phone]

## 2023-01-17 NOTE — LETTER
January 17, 2023    To Whom It May Concern:         This is confirmation that Kristina Celaya ArunRicky attended her scheduled appointment with Nakul Rubalcava M.D. on 1/17/23.  Patient required time off on 1/17/2023 for medical condition.  Patient should also be on light duty at work, trying to lift no more than 10 pounds with her right arm.         If you have any questions please do not hesitate to call me at the phone number listed below.    Sincerely,          Nakul Rubalcava M.D.  [phone]

## 2023-01-18 NOTE — PATIENT INSTRUCTIONS
Thank you for visiting today!  Please follow-up in 5 weeks  Please follow-up on referrals and schedule an appointment with pain management for carpal tunnel treatment and chronic back pain treatment, psychiatry and psychology (counselor).  If you do not hear back on how to schedule your appointment from us in 7-10 business days please let us now.  Consider restarting Narcotics Anonymous  Please let us know if your mood changes you ever feel depressed or like you might hurt somebody else or yourself and starting a new medication.  Please let us know if you feel like you are having any side effects going back on your medication.  Please get lab work done at least 5 days before next visit.  Please try and eat healthy, get at least 30 minutes of cardiovascular exercise a day to help keep your health as best as it can be.  If you have any questions or concerns please feel free to contact us at [phone].  If you feel like you are experiencing a medical emergency please seek immediate medical attention at an urgent care or in the emergency department.

## 2023-02-09 ENCOUNTER — PATIENT MESSAGE (OUTPATIENT)
Dept: INTERNAL MEDICINE | Facility: OTHER | Age: 53
End: 2023-02-09
Payer: MEDICAID

## 2023-12-04 ENCOUNTER — OFFICE VISIT (OUTPATIENT)
Dept: URGENT CARE | Facility: PHYSICIAN GROUP | Age: 53
End: 2023-12-04
Payer: MEDICAID

## 2023-12-04 VITALS
SYSTOLIC BLOOD PRESSURE: 142 MMHG | BODY MASS INDEX: 27.82 KG/M2 | OXYGEN SATURATION: 97 % | HEART RATE: 84 BPM | DIASTOLIC BLOOD PRESSURE: 66 MMHG | TEMPERATURE: 96.7 F | HEIGHT: 63 IN | WEIGHT: 157 LBS | RESPIRATION RATE: 14 BRPM

## 2023-12-04 DIAGNOSIS — J01.90 ACUTE NON-RECURRENT SINUSITIS, UNSPECIFIED LOCATION: ICD-10-CM

## 2023-12-04 DIAGNOSIS — J02.9 SORE THROAT: ICD-10-CM

## 2023-12-04 DIAGNOSIS — R03.0 ELEVATED BLOOD PRESSURE READING: ICD-10-CM

## 2023-12-04 LAB
FLUAV RNA SPEC QL NAA+PROBE: NEGATIVE
FLUBV RNA SPEC QL NAA+PROBE: NEGATIVE
RSV RNA SPEC QL NAA+PROBE: NEGATIVE
S PYO DNA SPEC NAA+PROBE: NOT DETECTED
SARS-COV-2 RNA RESP QL NAA+PROBE: NEGATIVE

## 2023-12-04 PROCEDURE — 87637 SARSCOV2&INF A&B&RSV AMP PRB: CPT | Mod: QW | Performed by: PHYSICIAN ASSISTANT

## 2023-12-04 PROCEDURE — 87651 STREP A DNA AMP PROBE: CPT | Performed by: PHYSICIAN ASSISTANT

## 2023-12-04 PROCEDURE — 3078F DIAST BP <80 MM HG: CPT | Performed by: PHYSICIAN ASSISTANT

## 2023-12-04 PROCEDURE — 99214 OFFICE O/P EST MOD 30 MIN: CPT | Performed by: PHYSICIAN ASSISTANT

## 2023-12-04 PROCEDURE — 3077F SYST BP >= 140 MM HG: CPT | Performed by: PHYSICIAN ASSISTANT

## 2023-12-04 RX ORDER — PREDNISONE 20 MG/1
TABLET ORAL
COMMUNITY
Start: 2023-12-01

## 2023-12-04 RX ORDER — VARENICLINE TARTRATE 0.5 (11)-1
KIT ORAL
COMMUNITY

## 2023-12-04 RX ORDER — DOXYCYCLINE HYCLATE 100 MG/1
CAPSULE ORAL
COMMUNITY
Start: 2023-10-25 | End: 2023-12-04

## 2023-12-04 RX ORDER — OMEPRAZOLE 40 MG/1
40 CAPSULE, DELAYED RELEASE ORAL DAILY
COMMUNITY

## 2023-12-04 RX ORDER — DEXAMETHASONE 4 MG/1
TABLET ORAL
COMMUNITY

## 2023-12-04 RX ORDER — BENZONATATE 200 MG/1
CAPSULE ORAL
COMMUNITY
Start: 2023-10-25

## 2023-12-04 RX ORDER — AZITHROMYCIN 250 MG/1
TABLET, FILM COATED ORAL
COMMUNITY
Start: 2023-12-01

## 2023-12-04 RX ORDER — QUETIAPINE FUMARATE 50 MG/1
50 TABLET, FILM COATED ORAL DAILY
COMMUNITY

## 2023-12-04 RX ORDER — GABAPENTIN 300 MG/1
300 CAPSULE ORAL 3 TIMES DAILY
COMMUNITY
Start: 2023-09-08

## 2023-12-04 RX ORDER — IBUPROFEN 600 MG/1
TABLET ORAL
COMMUNITY
Start: 2023-09-08

## 2023-12-04 RX ORDER — ALBUTEROL SULFATE 90 UG/1
AEROSOL, METERED RESPIRATORY (INHALATION)
COMMUNITY
Start: 2023-10-03

## 2023-12-04 RX ORDER — ARIPIPRAZOLE 15 MG/1
TABLET ORAL
COMMUNITY

## 2023-12-04 RX ORDER — VARENICLINE TARTRATE 1 MG/1
1 TABLET, FILM COATED ORAL 2 TIMES DAILY
COMMUNITY
Start: 2023-12-01

## 2023-12-04 RX ORDER — AMOXICILLIN AND CLAVULANATE POTASSIUM 875; 125 MG/1; MG/1
1 TABLET, FILM COATED ORAL 2 TIMES DAILY
Qty: 14 TABLET | Refills: 0 | Status: SHIPPED | OUTPATIENT
Start: 2023-12-04 | End: 2023-12-11

## 2023-12-04 RX ORDER — DEXTROMETHORPHAN HYDROBROMIDE AND PROMETHAZINE HYDROCHLORIDE 15; 6.25 MG/5ML; MG/5ML
SYRUP ORAL
COMMUNITY
Start: 2023-10-25

## 2023-12-04 ASSESSMENT — COPD QUESTIONNAIRES: COPD: 0

## 2023-12-04 ASSESSMENT — ENCOUNTER SYMPTOMS
SORE THROAT: 1
CHILLS: 0
SHORTNESS OF BREATH: 0
WHEEZING: 0
VOMITING: 0
COUGH: 1
HEMOPTYSIS: 0
MYALGIAS: 1
HEADACHES: 1
FEVER: 0
RHINORRHEA: 1

## 2023-12-04 NOTE — LETTER
[facility] [address] [address]     December 4, 2023    Patient: Kristina Davis   YOB: 1970   Date of Visit: 12/4/2023       To Whom It May Concern:    Kristina Davsi was seen and treated in our department on 12/4/2023.  Please excuse her from work on 12/4 through 12/6/2023.    Sincerely,     Tony Lester P.A.-C.

## 2023-12-04 NOTE — PROGRESS NOTES
Subjective:   Kristina Davis is a 53 y.o. female who presents for Otalgia and Cough        Otalgia   There is pain in the right ear. This is a new problem. Episode onset: 2 weeks. The problem has been unchanged. Associated symptoms include coughing, headaches, rhinorrhea and a sore throat. Pertinent negatives include no vomiting. Treatments tried: prednisone, azithromycin. The treatment provided no relief.   Cough  This is a new problem. Episode onset: 3 days. The problem has been gradually worsening. The problem occurs constantly. The cough is Productive of sputum. Associated symptoms include ear pain, headaches, myalgias, nasal congestion (sinus pressure  x 2 weeks), rhinorrhea and a sore throat. Pertinent negatives include no chills, fever, hemoptysis, shortness of breath or wheezing. Treatments tried: prednisone, azithromycin x 2days. The treatment provided no relief. There is no history of asthma, COPD or pneumonia.     Review of Systems   Constitutional:  Negative for chills and fever.   HENT:  Positive for ear pain, rhinorrhea and sore throat.    Respiratory:  Positive for cough. Negative for hemoptysis, shortness of breath and wheezing.    Gastrointestinal:  Negative for vomiting.   Musculoskeletal:  Positive for myalgias.   Neurological:  Positive for headaches.       PMH:  has a past medical history of Backache (S/P L4-5), Bipolar I disorder, most recent episode (or current) unspecified, Cough, Displacement of lumbar intervertebral disc without myelopathy, Dizziness and giddiness, Irregular menstrual cycle, Lumbar pain ( MRI   9/2008), Need for Td vaccine ( needed), Osteoarthrosis involving, or with mention of more than one site, but not specified as generalized, multiple sites, Pap smear ( 1990), Pneumonia (hx.), Screening mammogram ( 1998), and Tobacco use disorder.  MEDS:   Current Outpatient Medications:     azithromycin (ZITHROMAX) 250 MG Tab, TAKE 2 TABLETS BY MOUTH ON DAY 1, AND THEN TAKE 1  TABLET BY MOUTH ONCE A DAY ON DAY 2 THROUGH DAY 5, Disp: , Rfl:     FLOVENT  MCG/ACT Aerosol, INHALE 2 PUFFS BY MOUTH 2 TIMES A DAY. RINSE MOUTH AND THROAT AFTER USE. USE WITH SPACER CHAMBER, Disp: , Rfl:     gabapentin (NEURONTIN) 300 MG Cap, Take 300 mg by mouth 3 times a day., Disp: , Rfl:     ibuprofen (MOTRIN) 600 MG Tab, TAKE 1 TABLET BY MOUTH EVERY 6 HOURS FOR 14 DAYS, Disp: , Rfl:     omeprazole (PRILOSEC) 40 MG delayed-release capsule, Take 40 mg by mouth every day. FOR 90 DAYS, Disp: , Rfl:     predniSONE (DELTASONE) 20 MG Tab, TAKE 1 TABLET BY MOUTH TWICE DAILY FOR 5 DAYS, Disp: , Rfl:     varenicline (CHANTIX) 1 MG tablet, Take 1 mg by mouth 2 times a day., Disp: , Rfl:     ARIPiprazole (ABILIFY) 15 MG Tab, , Disp: , Rfl:     amoxicillin-clavulanate (AUGMENTIN) 875-125 MG Tab, Take 1 Tablet by mouth 2 times a day for 7 days., Disp: 14 Tablet, Rfl: 0    methocarbamol (ROBAXIN) 500 MG Tab, Take 1,000 mg by mouth 4 times a day., Disp: , Rfl:     buPROPion SR (WELLBUTRIN SR) 150 MG TABLET SR 12 HR sustained-release tablet, Take 1 Tablet by mouth 2 times a day., Disp: 60 Tablet, Rfl: 1    cyclobenzaprine (FLEXERIL) 10 mg Tab, Take 1 Tablet by mouth 3 times a day as needed for Muscle Spasms., Disp: 30 Tablet, Rfl: 1    traZODone (DESYREL) 150 MG Tab, Take 1 Tablet by mouth every evening., Disp: 30 Tablet, Rfl: 1    meclizine (ANTIVERT) 25 MG Tab, Take 1 Tab by mouth 3 times a day as needed for Vertigo., Disp: 30 Tab, Rfl: 0    VENTOLIN  (90 Base) MCG/ACT Aero Soln inhalation aerosol, INHALE 1 PUFF BY MOUTH ONCE DAILY AS NEEDED FOR WHEEZING (Patient not taking: Reported on 12/4/2023), Disp: , Rfl:     benzonatate (TESSALON) 200 MG capsule, TAKE 1 CAPSULE BY MOUTH THREE TIMES DAILY FOR 10 DAYS AS NEEDED FOR COUGH. DO NOT CRUSH OR CHEW (Patient not taking: Reported on 12/4/2023), Disp: , Rfl:     promethazine-dextromethorphan (PROMETHAZINE-DM) 6.25-15 MG/5ML syrup, TAKE 10 ML BY MOUTH EVERY DAY AT  "BEDTIME AS NEEDED FOR COUGH FOR 10 DAYS (Patient not taking: Reported on 12/4/2023), Disp: , Rfl:     QUEtiapine (SEROQUEL) 50 MG tablet, Take 50 mg by mouth every day. (Patient not taking: Reported on 12/4/2023), Disp: , Rfl:     Varenicline Tartrate, Starter, 0.5 MG X 11 & 1 MG X 42 Tablet Therapy Pack, TAKE 1 TABLET BY MOUTH USE AS DIRECTED ON PACKAGE, Disp: , Rfl:     ARIPiprazole (ABILIFY) 10 MG Tab, Take 1 Tablet by mouth every day. (Patient not taking: Reported on 12/4/2023), Disp: 30 Tablet, Rfl: 1  ALLERGIES:   Allergies   Allergen Reactions    Sulfa Drugs Hives and Anaphylaxis     Causes angioderma and hives     SURGHX:   Past Surgical History:   Procedure Laterality Date    LUMBAR LAMINECTOMY DISKECTOMY  3/18/2009    Performed by RIP GAMA at SURGERY [facility]    TUBAL COAGULATION LAPAROSCOPIC BILATERAL       SOCHX:  reports that she has been smoking cigarettes. She has a 7.5 pack-year smoking history. She has never used smokeless tobacco. She reports current alcohol use. She reports that she does not use drugs.  FH: Family history was reviewed, no pertinent findings to report   Objective:   BP (!) 142/66   Pulse 84   Temp 35.9 °C (96.7 °F) (Temporal)   Resp 14   Ht 1.6 m (5' 3\")   Wt 71.2 kg (157 lb)   LMP 08/01/2013   SpO2 97%   BMI 27.81 kg/m²   Physical Exam  Vitals reviewed.   Constitutional:       General: She is not in acute distress.     Appearance: Normal appearance. She is well-developed. She is not toxic-appearing.   HENT:      Head: Normocephalic and atraumatic.      Right Ear: Ear canal and external ear normal. A middle ear effusion is present.      Left Ear: Ear canal and external ear normal. A middle ear effusion is present.      Nose: Congestion present.      Mouth/Throat:      Lips: Pink.      Mouth: Mucous membranes are moist.      Pharynx: Oropharynx is clear. Uvula midline. Posterior oropharyngeal erythema present.      Tonsils: No tonsillar exudate. "   Cardiovascular:      Rate and Rhythm: Normal rate and regular rhythm.      Heart sounds: Normal heart sounds, S1 normal and S2 normal.   Pulmonary:      Effort: Pulmonary effort is normal. No respiratory distress.      Breath sounds: Normal breath sounds. No stridor. No decreased breath sounds, wheezing, rhonchi or rales.   Skin:     General: Skin is dry.   Neurological:      Comments: Alert and oriented.    Psychiatric:         Speech: Speech normal.         Behavior: Behavior normal.           Assessment/Plan:   1. Sore throat  - POCT GROUP A STREP, PCR  - POCT CoV-2, Flu A/B, RSV by PCR    2. Elevated blood pressure reading    3. Acute non-recurrent sinusitis, unspecified location  - amoxicillin-clavulanate (AUGMENTIN) 875-125 MG Tab; Take 1 Tablet by mouth 2 times a day for 7 days.  Dispense: 14 Tablet; Refill: 0    Other orders  - VENTOLIN  (90 Base) MCG/ACT Aero Soln inhalation aerosol; INHALE 1 PUFF BY MOUTH ONCE DAILY AS NEEDED FOR WHEEZING (Patient not taking: Reported on 12/4/2023)  - azithromycin (ZITHROMAX) 250 MG Tab; TAKE 2 TABLETS BY MOUTH ON DAY 1, AND THEN TAKE 1 TABLET BY MOUTH ONCE A DAY ON DAY 2 THROUGH DAY 5  - benzonatate (TESSALON) 200 MG capsule; TAKE 1 CAPSULE BY MOUTH THREE TIMES DAILY FOR 10 DAYS AS NEEDED FOR COUGH. DO NOT CRUSH OR CHEW (Patient not taking: Reported on 12/4/2023)  - FLOVENT  MCG/ACT Aerosol; INHALE 2 PUFFS BY MOUTH 2 TIMES A DAY. RINSE MOUTH AND THROAT AFTER USE. USE WITH SPACER CHAMBER  - gabapentin (NEURONTIN) 300 MG Cap; Take 300 mg by mouth 3 times a day.  - ibuprofen (MOTRIN) 600 MG Tab; TAKE 1 TABLET BY MOUTH EVERY 6 HOURS FOR 14 DAYS  - omeprazole (PRILOSEC) 40 MG delayed-release capsule; Take 40 mg by mouth every day. FOR 90 DAYS  - predniSONE (DELTASONE) 20 MG Tab; TAKE 1 TABLET BY MOUTH TWICE DAILY FOR 5 DAYS  - promethazine-dextromethorphan (PROMETHAZINE-DM) 6.25-15 MG/5ML syrup; TAKE 10 ML BY MOUTH EVERY DAY AT BEDTIME AS NEEDED FOR COUGH FOR  10 DAYS (Patient not taking: Reported on 12/4/2023)  - QUEtiapine (SEROQUEL) 50 MG tablet; Take 50 mg by mouth every day. (Patient not taking: Reported on 12/4/2023)  - varenicline (CHANTIX) 1 MG tablet; Take 1 mg by mouth 2 times a day.  - Varenicline Tartrate, Starter, 0.5 MG X 11 & 1 MG X 42 Tablet Therapy Pack; TAKE 1 TABLET BY MOUTH USE AS DIRECTED ON PACKAGE  - ARIPiprazole (ABILIFY) 15 MG Tab    No evidence of otitis media on exam today.  Low clinical suspicion for pneumonia, but consideration discussed.  Exam today consistent with sinusitis.  Patient may continue prednisone.  She may finish the full course of the azithromycin but we will also add Augmentin.    Recommend taking these with food to avoid GI upset.  Concurrent use of probiotic may also be beneficial.    Flonase 1 squirt in each nostril, as instructed in clinic, once a day.  Use nasal saline TID to promote drainage.   Salt water gurgles to soothe sore throat.  Ayr saline gel to moisturize nasal passage and prevent bleeding.  Try to use sudafed sparingly in order to allow sinuses to drain.  Avoid the longer formulations and try to take only in the morning and/or at noon if needed.  If you fail to improve in 3-5 days or symptoms worsen/new symptoms develop, RTC for reevaluation    2.  Blood pressure reading mildly elevated today.  Recommend that she continue to monitor and follow-up with PCP as scheduled.

## 2023-12-30 NOTE — PROGRESS NOTES
Kristina Davis is a 52 y.o. female who presents today with the following:    Kristina Davis is a 52 y.o. female who presents today with the following:    CC: Establish Care with Primary Care Physician     VLADIMIR:  Ms. Davis is a very pleasant 52-year-old female with a complex past medical history including bipolar 1 disorder, depression, anxiety, vertiginous syndrome?,  Tobacco use, methamphetamine use, chronic back pain secondary to displacement of lumbar intervertebral disc without myelopathy, osteoarthritis who presents today to establish care back here in [place] after not seeing a physician for greater than 3 months and being out of her medications for again approximately 3 months.  Patient recently moved back from [place], where she was taking care of her mother who is undergoing hospice for multiple myeloma, mood has been better within the last week, patient denies any SI or HI any acute janelle or manic symptoms, unfortunately given her somewhat depressed mood patient decided to use methamphetamine again after a hiatus of many years, use last use approximately 10 days ago.  Denies any other drug use, has also been trying to cut down on tobacco use from half pack a day now down to 3 or 4 cigarettes/day.  Not ready to quit at this time.  Otherwise patient wishes to reestablish with psychiatry, psychology, get back on her medication regimen of bupropion and omeprazole as well as trazodone for sleep and reestablish with the pain management doctor.  In addition to this patient has had an acute on chronic worsening of right wrist pain, on the lateral aspect of the wrist extending to the dorsum of the thumb and first finger that has not been alleviated.  Patient states that the wrist pain is a sharp ache, worse with rotation and lifting objects.  Patient does not use keyboard type regularly.  Has not had in the past.  Does have some radiation up to elbow.  Previous history of right middle finger  release.  Approximately 8-month in duration, worsening over the last month, over the last 3-1/2 weeks patient has had strong brace to apply to risk which initially helped some, but unfortunately she is now unable to sleep and perform all of her work duties comfortably given the pain.  Has tried Tylenol, soaking in neck some salt, Aleve, and even placing her TENS unit on the area to no avail.  Patient denies any fevers, chills, night sweats, chest pain, palpitations, shortness of breath, cough, abdominal pain, nausea/vomiting/diarrhea/constipation, new onset musculoskeletal aches or pains other than HPI, focal weakness or dizziness.      ROS:       General: No fevers, chills, night sweats, weight loss or gain  HEENT: No hearing changes, vision changes, eye pain, ear pain, nasal discharge, sore throat  Neck: No swelling in neck  Pulmonary: No shortness of breath, cough, sputum, or hemoptysis  Cardiovascular: No chest pain, palpitations, or LE swelling  GI: No nausea, vomiting, diarrhea, constipation, abdominal pain, hematochezia or melena  : No dysuria or frequency  Neuro: No focal weakness, no general weakness, no headaches, no lightheadedness, no dizziness  Psych: No anxiety or depression    Past Medical History:   Diagnosis Date    Backache S/P L4-5    decompressive laminectomies with bilateral foramionotomies    Bipolar I disorder, most recent episode (or current) unspecified     untreated      Cough     Displacement of lumbar intervertebral disc without myelopathy     right sided sciatica    Dizziness and giddiness     Irregular menstrual cycle     menorrhagia lifelong    Lumbar pain  MRI   9/2008    MRI (9/2008) Left lateral disk protusion L5-S1 with left S1 root compression,small central disk  protusion at the L4-L5 with effacement of the ventral sac and small radial tear of the annulus as well as mild right lateral disk bulge at L4-L5 with neuro foraminal stenosis.    Need for Td vaccine  needed     Osteoarthrosis involving, or with mention of more than one site, but not specified as generalized, multiple sites     Pap smear      Pneumonia hx.    Screening mammogram      Tobacco use disorder        Past Surgical History:   Procedure Laterality Date    LUMBAR LAMINECTOMY DISKECTOMY  3/18/2009    Performed by RIP GAMA at [facility]    TUBAL COAGULATION LAPAROSCOPIC BILATERAL         Family History   Problem Relation Age of Onset    Cancer Mother     Diabetes Maternal Grandmother     Cancer Maternal Grandmother      MM     Social History     Tobacco Use    Smoking status: Every Day     Packs/day: 0.25     Years: 30.00     Pack years: 7.50     Types: Cigarettes    Smokeless tobacco: Never   Vaping Use    Vaping Use: Never used   Substance Use Topics    Alcohol use: Yes     Comment: ocassional    Drug use: No     Comment: none currently but in the past did.     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   Diet: Healthy     Tobacco use:  ETOH: 1-2 drinks 1-2 times per year on special occasions   Recreational drug use, methamphetamine 10 days ago, MJ in the past.    Current Outpatient Medications   Medication Sig Dispense Refill    buPROPion (WELLBUTRIN) 100 MG Tab Take 100 mg by mouth 2 times a day.      ARIPiprazole (ABILIFY) 10 MG Tab Take 10 mg by mouth every day.      methocarbamol (ROBAXIN) 500 MG Tab Take 1,000 mg by mouth 4 times a day.      cyclobenzaprine (FLEXERIL) 10 mg Tab Take 10 mg by mouth 3 times a day as needed.      traZODone (DESYREL) 150 MG Tab Take 150 mg by mouth every evening.      meclizine (ANTIVERT) 25 MG Tab Take 1 Tab by mouth 3 times a day as needed for Vertigo. 30 Tab 0    TRAZODONE HCL PO Take 150 mg by mouth.       No current facility-administered  "medications for this visit.             Physical Exam:  /74 (BP Location: Left arm, Patient Position: Sitting, BP Cuff Size: Adult)   Pulse 88   Temp 36.3 °C (97.4 °F) (Temporal)   Ht 1.6 m (5' 3\")   Wt 68 kg (150 lb)   LMP 08/01/2013   SpO2 96%   BMI 26.57 kg/m²   General: Well developed, well nourished female, in no distress.  HEENT: NC/AT, PERRL, EOMI, no scleral icterus or conjunctival pallor, fair dentition/denture in, no nasal discharge or oral erythema or exudates.   Neck: Supple, No cervical or supraclavicular LAD  CV:RRR, no murmurs gallops or Rubs, no JVD  Pulm: LCAB, no crackles, rales, rhonchi, or wheezing  GI: Normal bowel sounds, abdomen soft, nontender, nondistended to deep or light palpation in all 4 quadrants, no HSM.  MSK: Radial and dorsalis pedis pulses 2+ and equal bilaterally, respectively.  Strength 5 out of 5 in upper and lower extremities.  No lower extremity edema, no right arm extremity, and upper extremities equal, no obvious deformity.  Neuro: Patient is alert and oriented x3, no focal deficits  Psych: Appropriate mood and affect       Assessment and Plan:   1. Encounter to establish care with new doctor  Patient presents today to establish care, past medical history reviewed with records currently available, complete records not available from [place] where the patient has lived from the last several years.  Updated patient chart.  - Admit to patient panel    2. Methamphetamine use (HCC)  Patient with history of methamphetamine use heavily in early adulthood, states she was sober during her time in [place], and usually only uses methamphetamine when she is off of her medications and feels uneven.  Last use approximately 10 days ago, when at the time she was feeling lower.  Does not appear to be  - Counseled patient on cessation  - Informed patient on health side effects of using methamphetamine  - Encourage sobriety  - Encourage Narcotics Anonymous which patient has been to " previously.    3. Metabolic disorder  -Screen for metabolic orders as below.    4. Bipolar 1 disorder (HCC)  Patient with a long history of bipolar 1 disorder previously seen by psychology and psychiatry in [facility], has been off of all medications for approximately 3 months, denying any current highs or lows, however with current substance use, concerning, patient denies any previous side effects to medications, given reasonable starting doses will restart in the meantime as referrals are processed for further management by psychology and psychiatry respectively.  - Referral to Psychology  - Referral to Psychiatry  - ARIPiprazole (ABILIFY) 10 MG Tab; Take 1 Tablet by mouth every day.  Dispense: 30 Tablet; Refill: 1  - buPROPion SR (WELLBUTRIN SR) 150 MG TABLET SR 12 HR sustained-release tablet; Take 1 Tablet by mouth 2 times a day.  Dispense: 60 Tablet; Refill: 1    5. Acute carpal tunnel syndrome of right wrist  Pain, along the median  distribution of the right arm consistent with carpal tunnel, unrelieved by brace, positive Tinel sign.  -Continue conservative management with brace  -Poor candidate for p.o. steroids  - Discussed with patient referral to pain management for possible injection, release or ultrasonography.  -Provided work note.  - Referral to Pain Management    6. Anxiety  Patient has a history of anxiety, appears well controlled today, without any recent anxiety attacks, wishing for referral back to psychiatry and psychology.  Not on any long-acting benzos.  - Referral to Psychology  - Referral to Psychiatry  - buPROPion SR (WELLBUTRIN SR) 150 MG TABLET SR 12 HR sustained-release tablet; Take 1 Tablet by mouth 2 times a day.  Dispense: 60 Tablet; Refill: 1    7. Depression, unspecified depression type  Patient denies any SI or HI currently, has a history of depression related to bipolar disorder.  - Referral to Psychology  - Referral to Psychiatry  - buPROPion SR (WELLBUTRIN SR) 150 MG TABLET SR  12 HR sustained-release tablet; Take 1 Tablet by mouth 2 times a day.  Dispense: 60 Tablet; Refill: 1    8. Anemia, unspecified type  History of anemia, patient does not know why.  We will work-up with CBC to see if still present, next work-up depending on micro versus macrocytic B12, TSH, folate, or iron panel.  - CBC WITH DIFFERENTIAL; Future    9. Chronic bilateral low back pain with right-sided sciatica  History of laminectomy, chronic back pain, degenerative disc disease without myelopathy.  Previously seen by pain management in [facility] records not currently available.  - Referral to Pain Management  - cyclobenzaprine (FLEXERIL) 10 mg Tab; Take 1 Tablet by mouth 3 times a day as needed for Muscle Spasms.  Dispense: 30 Tablet; Refill: 1    10. Screening for metabolic disorder  Screening for metabolic disorder as below.  - CBC WITH DIFFERENTIAL; Future  - Comp Metabolic Panel; Future  - Lipid Profile; Future  - TSH WITH REFLEX TO FT4; Future  - HEMOGLOBIN A1C; Future    11. Insomnia, unspecified type  Will need addressed at future visit.  - traZODone (DESYREL) 150 MG Tab; Take 1 Tablet by mouth every evening.  Dispense: 30 Tablet; Refill: 1      Follow up: Laboratory follow-up, healthcare maintenance, medication follow-up, will eventually need follow-up for vertiginous disorder, screening for hep C.      .health    Colon July 2020 hopes    Light duty note  PAP smear 2017  Mammo gram     Return in about 5 weeks (around 2/21/2023) for Long.    Patient Instructions   Thank you for visiting today!  Please follow-up in 5 weeks  Please follow-up on referrals and schedule an appointment with pain management for carpal tunnel treatment and chronic back pain treatment, psychiatry and psychology (counselor).  If you do not hear back on how to schedule your appointment from us in 7-10 business days please let us now.  Consider restarting Narcotics Anonymous  Please let us know if your mood changes you ever feel depressed  or like you might hurt somebody else or yourself and starting a new medication.  Please let us know if you feel like you are having any side effects going back on your medication.  Please get lab work done at least 5 days before next visit.  Please try and eat healthy, get at least 30 minutes of cardiovascular exercise a day to help keep your health as best as it can be.  If you have any questions or concerns please feel free to contact us at [phone].  If you feel like you are experiencing a medical emergency please seek immediate medical attention at an urgent care or in the emergency department.       Nakul Rubalcava M.D. PGY 2  [facility]    This note was created using voice recognition software.  While every attempt is made to ensure accuracy of transcription, occasionally errors occur.          on the discharge service for the patient. I have reviewed and made amendments to the documentation where necessary.

## 2024-04-10 ENCOUNTER — OFFICE VISIT (OUTPATIENT)
Dept: URGENT CARE | Facility: PHYSICIAN GROUP | Age: 54
End: 2024-04-10
Payer: MEDICAID

## 2024-04-10 VITALS
OXYGEN SATURATION: 98 % | RESPIRATION RATE: 16 BRPM | HEIGHT: 63 IN | BODY MASS INDEX: 27.29 KG/M2 | TEMPERATURE: 97.7 F | WEIGHT: 154 LBS | DIASTOLIC BLOOD PRESSURE: 76 MMHG | HEART RATE: 102 BPM | SYSTOLIC BLOOD PRESSURE: 148 MMHG

## 2024-04-10 DIAGNOSIS — F41.9 ANXIETY: ICD-10-CM

## 2024-04-10 DIAGNOSIS — I10 ESSENTIAL HYPERTENSION: ICD-10-CM

## 2024-04-10 PROCEDURE — 99214 OFFICE O/P EST MOD 30 MIN: CPT | Performed by: NURSE PRACTITIONER

## 2024-04-10 PROCEDURE — 3077F SYST BP >= 140 MM HG: CPT | Performed by: NURSE PRACTITIONER

## 2024-04-10 PROCEDURE — 3078F DIAST BP <80 MM HG: CPT | Performed by: NURSE PRACTITIONER

## 2024-04-10 RX ORDER — LISINOPRIL 20 MG/1
20 TABLET ORAL DAILY
Qty: 30 TABLET | Refills: 0 | Status: SHIPPED | OUTPATIENT
Start: 2024-04-10 | End: 2024-05-10

## 2024-04-10 RX ORDER — PROPRANOLOL HYDROCHLORIDE 10 MG/1
10 TABLET ORAL 3 TIMES DAILY
Qty: 90 TABLET | Refills: 11 | Status: SHIPPED | OUTPATIENT
Start: 2024-04-10

## 2024-04-10 ASSESSMENT — ENCOUNTER SYMPTOMS
NERVOUS/ANXIOUS: 1
DIZZINESS: 0
HEADACHES: 1

## 2024-04-10 NOTE — LETTER
April 10, 2024    To Whom It May Concern:         This is confirmation that Kristina Belia Susan attended her scheduled appointment with ALONA Seth on 4/10/24. Please excuse her absence starting 4/9/2024. She may return to work on 4/15/2024 or sooner if better.          If you have any questions please do not hesitate to call me at the phone number listed below.    Sincerely,          Sandra Culp A.P.R.N.  [phone]

## 2024-04-10 NOTE — PROGRESS NOTES
-currently on: Pepcid and sucralfate  -daughter indicates patient has been eating more meals during nighttime, has noticed exacerbation of symptoms  -has followed with GI in the past, now on PRN basis  -recommended to follow up with GI since symptoms have returned  -encouraged adherence to medications as well as avoid irritants, specially at night Subjective:     Kristina Davis is a 53 y.o. female who presents for Headache (X 2 days with high BP. Hx of Anxiety. )      Headache    Pt presents for evaluation of a new problem. Kristina is a very pleasant 53-year-old female presents to urgent care today with complaints of anxiety/stress secondary to her work environment.  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  She states that she is not having anxiety on her way to work and has episodes of extremely high blood pressure when at work.  She has been monitoring her blood pressure for the past 2 weeks with readings as high as 200 yesterday she did have to leave work early as her heart was racing and her blood pressure was extremely high.  She is concerned that if she continues with this anxiety and high blood pressure reading she is going to have a heart attack.  No current headache or feelings of anxiety.  Patient was originally on metoprolol however, was taken off this medication by an ER physician.  She does have a follow-up appointment scheduled with a new PCP    Review of Systems   Neurological:  Positive for headaches. Negative for dizziness.   Psychiatric/Behavioral:  The patient is nervous/anxious.        PMH:   Past Medical History:   Diagnosis Date    Backache S/P L4-5    decompressive laminectomies with bilateral foramionotomies    Bipolar I disorder, most recent episode (or current) unspecified     untreated      Cough     Displacement of lumbar intervertebral disc without myelopathy     right sided sciatica    Dizziness and giddiness     Irregular menstrual cycle     menorrhagia lifelong    Lumbar pain  MRI   9/2008    MRI (9/2008) Left lateral disk protusion L5-S1 with left S1 root compression,small central disk  protusion at the L4-L5 with effacement of the ventral sac and small radial tear of the annulus as well as mild right lateral disk bulge at L4-L5 with neuro foraminal  stenosis.    Need for Td vaccine  needed    Osteoarthrosis involving, or with mention of more than one site, but not specified as generalized, multiple sites     Pap smear  1990    Pneumonia hx.    Screening mammogram  1998    Tobacco use disorder      ALLERGIES:   Allergies   Allergen Reactions    Sulfa Drugs Hives and Anaphylaxis     Causes angioderma and hives     SURGHX:   Past Surgical History:   Procedure Laterality Date    LUMBAR LAMINECTOMY DISKECTOMY  3/18/2009    Performed by RIP GAMA at [facility]    TUBAL COAGULATION LAPAROSCOPIC BILATERAL       SOCHX:   Social History     Socioeconomic History    Marital status: Other    Highest education level: Some college, no degree   Tobacco Use    Smoking status: Every Day     Current packs/day: 0.25     Average packs/day: 0.3 packs/day for 30.0 years (7.5 ttl pk-yrs)     Types: Cigarettes    Smokeless tobacco: Never   Vaping Use    Vaping Use: Never used   Substance and Sexual Activity    Alcohol use: Yes     Comment: ocassional    Drug use: No     Comment: none currently but in the past did.    Sexual activity: Not Currently     Comment:      Social Determinants of Health     Financial Resource Strain: High Risk (1/15/2023)    Overall Financial Resource Strain (CARDIA)     Difficulty of Paying Living Expenses: Hard   Food Insecurity: Food Insecurity Present (1/15/2023)    Hunger Vital Sign     Worried About Running Out of Food in the Last Year: Sometimes true     Ran Out of Food in the Last Year: Never true   Transportation Needs: No Transportation Needs (1/15/2023)    PRAPARE - Transportation     Lack of Transportation (Medical): No     Lack of Transportation (Non-Medical): No   Physical Activity: Sufficiently Active (1/15/2023)    Exercise Vital Sign     Days of Exercise per Week: 3 days     Minutes of Exercise per Session: 150+ min   Stress: Stress Concern Present (1/15/2023)    [employer] [place] [employer] -  "Occupational Stress Questionnaire     Feeling of Stress : Rather much   Social Connections: Socially Isolated (1/15/2023)    Social Connection and Isolation Panel [NHANES]     Frequency of Communication with Friends and Family: Once a week     Frequency of Social Gatherings with Friends and Family: Never     Attends Anabaptism Services: Never     Active Member of Clubs or Organizations: No     Attends Club or Organization Meetings: Never     Marital Status:    Housing Stability: High Risk (1/15/2023)    Housing Stability Vital Sign     Unable to Pay for Housing in the Last Year: Yes     Number of Places Lived in the Last Year: 3     Unstable Housing in the Last Year: No     FH:   Family History   Problem Relation Age of Onset    Cancer Mother     Diabetes Maternal Grandmother     Cancer Maternal Grandmother          Objective:   BP (!) 148/76   Pulse (!) 102   Temp 36.5 °C (97.7 °F) (Temporal)   Resp 16   Ht 1.6 m (5' 3\")   Wt 69.9 kg (154 lb)   LMP 08/01/2013   SpO2 98%   BMI 27.28 kg/m²     Physical Exam  Vitals and nursing note reviewed.   Constitutional:       General: She is not in acute distress.     Appearance: Normal appearance. She is normal weight. She is not ill-appearing or toxic-appearing.   HENT:      Head: Normocephalic.      Right Ear: External ear normal.      Left Ear: External ear normal.      Nose: No congestion or rhinorrhea.      Mouth/Throat:      Pharynx: No oropharyngeal exudate or posterior oropharyngeal erythema.   Eyes:      General:         Right eye: No discharge.         Left eye: No discharge.      Pupils: Pupils are equal, round, and reactive to light.   Cardiovascular:      Rate and Rhythm: Normal rate and regular rhythm.   Pulmonary:      Effort: Pulmonary effort is normal.   Abdominal:      General: Abdomen is flat.   Musculoskeletal:         General: Normal range of motion.      Cervical back: Normal range of motion and neck supple.   Skin:     General: Skin is " dry.   Neurological:      General: No focal deficit present.      Mental Status: She is alert and oriented to person, place, and time. Mental status is at baseline.   Psychiatric:         Mood and Affect: Mood normal.         Behavior: Behavior normal.         Thought Content: Thought content normal.         Judgment: Judgment normal.         Assessment/Plan:   Assessment      1. Essential hypertension  lisinopril (PRINIVIL) 20 MG Tab      2. Anxiety  propranolol (INDERAL) 10 MG Tab    Referral to Behavioral Health        Patient was started on lisinopril for treatment of essential hypertension and propranolol as needed for breakthrough anxiety attacks she was referred also to follow-up with behavioral health for therapeutic services.  She is in agreement with her plan of care today.

## 2024-07-02 ENCOUNTER — OFFICE VISIT (OUTPATIENT)
Dept: URGENT CARE | Facility: PHYSICIAN GROUP | Age: 54
End: 2024-07-02
Payer: MEDICAID

## 2024-07-02 VITALS
RESPIRATION RATE: 18 BRPM | HEIGHT: 63 IN | OXYGEN SATURATION: 97 % | SYSTOLIC BLOOD PRESSURE: 126 MMHG | WEIGHT: 153 LBS | HEART RATE: 97 BPM | BODY MASS INDEX: 27.11 KG/M2 | DIASTOLIC BLOOD PRESSURE: 68 MMHG | TEMPERATURE: 96.4 F

## 2024-07-02 DIAGNOSIS — R11.2 NAUSEA AND VOMITING, UNSPECIFIED VOMITING TYPE: ICD-10-CM

## 2024-07-02 DIAGNOSIS — U07.1 COVID-19: ICD-10-CM

## 2024-07-02 LAB
FLUAV RNA SPEC QL NAA+PROBE: NEGATIVE
FLUBV RNA SPEC QL NAA+PROBE: NEGATIVE
RSV RNA SPEC QL NAA+PROBE: NEGATIVE
SARS-COV-2 RNA RESP QL NAA+PROBE: POSITIVE

## 2024-07-02 PROCEDURE — 99214 OFFICE O/P EST MOD 30 MIN: CPT | Performed by: FAMILY MEDICINE

## 2024-07-02 PROCEDURE — 3078F DIAST BP <80 MM HG: CPT | Performed by: FAMILY MEDICINE

## 2024-07-02 PROCEDURE — 87637 SARSCOV2&INF A&B&RSV AMP PRB: CPT | Mod: QW | Performed by: FAMILY MEDICINE

## 2024-07-02 PROCEDURE — 3074F SYST BP LT 130 MM HG: CPT | Performed by: FAMILY MEDICINE

## 2024-07-02 RX ORDER — MOLNUPIRAVIR 200 MG/1
800 CAPSULE ORAL 2 TIMES DAILY
Qty: 40 CAPSULE | Refills: 0 | Status: SHIPPED | OUTPATIENT
Start: 2024-07-02 | End: 2024-07-07

## 2024-07-02 RX ORDER — BENZONATATE 200 MG/1
200 CAPSULE ORAL 3 TIMES DAILY PRN
Qty: 45 CAPSULE | Refills: 0 | Status: SHIPPED | OUTPATIENT
Start: 2024-07-02

## 2024-07-02 RX ORDER — ONDANSETRON 4 MG/1
4 TABLET, ORALLY DISINTEGRATING ORAL EVERY 8 HOURS PRN
Qty: 10 TABLET | Refills: 0 | Status: SHIPPED | OUTPATIENT
Start: 2024-07-02

## 2024-07-05 ENCOUNTER — TELEPHONE (OUTPATIENT)
Dept: URGENT CARE | Facility: PHYSICIAN GROUP | Age: 54
End: 2024-07-05
Payer: MEDICAID

## 2024-07-05 DIAGNOSIS — R05.1 ACUTE COUGH: ICD-10-CM

## 2024-07-05 RX ORDER — ONDANSETRON 4 MG/1
4 TABLET, ORALLY DISINTEGRATING ORAL EVERY 8 HOURS PRN
Qty: 10 TABLET | Refills: 0 | Status: SHIPPED | OUTPATIENT
Start: 2024-07-05

## 2024-07-06 ENCOUNTER — OFFICE VISIT (OUTPATIENT)
Dept: URGENT CARE | Facility: PHYSICIAN GROUP | Age: 54
End: 2024-07-06
Payer: MEDICAID

## 2024-07-06 ENCOUNTER — APPOINTMENT (OUTPATIENT)
Dept: RADIOLOGY | Facility: IMAGING CENTER | Age: 54
End: 2024-07-06
Payer: MEDICAID

## 2024-07-06 VITALS
WEIGHT: 152.4 LBS | OXYGEN SATURATION: 97 % | BODY MASS INDEX: 27 KG/M2 | DIASTOLIC BLOOD PRESSURE: 80 MMHG | TEMPERATURE: 97.7 F | HEART RATE: 70 BPM | SYSTOLIC BLOOD PRESSURE: 130 MMHG | RESPIRATION RATE: 18 BRPM | HEIGHT: 63 IN

## 2024-07-06 DIAGNOSIS — R05.1 ACUTE COUGH: ICD-10-CM

## 2024-07-06 DIAGNOSIS — R06.02 SHORTNESS OF BREATH: ICD-10-CM

## 2024-07-06 DIAGNOSIS — U07.1 COVID-19: ICD-10-CM

## 2024-07-06 PROCEDURE — 3079F DIAST BP 80-89 MM HG: CPT

## 2024-07-06 PROCEDURE — 3075F SYST BP GE 130 - 139MM HG: CPT

## 2024-07-06 PROCEDURE — 71046 X-RAY EXAM CHEST 2 VIEWS: CPT | Mod: TC,FY

## 2024-07-06 PROCEDURE — 99213 OFFICE O/P EST LOW 20 MIN: CPT

## 2024-07-06 RX ORDER — ALBUTEROL SULFATE 90 UG/1
2 AEROSOL, METERED RESPIRATORY (INHALATION) EVERY 6 HOURS PRN
Qty: 8.5 G | Refills: 0 | Status: SHIPPED | OUTPATIENT
Start: 2024-07-06

## 2024-07-06 RX ORDER — DEXTROMETHORPHAN HYDROBROMIDE AND PROMETHAZINE HYDROCHLORIDE 15; 6.25 MG/5ML; MG/5ML
5 SYRUP ORAL EVERY 6 HOURS PRN
Qty: 100 ML | Refills: 0 | Status: SHIPPED | OUTPATIENT
Start: 2024-07-06

## 2024-07-06 ASSESSMENT — ENCOUNTER SYMPTOMS
HEADACHES: 1
COUGH: 1
SPUTUM PRODUCTION: 1
FEVER: 1
SHORTNESS OF BREATH: 1

## 2024-07-07 ENCOUNTER — APPOINTMENT (OUTPATIENT)
Dept: TELEHEALTH | Facility: TELEMEDICINE | Age: 54
End: 2024-07-07
Payer: MEDICAID

## 2024-07-08 ENCOUNTER — OFFICE VISIT (OUTPATIENT)
Dept: URGENT CARE | Facility: PHYSICIAN GROUP | Age: 54
End: 2024-07-08
Payer: MEDICAID

## 2024-07-08 ENCOUNTER — APPOINTMENT (OUTPATIENT)
Dept: TELEHEALTH | Facility: TELEMEDICINE | Age: 54
End: 2024-07-08
Payer: MEDICAID

## 2024-07-08 VITALS
WEIGHT: 151 LBS | DIASTOLIC BLOOD PRESSURE: 84 MMHG | BODY MASS INDEX: 26.75 KG/M2 | HEIGHT: 63 IN | RESPIRATION RATE: 16 BRPM | HEART RATE: 85 BPM | OXYGEN SATURATION: 98 % | SYSTOLIC BLOOD PRESSURE: 138 MMHG | TEMPERATURE: 96.9 F

## 2024-07-08 DIAGNOSIS — U07.1 COVID-19: ICD-10-CM

## 2024-07-08 PROCEDURE — 99213 OFFICE O/P EST LOW 20 MIN: CPT | Performed by: PHYSICIAN ASSISTANT

## 2024-07-08 PROCEDURE — 3079F DIAST BP 80-89 MM HG: CPT | Performed by: PHYSICIAN ASSISTANT

## 2024-07-08 PROCEDURE — 3075F SYST BP GE 130 - 139MM HG: CPT | Performed by: PHYSICIAN ASSISTANT

## 2024-07-08 ASSESSMENT — ENCOUNTER SYMPTOMS
ABDOMINAL PAIN: 0
DIARRHEA: 0
PALPITATIONS: 0
FEVER: 0
MYALGIAS: 0
SINUS PAIN: 0
SORE THROAT: 0
CHILLS: 0
SHORTNESS OF BREATH: 0
DIAPHORESIS: 0
VOMITING: 0
WHEEZING: 0
SPUTUM PRODUCTION: 0
HEADACHES: 0
DIZZINESS: 0
NAUSEA: 1
COUGH: 1

## 2024-07-18 ENCOUNTER — OFFICE VISIT (OUTPATIENT)
Dept: URGENT CARE | Facility: PHYSICIAN GROUP | Age: 54
End: 2024-07-18
Payer: MEDICAID

## 2024-07-18 ENCOUNTER — HOSPITAL ENCOUNTER (OUTPATIENT)
Dept: LAB | Facility: MEDICAL CENTER | Age: 54
End: 2024-07-18
Attending: NURSE PRACTITIONER
Payer: MEDICAID

## 2024-07-18 ENCOUNTER — APPOINTMENT (OUTPATIENT)
Dept: RADIOLOGY | Facility: IMAGING CENTER | Age: 54
End: 2024-07-18
Attending: NURSE PRACTITIONER
Payer: MEDICAID

## 2024-07-18 VITALS
HEIGHT: 63 IN | TEMPERATURE: 97.4 F | WEIGHT: 154.8 LBS | SYSTOLIC BLOOD PRESSURE: 150 MMHG | HEART RATE: 87 BPM | OXYGEN SATURATION: 97 % | DIASTOLIC BLOOD PRESSURE: 84 MMHG | RESPIRATION RATE: 20 BRPM | BODY MASS INDEX: 27.43 KG/M2

## 2024-07-18 DIAGNOSIS — U07.1 COVID-19: Primary | ICD-10-CM

## 2024-07-18 DIAGNOSIS — R05.3 PERSISTENT COUGH FOR 3 WEEKS OR LONGER: ICD-10-CM

## 2024-07-18 DIAGNOSIS — R19.7 DIARRHEA, UNSPECIFIED TYPE: ICD-10-CM

## 2024-07-18 DIAGNOSIS — R11.0 NAUSEA: ICD-10-CM

## 2024-07-18 DIAGNOSIS — R06.02 SHORTNESS OF BREATH: ICD-10-CM

## 2024-07-18 DIAGNOSIS — U07.1 COVID-19: ICD-10-CM

## 2024-07-18 LAB
ALBUMIN SERPL BCP-MCNC: 4.3 G/DL (ref 3.2–4.9)
ALBUMIN/GLOB SERPL: 1.4 G/DL
ALP SERPL-CCNC: 114 U/L (ref 30–99)
ALT SERPL-CCNC: 24 U/L (ref 2–50)
ANION GAP SERPL CALC-SCNC: 15 MMOL/L (ref 7–16)
AST SERPL-CCNC: 23 U/L (ref 12–45)
BILIRUB SERPL-MCNC: 0.2 MG/DL (ref 0.1–1.5)
BUN SERPL-MCNC: 10 MG/DL (ref 8–22)
CALCIUM ALBUM COR SERPL-MCNC: 8.9 MG/DL (ref 8.5–10.5)
CALCIUM SERPL-MCNC: 9.1 MG/DL (ref 8.5–10.5)
CHLORIDE SERPL-SCNC: 104 MMOL/L (ref 96–112)
CO2 SERPL-SCNC: 22 MMOL/L (ref 20–33)
CREAT SERPL-MCNC: 0.7 MG/DL (ref 0.5–1.4)
GFR SERPLBLD CREATININE-BSD FMLA CKD-EPI: 101 ML/MIN/1.73 M 2
GLOBULIN SER CALC-MCNC: 3.1 G/DL (ref 1.9–3.5)
GLUCOSE SERPL-MCNC: 100 MG/DL (ref 65–99)
LIPASE SERPL-CCNC: 35 U/L (ref 11–82)
POTASSIUM SERPL-SCNC: 4.8 MMOL/L (ref 3.6–5.5)
PROT SERPL-MCNC: 7.4 G/DL (ref 6–8.2)
SODIUM SERPL-SCNC: 141 MMOL/L (ref 135–145)

## 2024-07-18 PROCEDURE — 99214 OFFICE O/P EST MOD 30 MIN: CPT | Performed by: NURSE PRACTITIONER

## 2024-07-18 PROCEDURE — 36415 COLL VENOUS BLD VENIPUNCTURE: CPT

## 2024-07-18 PROCEDURE — 80053 COMPREHEN METABOLIC PANEL: CPT

## 2024-07-18 PROCEDURE — 83690 ASSAY OF LIPASE: CPT

## 2024-07-18 PROCEDURE — 71046 X-RAY EXAM CHEST 2 VIEWS: CPT | Mod: TC,FY | Performed by: FAMILY MEDICINE

## 2024-07-18 PROCEDURE — 3077F SYST BP >= 140 MM HG: CPT | Performed by: NURSE PRACTITIONER

## 2024-07-18 PROCEDURE — 3079F DIAST BP 80-89 MM HG: CPT | Performed by: NURSE PRACTITIONER

## 2024-07-18 PROCEDURE — 85025 COMPLETE CBC W/AUTO DIFF WBC: CPT

## 2024-07-18 RX ORDER — PREDNISONE 20 MG/1
40 TABLET ORAL DAILY
Qty: 10 TABLET | Refills: 0 | Status: SHIPPED | OUTPATIENT
Start: 2024-07-18 | End: 2024-07-23

## 2024-07-18 RX ORDER — HYDROXYZINE HYDROCHLORIDE 25 MG/1
TABLET, FILM COATED ORAL
COMMUNITY
Start: 2024-06-07

## 2024-07-18 RX ORDER — DEXTROMETHORPHAN HYDROBROMIDE AND PROMETHAZINE HYDROCHLORIDE 15; 6.25 MG/5ML; MG/5ML
5 SYRUP ORAL EVERY 4 HOURS PRN
Qty: 120 ML | Refills: 0 | Status: SHIPPED | OUTPATIENT
Start: 2024-07-18 | End: 2024-07-25

## 2024-07-18 RX ORDER — VORTIOXETINE 10 MG/1
TABLET, FILM COATED ORAL
COMMUNITY
Start: 2024-06-16

## 2024-07-18 RX ORDER — LOSARTAN POTASSIUM 50 MG/1
50 TABLET ORAL DAILY
COMMUNITY
Start: 2024-06-21

## 2024-07-18 RX ORDER — ONDANSETRON 4 MG/1
4 TABLET, ORALLY DISINTEGRATING ORAL EVERY 6 HOURS PRN
Qty: 10 TABLET | Refills: 0 | Status: SHIPPED | OUTPATIENT
Start: 2024-07-18 | End: 2024-07-23

## 2024-07-18 RX ORDER — CLONAZEPAM 0.5 MG/1
TABLET ORAL
COMMUNITY
Start: 2024-04-24

## 2024-07-18 RX ORDER — LUMATEPERONE 21 MG/1
CAPSULE ORAL
COMMUNITY
Start: 2024-06-16

## 2024-07-18 ASSESSMENT — ENCOUNTER SYMPTOMS: NAUSEA: 1

## 2024-07-19 LAB
BASOPHILS # BLD AUTO: 0.8 % (ref 0–1.8)
BASOPHILS # BLD: 0.05 K/UL (ref 0–0.12)
EOSINOPHIL # BLD AUTO: 0.23 K/UL (ref 0–0.51)
EOSINOPHIL NFR BLD: 3.6 % (ref 0–6.9)
ERYTHROCYTE [DISTWIDTH] IN BLOOD BY AUTOMATED COUNT: 43.5 FL (ref 35.9–50)
HCT VFR BLD AUTO: 43 % (ref 37–47)
HGB BLD-MCNC: 14.7 G/DL (ref 12–16)
IMM GRANULOCYTES # BLD AUTO: 0.03 K/UL (ref 0–0.11)
IMM GRANULOCYTES NFR BLD AUTO: 0.5 % (ref 0–0.9)
LYMPHOCYTES # BLD AUTO: 2.19 K/UL (ref 1–4.8)
LYMPHOCYTES NFR BLD: 34.1 % (ref 22–41)
MCH RBC QN AUTO: 30.8 PG (ref 27–33)
MCHC RBC AUTO-ENTMCNC: 34.2 G/DL (ref 32.2–35.5)
MCV RBC AUTO: 90.1 FL (ref 81.4–97.8)
MONOCYTES # BLD AUTO: 0.52 K/UL (ref 0–0.85)
MONOCYTES NFR BLD AUTO: 8.1 % (ref 0–13.4)
NEUTROPHILS # BLD AUTO: 3.4 K/UL (ref 1.82–7.42)
NEUTROPHILS NFR BLD: 52.9 % (ref 44–72)
NRBC # BLD AUTO: 0 K/UL
NRBC BLD-RTO: 0 /100 WBC (ref 0–0.2)
PLATELET # BLD AUTO: 287 K/UL (ref 164–446)
PMV BLD AUTO: 10.8 FL (ref 9–12.9)
RBC # BLD AUTO: 4.77 M/UL (ref 4.2–5.4)
WBC # BLD AUTO: 6.4 K/UL (ref 4.8–10.8)

## 2024-07-22 ENCOUNTER — OFFICE VISIT (OUTPATIENT)
Dept: URGENT CARE | Facility: PHYSICIAN GROUP | Age: 54
End: 2024-07-22
Payer: MEDICAID

## 2024-07-22 VITALS
OXYGEN SATURATION: 96 % | TEMPERATURE: 96.9 F | WEIGHT: 150 LBS | HEART RATE: 96 BPM | DIASTOLIC BLOOD PRESSURE: 78 MMHG | HEIGHT: 63 IN | RESPIRATION RATE: 16 BRPM | SYSTOLIC BLOOD PRESSURE: 122 MMHG | BODY MASS INDEX: 26.58 KG/M2

## 2024-07-22 DIAGNOSIS — L03.119 CELLULITIS OF LOWER EXTREMITY, UNSPECIFIED LATERALITY: ICD-10-CM

## 2024-07-22 DIAGNOSIS — L73.9 FOLLICULITIS: ICD-10-CM

## 2024-07-22 PROCEDURE — 3074F SYST BP LT 130 MM HG: CPT | Performed by: FAMILY MEDICINE

## 2024-07-22 PROCEDURE — 3078F DIAST BP <80 MM HG: CPT | Performed by: FAMILY MEDICINE

## 2024-07-22 PROCEDURE — 99213 OFFICE O/P EST LOW 20 MIN: CPT | Performed by: FAMILY MEDICINE

## 2024-07-22 RX ORDER — CLINDAMYCIN HYDROCHLORIDE 300 MG/1
300 CAPSULE ORAL 3 TIMES DAILY
Qty: 15 CAPSULE | Refills: 0 | Status: SHIPPED | OUTPATIENT
Start: 2024-07-22 | End: 2024-07-27

## 2024-07-22 ASSESSMENT — ENCOUNTER SYMPTOMS
DIZZINESS: 0
FEVER: 0
COUGH: 0
CHILLS: 0
VOMITING: 0
SORE THROAT: 0
NAUSEA: 0
MYALGIAS: 0
SHORTNESS OF BREATH: 0

## 2024-07-22 ASSESSMENT — FIBROSIS 4 INDEX: FIB4 SCORE: 0.92

## 2024-08-07 ENCOUNTER — OFFICE VISIT (OUTPATIENT)
Dept: URGENT CARE | Facility: PHYSICIAN GROUP | Age: 54
End: 2024-08-07
Payer: MEDICAID

## 2024-08-07 VITALS
DIASTOLIC BLOOD PRESSURE: 82 MMHG | OXYGEN SATURATION: 98 % | HEIGHT: 63 IN | TEMPERATURE: 96.7 F | HEART RATE: 85 BPM | RESPIRATION RATE: 16 BRPM | SYSTOLIC BLOOD PRESSURE: 128 MMHG | BODY MASS INDEX: 26.86 KG/M2 | WEIGHT: 151.6 LBS

## 2024-08-07 DIAGNOSIS — H65.03 NON-RECURRENT ACUTE SEROUS OTITIS MEDIA OF BOTH EARS: ICD-10-CM

## 2024-08-07 PROCEDURE — 3074F SYST BP LT 130 MM HG: CPT

## 2024-08-07 PROCEDURE — 3079F DIAST BP 80-89 MM HG: CPT

## 2024-08-07 PROCEDURE — 99213 OFFICE O/P EST LOW 20 MIN: CPT

## 2024-08-07 RX ORDER — METFORMIN HCL 500 MG
500 TABLET, EXTENDED RELEASE 24 HR ORAL DAILY
COMMUNITY

## 2024-08-07 RX ORDER — FLUTICASONE PROPIONATE 50 MCG
2 SPRAY, SUSPENSION (ML) NASAL
Qty: 16 G | Refills: 1 | Status: SHIPPED | OUTPATIENT
Start: 2024-08-07

## 2024-08-07 ASSESSMENT — ENCOUNTER SYMPTOMS
CHILLS: 0
SHORTNESS OF BREATH: 0
ABDOMINAL PAIN: 0
HEADACHES: 1
FEVER: 0
COUGH: 0
NAUSEA: 0
VOMITING: 0
SORE THROAT: 0

## 2024-08-07 ASSESSMENT — FIBROSIS 4 INDEX: FIB4 SCORE: 0.92

## 2024-08-07 NOTE — PROGRESS NOTES
Chief Complaint   Patient presents with    Headache    Otalgia     Bilateral ear pain x 1 wk       HISTORY OF PRESENT ILLNESS: Patient is a pleasant 54 y.o. female who presents to urgent care today bilateral ear pain for one week.  Noted nasal congestion.  No noted fevers.  Patient does smoke, denies cough.      Patient Active Problem List    Diagnosis Date Noted    Anemia 01/17/2023    Arthritis 01/17/2023    Wrist pain 01/17/2023    Methamphetamine use (HCC) 01/17/2023    Depression 01/06/2022    Anxiety 12/06/2019    Stress due to family tension 12/06/2019    Vertiginous syndrome 12/06/2019    Encounter for Papanicolaou smear for cervical cancer screening 09/27/2017    Encounter for immunization 09/27/2017    Hot flashes due to menopause 09/27/2017    Chronic back pain 08/23/2017    Peripheral neuropathy 08/23/2017    Dizziness and giddiness 09/11/2009    Cough 09/11/2009    Tobacco use disorder 09/11/2009    Bipolar 1 disorder (HCC) 09/11/2009    Osteoarthritis of multiple joints 09/11/2009    Chronic lower back pain 08/09/2007       Allergies:Sulfa drugs    Current Outpatient Medications Ordered in Epic   Medication Sig Dispense Refill    metFORMIN ER (GLUCOPHAGE XR) 500 MG TABLET SR 24 HR Take 500 mg by mouth every day.      amoxicillin-clavulanate (AUGMENTIN) 875-125 MG Tab Take 1 Tablet by mouth 2 times a day for 5 days. 10 Tablet 0    fluticasone (FLONASE) 50 MCG/ACT nasal spray Administer 2 Sprays into affected nostril(S) at bedtime. 16 g 1    losartan (COZAAR) 50 MG Tab Take 50 mg by mouth every day.      CAPLYTA 21 MG Cap TAKE 1 CAPSULE BY MOUTH ONCE DAILY AT BEDTIME FOR 30 DAYS FOR BIPOLAR.      TRINTELLIX 10 MG tablet TAKE 1 TABLET BY MOUTH ONCE DAILY AT BEDTIME FOR DEPRESSION      hydrOXYzine HCl (ATARAX) 25 MG Tab TAKE 1 TABLET BY MOUTH THREE TIMES DAILY FOR 20 DAYS AS NEEDED FOR ANXIETY      clonazePAM (KLONOPIN) 0.5 MG Tab TAKE 1 TABLET BY MOUTH EVERY DAY FOR 15 DAYS AS NEEDED FOR PANIC ATTACKS       albuterol 108 (90 Base) MCG/ACT Aero Soln inhalation aerosol Inhale 2 Puffs every 6 hours as needed for Shortness of Breath. 8.5 g 0    FLOVENT  MCG/ACT Aerosol INHALE 2 PUFFS BY MOUTH 2 TIMES A DAY. RINSE MOUTH AND THROAT AFTER USE. USE WITH SPACER CHAMBER      omeprazole (PRILOSEC) 40 MG delayed-release capsule Take 40 mg by mouth every day. FOR 90 DAYS      buPROPion SR (WELLBUTRIN SR) 150 MG TABLET SR 12 HR sustained-release tablet Take 1 Tablet by mouth 2 times a day. (Patient taking differently: Take 300 mg by mouth 2 times a day.) 60 Tablet 1    mupirocin (BACTROBAN) 2 % Ointment Apply 1 Application topically 2 times a day. (Patient not taking: Reported on 8/7/2024) 22 g 0    promethazine-dextromethorphan (PROMETHAZINE-DM) 6.25-15 MG/5ML syrup Take 5 mL by mouth every 6 hours as needed for Cough for up to 20 doses. (Patient not taking: Reported on 8/7/2024) 100 mL 0    ondansetron (ZOFRAN ODT) 4 MG TABLET DISPERSIBLE Take 1 Tablet by mouth every 8 hours as needed for Nausea/Vomiting. 10 Tablet 0    ondansetron (ZOFRAN ODT) 4 MG TABLET DISPERSIBLE Take 1 Tablet by mouth every 8 hours as needed for Nausea/Vomiting. (Patient not taking: Reported on 7/8/2024) 10 Tablet 0    ondansetron (ZOFRAN ODT) 4 MG TABLET DISPERSIBLE Take 1 Tablet by mouth every 8 hours as needed for Nausea/Vomiting. (Patient not taking: Reported on 7/18/2024) 10 Tablet 0    benzonatate (TESSALON) 200 MG capsule Take 1 Capsule by mouth 3 times a day as needed for Cough. (Patient not taking: Reported on 7/6/2024) 45 Capsule 0    propranolol (INDERAL) 10 MG Tab Take 1 Tablet by mouth 3 times a day. (Patient not taking: Reported on 7/6/2024) 90 Tablet 11    azithromycin (ZITHROMAX) 250 MG Tab TAKE 2 TABLETS BY MOUTH ON DAY 1, AND THEN TAKE 1 TABLET BY MOUTH ONCE A DAY ON DAY 2 THROUGH DAY 5 (Patient not taking: Reported on 4/10/2024)      benzonatate (TESSALON) 200 MG capsule TAKE 1 CAPSULE BY MOUTH THREE TIMES DAILY FOR 10 DAYS AS  NEEDED FOR COUGH. DO NOT CRUSH OR CHEW (Patient not taking: Reported on 12/4/2023)      gabapentin (NEURONTIN) 300 MG Cap Take 300 mg by mouth 3 times a day. (Patient not taking: Reported on 4/10/2024)      ibuprofen (MOTRIN) 600 MG Tab TAKE 1 TABLET BY MOUTH EVERY 6 HOURS FOR 14 DAYS (Patient not taking: Reported on 7/8/2024)      predniSONE (DELTASONE) 20 MG Tab TAKE 1 TABLET BY MOUTH TWICE DAILY FOR 5 DAYS (Patient not taking: Reported on 4/10/2024)      QUEtiapine (SEROQUEL) 50 MG tablet Take 50 mg by mouth every day. (Patient not taking: Reported on 12/4/2023)      varenicline (CHANTIX) 1 MG tablet Take 1 mg by mouth 2 times a day. (Patient not taking: Reported on 7/6/2024)      Varenicline Tartrate, Starter, 0.5 MG X 11 & 1 MG X 42 Tablet Therapy Pack TAKE 1 TABLET BY MOUTH USE AS DIRECTED ON PACKAGE (Patient not taking: Reported on 4/10/2024)      ARIPiprazole (ABILIFY) 15 MG Tab  (Patient not taking: Reported on 7/6/2024)      methocarbamol (ROBAXIN) 500 MG Tab Take 1,000 mg by mouth 4 times a day. (Patient not taking: Reported on 4/10/2024)      ARIPiprazole (ABILIFY) 10 MG Tab Take 1 Tablet by mouth every day. (Patient not taking: Reported on 12/4/2023) 30 Tablet 1    cyclobenzaprine (FLEXERIL) 10 mg Tab Take 1 Tablet by mouth 3 times a day as needed for Muscle Spasms. (Patient not taking: Reported on 7/6/2024) 30 Tablet 1    traZODone (DESYREL) 150 MG Tab Take 1 Tablet by mouth every evening. (Patient not taking: Reported on 7/6/2024) 30 Tablet 1    meclizine (ANTIVERT) 25 MG Tab Take 1 Tab by mouth 3 times a day as needed for Vertigo. (Patient not taking: Reported on 4/10/2024) 30 Tab 0     No current Epic-ordered facility-administered medications on file.       Past Medical History:   Diagnosis Date    Backache S/P L4-5    decompressive laminectomies with bilateral foramionotomies    Bipolar I disorder, most recent episode (or current) unspecified     untreated      Cough     Displacement of lumbar  "intervertebral disc without myelopathy     right sided sciatica    Dizziness and giddiness     Irregular menstrual cycle     menorrhagia lifelong    Lumbar pain  MRI   9/2008    MRI (9/2008) Left lateral disk protusion L5-S1 with left S1 root compression,small central disk  protusion at the L4-L5 with effacement of the ventral sac and small radial tear of the annulus as well as mild right lateral disk bulge at L4-L5 with neuro foraminal stenosis.    Need for Td vaccine  needed    Osteoarthrosis involving, or with mention of more than one site, but not specified as generalized, multiple sites     Pap smear  1990    Pneumonia hx.    Screening mammogram  1998    Tobacco use disorder        Social History     Tobacco Use    Smoking status: Every Day     Current packs/day: 0.25     Average packs/day: 0.3 packs/day for 30.0 years (7.5 ttl pk-yrs)     Types: Cigarettes    Smokeless tobacco: Never   Vaping Use    Vaping status: Never Used   Substance Use Topics    Alcohol use: Yes     Comment: ocassional    Drug use: No     Comment: none currently but in the past did.       Family Status   Relation Name Status    Mo  Alive        dx. of multiple myeloma at age 60. fam hx. of breast cancer and diabetes    Fa  Alive        61 basically healthy, fam. hx. of diabetes    MGMo  (Not Specified)     Family History   Problem Relation Age of Onset    Cancer Mother     Diabetes Maternal Grandmother     Cancer Maternal Grandmother        Review of Systems   Constitutional:  Negative for chills, fever and malaise/fatigue.   HENT:  Positive for congestion and ear pain. Negative for sore throat.    Respiratory:  Negative for cough and shortness of breath.    Gastrointestinal:  Negative for abdominal pain, nausea and vomiting.   Skin:  Negative for rash.   Neurological:  Positive for headaches.       Exam:  /82   Pulse 85   Temp 35.9 °C (96.7 °F) (Temporal)   Resp 16   Ht 1.6 m (5' 3\")   Wt 68.8 kg (151 lb 9.6 oz)   SpO2 98% "   Physical Exam  Vitals reviewed.   Constitutional:       General: She is not in acute distress.     Appearance: Normal appearance. She is normal weight.   HENT:      Head: Normocephalic.      Right Ear: Ear canal and external ear normal. There is no impacted cerumen. Tympanic membrane is erythematous.      Left Ear: Ear canal and external ear normal. There is no impacted cerumen. Tympanic membrane is erythematous.      Nose: Congestion present. No nasal tenderness, mucosal edema or rhinorrhea.      Mouth/Throat:      Mouth: Mucous membranes are moist.      Pharynx: No oropharyngeal exudate or posterior oropharyngeal erythema.      Tonsils: No tonsillar exudate. 1+ on the right. 1+ on the left.   Eyes:      General:         Right eye: No discharge.         Left eye: No discharge.      Extraocular Movements: Extraocular movements intact.      Conjunctiva/sclera: Conjunctivae normal.      Pupils: Pupils are equal, round, and reactive to light.   Cardiovascular:      Rate and Rhythm: Normal rate and regular rhythm.      Pulses: Normal pulses.      Heart sounds: Normal heart sounds. No murmur heard.  Pulmonary:      Effort: Pulmonary effort is normal. No respiratory distress.      Breath sounds: Normal breath sounds. No stridor. No wheezing, rhonchi or rales.   Musculoskeletal:         General: No swelling, tenderness, deformity or signs of injury.      Cervical back: Normal range of motion and neck supple.   Skin:     General: Skin is warm and dry.      Findings: No bruising, erythema, lesion or rash.   Neurological:      General: No focal deficit present.      Mental Status: She is alert.      Sensory: No sensory deficit.      Motor: No weakness.      Coordination: Coordination normal.      Gait: Gait normal.   Psychiatric:         Mood and Affect: Mood normal.         Behavior: Behavior normal.           Assessment/Plan:  1. Non-recurrent acute serous otitis media of both ears  - amoxicillin-clavulanate (AUGMENTIN)  875-125 MG Tab; Take 1 Tablet by mouth 2 times a day for 5 days.  Dispense: 10 Tablet; Refill: 0  - fluticasone (FLONASE) 50 MCG/ACT nasal spray; Administer 2 Sprays into affected nostril(S) at bedtime.  Dispense: 16 g; Refill: 1    Other orders  - metFORMIN ER (GLUCOPHAGE XR) 500 MG TABLET SR 24 HR; Take 500 mg by mouth every day.    Based on physical exam along with review of systems I did provide Augmentin today as patient has been sick for over a week with no relief from over-the-counter cold medications.  Medications in the form of Flonase sent as well.  Patient advised take medication with food, drink plenty of fluids.    Supportive care, differential diagnoses, and indications for immediate follow-up discussed with patient.   Pathogenesis of diagnosis discussed including typical length and natural progression.   Instructed to return to clinic or nearest emergency department for any change in condition, further concerns, or worsening of symptoms.  Patient states understanding of the plan of care and discharge instructions.  Instructed to make an appointment, for follow up, with  primary care provider.      Please note that this dictation was created using voice recognition software. I have made every reasonable attempt to correct obvious errors, but I expect that there are errors of grammar and possibly content that I did not discover before finalizing the note.      Mansi HODGE

## 2024-08-07 NOTE — LETTER
[facility] [address] [address]     August 7, 2024    Patient: Kristina Davis   YOB: 1970   Date of Visit: 8/7/2024       To Whom It May Concern:    Kristina Davis was seen and treated in our department on 8/7/2024. Please excuse 08/08 as well.      Sincerely,     FLOYD Haro.

## 2024-08-15 ENCOUNTER — OFFICE VISIT (OUTPATIENT)
Dept: URGENT CARE | Facility: PHYSICIAN GROUP | Age: 54
End: 2024-08-15
Payer: MEDICAID

## 2024-08-15 ENCOUNTER — APPOINTMENT (OUTPATIENT)
Dept: RADIOLOGY | Facility: IMAGING CENTER | Age: 54
End: 2024-08-15
Attending: NURSE PRACTITIONER
Payer: MEDICAID

## 2024-08-15 VITALS
WEIGHT: 150 LBS | SYSTOLIC BLOOD PRESSURE: 130 MMHG | HEART RATE: 102 BPM | TEMPERATURE: 97.2 F | HEIGHT: 63 IN | DIASTOLIC BLOOD PRESSURE: 92 MMHG | OXYGEN SATURATION: 96 % | BODY MASS INDEX: 26.58 KG/M2 | RESPIRATION RATE: 16 BRPM

## 2024-08-15 DIAGNOSIS — S39.012A BACK STRAIN, INITIAL ENCOUNTER: ICD-10-CM

## 2024-08-15 DIAGNOSIS — R07.9 CHEST PAIN, UNSPECIFIED TYPE: ICD-10-CM

## 2024-08-15 DIAGNOSIS — S29.011A MUSCLE STRAIN OF ANTERIOR CHEST WALL: ICD-10-CM

## 2024-08-15 PROCEDURE — 71046 X-RAY EXAM CHEST 2 VIEWS: CPT | Mod: TC,FY | Performed by: RADIOLOGY

## 2024-08-15 PROCEDURE — 3080F DIAST BP >= 90 MM HG: CPT | Performed by: NURSE PRACTITIONER

## 2024-08-15 PROCEDURE — 93000 ELECTROCARDIOGRAM COMPLETE: CPT | Performed by: NURSE PRACTITIONER

## 2024-08-15 PROCEDURE — 99213 OFFICE O/P EST LOW 20 MIN: CPT | Performed by: NURSE PRACTITIONER

## 2024-08-15 PROCEDURE — 3075F SYST BP GE 130 - 139MM HG: CPT | Performed by: NURSE PRACTITIONER

## 2024-08-15 RX ORDER — KETOROLAC TROMETHAMINE 15 MG/ML
15 INJECTION, SOLUTION INTRAMUSCULAR; INTRAVENOUS ONCE
Status: COMPLETED | OUTPATIENT
Start: 2024-08-15 | End: 2024-08-15

## 2024-08-15 RX ORDER — METHOCARBAMOL 500 MG/1
1000 TABLET, FILM COATED ORAL 4 TIMES DAILY
Qty: 60 TABLET | Refills: 0 | Status: SHIPPED | OUTPATIENT
Start: 2024-08-15 | End: 2024-08-25

## 2024-08-15 RX ORDER — METHYLPREDNISOLONE 4 MG
TABLET, DOSE PACK ORAL
Qty: 21 TABLET | Refills: 0 | Status: SHIPPED | OUTPATIENT
Start: 2024-08-15

## 2024-08-15 RX ADMIN — KETOROLAC TROMETHAMINE 15 MG: 15 INJECTION, SOLUTION INTRAMUSCULAR; INTRAVENOUS at 09:52

## 2024-08-15 ASSESSMENT — FIBROSIS 4 INDEX: FIB4 SCORE: 0.92

## 2024-08-15 NOTE — PROGRESS NOTES
Subjective:     Kristina Davis is a 54 y.o. female who presents for Chest Pain (R side radiating to back, pt. States she woke up at 5:30 am this morning due to pain.  When she breathes in deep she feels pain.  Hx of Asthma.)      Chest Pain       Pt presents for evaluation of a new problem. Kristina is a very pleasant 54-year-old female who presents to urgent care today with complaints of right-sided chest pain and right upper scapular pain.  She states that she awoke this morning at 5:30 AM due to her alarm clock and immediately felt discomfort with turning over.  Her pain is only present with deep breathing and with movement of her right arm and twisting.  There is no pain at rest.  Patient notes that 2 days ago she was carrying a TV which she dropped on her foot and believes that she injured her back and strained her chest wall during this accident.  No medication has been used for her discomfort.  She does suffer from chronic back pain and has used muscle relaxers in the past.  She denies any shortness of breath or wheezing at this time and only endorses pain in her chest with taking a deep breath in.  She currently denies any nausea, paresthesias, jaw pain, headache or dizziness.    Review of Systems   Cardiovascular:  Positive for chest pain.       PMH:   Past Medical History:   Diagnosis Date    Backache S/P L4-5    decompressive laminectomies with bilateral foramionotomies    Bipolar I disorder, most recent episode (or current) unspecified     untreated      Cough     Displacement of lumbar intervertebral disc without myelopathy     right sided sciatica    Dizziness and giddiness     Irregular menstrual cycle     menorrhagia lifelong    Lumbar pain  MRI   9/2008    MRI (9/2008) Left lateral disk protusion L5-S1 with left S1 root compression,small central disk  protusion at the L4-L5 with effacement of the ventral sac and small radial tear of the annulus as well as mild right lateral disk bulge at L4-L5  with neuro foraminal stenosis.    Need for Td vaccine  needed    Osteoarthrosis involving, or with mention of more than one site, but not specified as generalized, multiple sites     Pap smear  1990    Pneumonia hx.    Screening mammogram  1998    Tobacco use disorder      ALLERGIES:   Allergies   Allergen Reactions    Sulfa Drugs Hives and Anaphylaxis     Causes angioderma and hives     SURGHX:   Past Surgical History:   Procedure Laterality Date    LUMBAR LAMINECTOMY DISKECTOMY  3/18/2009    Performed by RIP GAMA at [facility]    TUBAL COAGULATION LAPAROSCOPIC BILATERAL       SOCHX:   Social History     Socioeconomic History    Marital status: Other    Highest education level: Some college, no degree   Tobacco Use    Smoking status: Every Day     Current packs/day: 0.25     Average packs/day: 0.3 packs/day for 30.0 years (7.5 ttl pk-yrs)     Types: Cigarettes    Smokeless tobacco: Never   Vaping Use    Vaping status: Never Used   Substance and Sexual Activity    Alcohol use: Yes     Comment: ocassional    Drug use: No     Comment: none currently but in the past did.    Sexual activity: Not Currently     Comment:      Social Determinants of Health     Financial Resource Strain: High Risk (1/15/2023)    Overall Financial Resource Strain (CARDIA)     Difficulty of Paying Living Expenses: Hard   Food Insecurity: Food Insecurity Present (1/15/2023)    Hunger Vital Sign     Worried About Running Out of Food in the Last Year: Sometimes true     Ran Out of Food in the Last Year: Never true   Transportation Needs: No Transportation Needs (1/15/2023)    PRAPARE - Transportation     Lack of Transportation (Medical): No     Lack of Transportation (Non-Medical): No   Physical Activity: Sufficiently Active (1/15/2023)    Exercise Vital Sign     Days of Exercise per Week: 3 days     Minutes of Exercise per Session: 150+ min   Stress: Stress Concern Present (1/15/2023)    [place] [employer]  "Occupational Health - Occupational Stress Questionnaire     Feeling of Stress : Rather much   Social Connections: Socially Isolated (1/15/2023)    Social Connection and Isolation Panel [NHANES]     Frequency of Communication with Friends and Family: Once a week     Frequency of Social Gatherings with Friends and Family: Never     Attends Yarsani Services: Never     Active Member of Clubs or Organizations: No     Attends Club or Organization Meetings: Never     Marital Status:    Housing Stability: High Risk (1/15/2023)    Housing Stability Vital Sign     Unable to Pay for Housing in the Last Year: Yes     Number of Places Lived in the Last Year: 3     Unstable Housing in the Last Year: No     FH:   Family History   Problem Relation Age of Onset    Cancer Mother     Diabetes Maternal Grandmother     Cancer Maternal Grandmother          Objective:   BP (!) 130/92   Pulse (!) 102   Temp 36.2 °C (97.2 °F) (Temporal)   Resp 16   Ht 1.6 m (5' 3\")   Wt 68 kg (150 lb)   LMP 08/01/2013   SpO2 96%   BMI 26.57 kg/m²     Physical Exam  Vitals and nursing note reviewed.   Constitutional:       General: She is not in acute distress.     Appearance: Normal appearance. She is normal weight. She is not ill-appearing or toxic-appearing.   HENT:      Head: Normocephalic.      Right Ear: External ear normal.      Left Ear: External ear normal.      Nose: No congestion or rhinorrhea.      Mouth/Throat:      Pharynx: No oropharyngeal exudate or posterior oropharyngeal erythema.   Eyes:      General:         Right eye: No discharge.         Left eye: No discharge.      Pupils: Pupils are equal, round, and reactive to light.   Cardiovascular:      Rate and Rhythm: Regular rhythm. Tachycardia present.      Pulses: Normal pulses.      Heart sounds: Normal heart sounds. No murmur heard.     No friction rub. No gallop.   Pulmonary:      Effort: Pulmonary effort is normal. No respiratory distress.      Breath sounds: No " stridor. No wheezing, rhonchi or rales.   Chest:      Chest wall: Tenderness present. No mass, deformity or swelling.       Abdominal:      General: Abdomen is flat.   Musculoskeletal:         General: Normal range of motion.        Arms:       Cervical back: Normal range of motion and neck supple.      Comments: TTP   Skin:     General: Skin is dry.   Neurological:      General: No focal deficit present.      Mental Status: She is alert and oriented to person, place, and time. Mental status is at baseline.   Psychiatric:         Mood and Affect: Mood normal.         Behavior: Behavior normal.         Thought Content: Thought content normal.         Judgment: Judgment normal.       ECG: Normal sinus rhythm rate of 81.  Compared to ECG on 12/20/2017  Assessment/Plan:   Assessment      1. Back strain, initial encounter  ketorolac (Toradol) 15 MG/ML injection 15 mg    methocarbamol (ROBAXIN) 500 MG Tab    methylPREDNISolone (MEDROL DOSEPAK) 4 MG Tablet Therapy Pack      2. Muscle strain of anterior chest wall  ketorolac (Toradol) 15 MG/ML injection 15 mg    methocarbamol (ROBAXIN) 500 MG Tab    methylPREDNISolone (MEDROL DOSEPAK) 4 MG Tablet Therapy Pack      3. Chest pain, unspecified type  EKG - Clinic Performed    DX-CHEST-2 VIEWS        Kristina does endorse chest wall tenderness with palpation and movement.  There is no pain at rest.  ECG is within normal limits.  X-ray is also normal in the clinic today.  Due to recent injury with lifting, I do believe that she is suffering from muscle wall strain.  She was given a prescription for Robaxin, Medrol Dosepak and Toradol injection in the clinic today.  Patient educated to refrain from additional NSAIDs for the next 24 hours following this medication.  Strict ER precautions given for worsening chest pain especially if this starts presenting at rest, shortness of breath, headache, paresthesias or nausea/vomiting.  She is in agreement with this plan of care.

## 2024-08-15 NOTE — LETTER
August 15, 2024    To Whom It May Concern:         This is confirmation that Kristina Celaya Susan attended her scheduled appointment with ALONA Seth on 8/15/24.  Please excuse her absence due to an acute condition.  She may return to work on 8/17/2024 sooner if better.         If you have any questions please do not hesitate to call me at the phone number listed below.    Sincerely,          FLOYD Seth.  [phone]

## 2024-09-09 ENCOUNTER — OFFICE VISIT (OUTPATIENT)
Dept: URGENT CARE | Facility: PHYSICIAN GROUP | Age: 54
End: 2024-09-09
Payer: MEDICAID

## 2024-09-09 VITALS
HEART RATE: 82 BPM | HEIGHT: 63 IN | SYSTOLIC BLOOD PRESSURE: 128 MMHG | DIASTOLIC BLOOD PRESSURE: 80 MMHG | RESPIRATION RATE: 16 BRPM | BODY MASS INDEX: 26.75 KG/M2 | OXYGEN SATURATION: 98 % | TEMPERATURE: 96.7 F | WEIGHT: 151 LBS

## 2024-09-09 DIAGNOSIS — H66.002 ACUTE SUPPURATIVE OTITIS MEDIA OF LEFT EAR WITHOUT SPONTANEOUS RUPTURE OF TYMPANIC MEMBRANE, RECURRENCE NOT SPECIFIED: ICD-10-CM

## 2024-09-09 PROCEDURE — 3079F DIAST BP 80-89 MM HG: CPT | Performed by: FAMILY MEDICINE

## 2024-09-09 PROCEDURE — 99214 OFFICE O/P EST MOD 30 MIN: CPT | Performed by: FAMILY MEDICINE

## 2024-09-09 PROCEDURE — 3074F SYST BP LT 130 MM HG: CPT | Performed by: FAMILY MEDICINE

## 2024-09-09 ASSESSMENT — FIBROSIS 4 INDEX: FIB4 SCORE: 0.92

## 2024-09-09 NOTE — PROGRESS NOTES
Subjective:      Chief Complaint   Patient presents with    Sinus Problem     Facial pressure and pain sx 4 days    Otalgia     Sx 1 wk               Otalgia - left  This is a new problem. The current episode started in the past 7 days. The problem occurs constantly. The problem has been unchanged. Associated symptoms include fever and coughing. Pertinent negatives include no abdominal pain, chest pain, chills,  , headaches, joint swelling, myalgias, nausea, neck pain, rash or visual change. Nothing aggravates the symptoms. She has tried nothing for the symptoms.     Social History     Tobacco Use    Smoking status: Every Day     Current packs/day: 0.25     Average packs/day: 0.3 packs/day for 30.0 years (7.5 ttl pk-yrs)     Types: Cigarettes    Smokeless tobacco: Never   Vaping Use    Vaping status: Never Used   Substance Use Topics    Alcohol use: Yes     Comment: ocassional    Drug use: No     Comment: none currently but in the past did.         Current Outpatient Medications on File Prior to Visit   Medication Sig Dispense Refill    metFORMIN ER (GLUCOPHAGE XR) 500 MG TABLET SR 24 HR Take 500 mg by mouth every day.      losartan (COZAAR) 50 MG Tab Take 50 mg by mouth every day.      CAPLYTA 21 MG Cap TAKE 1 CAPSULE BY MOUTH ONCE DAILY AT BEDTIME FOR 30 DAYS FOR BIPOLAR.      TRINTELLIX 10 MG tablet TAKE 1 TABLET BY MOUTH ONCE DAILY AT BEDTIME FOR DEPRESSION      hydrOXYzine HCl (ATARAX) 25 MG Tab TAKE 1 TABLET BY MOUTH THREE TIMES DAILY FOR 20 DAYS AS NEEDED FOR ANXIETY      clonazePAM (KLONOPIN) 0.5 MG Tab TAKE 1 TABLET BY MOUTH EVERY DAY FOR 15 DAYS AS NEEDED FOR PANIC ATTACKS      albuterol 108 (90 Base) MCG/ACT Aero Soln inhalation aerosol Inhale 2 Puffs every 6 hours as needed for Shortness of Breath. 8.5 g 0    FLOVENT  MCG/ACT Aerosol INHALE 2 PUFFS BY MOUTH 2 TIMES A DAY. RINSE MOUTH AND THROAT AFTER USE. USE WITH SPACER CHAMBER      methylPREDNISolone (MEDROL DOSEPAK) 4 MG Tablet Therapy Pack  Follow schedule on package instructions. (Patient not taking: Reported on 9/9/2024) 21 Tablet 0    fluticasone (FLONASE) 50 MCG/ACT nasal spray Administer 2 Sprays into affected nostril(S) at bedtime. (Patient not taking: Reported on 9/9/2024) 16 g 1    mupirocin (BACTROBAN) 2 % Ointment Apply 1 Application topically 2 times a day. (Patient not taking: Reported on 8/7/2024) 22 g 0    promethazine-dextromethorphan (PROMETHAZINE-DM) 6.25-15 MG/5ML syrup Take 5 mL by mouth every 6 hours as needed for Cough for up to 20 doses. (Patient not taking: Reported on 8/7/2024) 100 mL 0    ondansetron (ZOFRAN ODT) 4 MG TABLET DISPERSIBLE Take 1 Tablet by mouth every 8 hours as needed for Nausea/Vomiting. 10 Tablet 0    ondansetron (ZOFRAN ODT) 4 MG TABLET DISPERSIBLE Take 1 Tablet by mouth every 8 hours as needed for Nausea/Vomiting. (Patient not taking: Reported on 7/8/2024) 10 Tablet 0    ondansetron (ZOFRAN ODT) 4 MG TABLET DISPERSIBLE Take 1 Tablet by mouth every 8 hours as needed for Nausea/Vomiting. (Patient not taking: Reported on 7/18/2024) 10 Tablet 0    benzonatate (TESSALON) 200 MG capsule Take 1 Capsule by mouth 3 times a day as needed for Cough. (Patient not taking: Reported on 7/6/2024) 45 Capsule 0    propranolol (INDERAL) 10 MG Tab Take 1 Tablet by mouth 3 times a day. (Patient not taking: Reported on 7/6/2024) 90 Tablet 11    azithromycin (ZITHROMAX) 250 MG Tab TAKE 2 TABLETS BY MOUTH ON DAY 1, AND THEN TAKE 1 TABLET BY MOUTH ONCE A DAY ON DAY 2 THROUGH DAY 5 (Patient not taking: Reported on 4/10/2024)      benzonatate (TESSALON) 200 MG capsule TAKE 1 CAPSULE BY MOUTH THREE TIMES DAILY FOR 10 DAYS AS NEEDED FOR COUGH. DO NOT CRUSH OR CHEW (Patient not taking: Reported on 12/4/2023)      gabapentin (NEURONTIN) 300 MG Cap Take 300 mg by mouth 3 times a day. (Patient not taking: Reported on 4/10/2024)      ibuprofen (MOTRIN) 600 MG Tab TAKE 1 TABLET BY MOUTH EVERY 6 HOURS FOR 14 DAYS (Patient not taking: Reported  on 7/8/2024)      omeprazole (PRILOSEC) 40 MG delayed-release capsule Take 40 mg by mouth every day. FOR 90 DAYS (Patient not taking: Reported on 8/15/2024)      predniSONE (DELTASONE) 20 MG Tab TAKE 1 TABLET BY MOUTH TWICE DAILY FOR 5 DAYS (Patient not taking: Reported on 4/10/2024)      QUEtiapine (SEROQUEL) 50 MG tablet Take 50 mg by mouth every day. (Patient not taking: Reported on 12/4/2023)      varenicline (CHANTIX) 1 MG tablet Take 1 mg by mouth 2 times a day. (Patient not taking: Reported on 7/6/2024)      Varenicline Tartrate, Starter, 0.5 MG X 11 & 1 MG X 42 Tablet Therapy Pack TAKE 1 TABLET BY MOUTH USE AS DIRECTED ON PACKAGE (Patient not taking: Reported on 4/10/2024)      ARIPiprazole (ABILIFY) 15 MG Tab  (Patient not taking: Reported on 7/6/2024)      methocarbamol (ROBAXIN) 500 MG Tab Take 1,000 mg by mouth 4 times a day. (Patient not taking: Reported on 4/10/2024)      ARIPiprazole (ABILIFY) 10 MG Tab Take 1 Tablet by mouth every day. (Patient not taking: Reported on 12/4/2023) 30 Tablet 1    buPROPion SR (WELLBUTRIN SR) 150 MG TABLET SR 12 HR sustained-release tablet Take 1 Tablet by mouth 2 times a day. (Patient not taking: Reported on 9/9/2024) 60 Tablet 1    cyclobenzaprine (FLEXERIL) 10 mg Tab Take 1 Tablet by mouth 3 times a day as needed for Muscle Spasms. (Patient not taking: Reported on 7/6/2024) 30 Tablet 1    traZODone (DESYREL) 150 MG Tab Take 1 Tablet by mouth every evening. (Patient not taking: Reported on 7/6/2024) 30 Tablet 1    meclizine (ANTIVERT) 25 MG Tab Take 1 Tab by mouth 3 times a day as needed for Vertigo. (Patient not taking: Reported on 4/10/2024) 30 Tab 0     No current facility-administered medications on file prior to visit.         Past Medical History:   Diagnosis Date    Screening mammogram  1998    Pap smear  1990    Backache S/P L4-5    decompressive laminectomies with bilateral foramionotomies    Bipolar I disorder, most recent episode (or current) unspecified      "untreated      Cough     Displacement of lumbar intervertebral disc without myelopathy     right sided sciatica    Dizziness and giddiness     Irregular menstrual cycle     menorrhagia lifelong    Lumbar pain  MRI   9/2008    MRI (9/2008) Left lateral disk protusion L5-S1 with left S1 root compression,small central disk  protusion at the L4-L5 with effacement of the ventral sac and small radial tear of the annulus as well as mild right lateral disk bulge at L4-L5 with neuro foraminal stenosis.    Need for Td vaccine  needed    Osteoarthrosis involving, or with mention of more than one site, but not specified as generalized, multiple sites     Pneumonia hx.    Tobacco use disorder          Family History   Problem Relation Age of Onset    Cancer Mother     Diabetes Maternal Grandmother     Cancer Maternal Grandmother           Review of Systems   Constitutional: +fever  HENT: Positive for congestion and ear pain. Negative for hearing loss and tinnitus.    Respiratory:   Negative for hemoptysis, shortness of breath and wheezing.    Cardiovascular: Negative for chest pain, palpitations and leg swelling.   Gastrointestinal: Negative for nausea and abdominal pain.   Musculoskeletal: Negative for myalgias, joint swelling and neck pain.   Skin: Negative for rash.   Neurological: Negative for headaches.   All other systems reviewed and are negative.         Objective:     /80   Pulse 82   Temp 35.9 °C (96.7 °F) (Temporal)   Resp 16   Ht 1.6 m (5' 3\")   Wt 68.5 kg (151 lb)   SpO2 98%     Physical Exam   Constitutional: Vital signs are normal.  No distress.   HENT:   Head: There is normal jaw occlusion.   Right Ear: External ear normal. Tympanic membrane is normal. No middle ear effusion.   Left Ear: External ear normal. Tympanic membrane is abnormal - erythematous and bulging. A middle ear effusion is present.   Nose: Rhinorrhea and congestion present. No nasal discharge.   Mouth/Throat: Mucous membranes are " moist. No oral lesions. Pharynx erythema present. No oropharyngeal exudate, pharynx swelling or pharynx petechiae. Tonsils are 0 on the right. Tonsils are 0 on the left. No tonsillar exudate.   Eyes: Conjunctivae and EOM are normal. Pupils are equal, round, and reactive to light. Right eye exhibits no discharge. Left eye exhibits no discharge.   Neck: Normal range of motion. Neck supple. Cervical adenopathy present.   Cardiovascular: Normal rate and regular rhythm.  Pulses are palpable.    No murmur heard.  Pulmonary/Chest: Effort normal and breath sounds normal. There is normal air entry. No respiratory distress. no wheezes, rhonchi,  retraction.   Musculoskeletal:   no edema.   Neurological: A/O x 3.   CN 2-12 intact   Skin: Skin is warm. Capillary refill takes less than 3 seconds. No purpura and no rash noted. Patient is not diaphoretic. No jaundice or pallor.   Nursing note and vitals reviewed.              Assessment/Plan:     1. Acute suppurative otitis media of left ear without spontaneous rupture of tympanic membrane, recurrence not specified     - amoxicillin-clavulanate (AUGMENTIN) 875-125 MG Tab; Take 1 Tablet by mouth 2 times a day for 10 days.  Dispense: 20 Tablet; Refill: 0    Differential diagnosis, natural history, supportive care, and indications for immediate follow-up discussed. All questions answered. Patient agrees with the plan of care.     Follow-up as needed if symptoms worsen or fail to improve to PCP, Urgent care or Emergency Room.     I have personally reviewed prior external notes and test results pertinent to today's visit.  I have independently reviewed and interpreted all diagnostics ordered during this urgent care acute visit.

## 2024-09-09 NOTE — LETTER
September 9, 2024         Patient: Kristina Davis   YOB: 1970   Date of Visit: 9/9/2024           To Whom it May Concern:    Kristina Davis was seen in my clinic on 9/9/2024.      Please excuse absence due to illness.       If you have any questions or concerns, please don't hesitate to call.        Sincerely,           Yung Malik M.D.  Electronically Signed

## 2024-09-26 ENCOUNTER — OFFICE VISIT (OUTPATIENT)
Dept: URGENT CARE | Facility: PHYSICIAN GROUP | Age: 54
End: 2024-09-26
Payer: MEDICAID

## 2024-09-26 VITALS
RESPIRATION RATE: 20 BRPM | SYSTOLIC BLOOD PRESSURE: 120 MMHG | HEART RATE: 85 BPM | WEIGHT: 148 LBS | OXYGEN SATURATION: 97 % | HEIGHT: 63 IN | BODY MASS INDEX: 26.22 KG/M2 | TEMPERATURE: 97.3 F | DIASTOLIC BLOOD PRESSURE: 62 MMHG

## 2024-09-26 DIAGNOSIS — B34.9 VIRAL ILLNESS: ICD-10-CM

## 2024-09-26 DIAGNOSIS — Z20.822 EXPOSURE TO COVID-19 VIRUS: ICD-10-CM

## 2024-09-26 LAB
FLUAV RNA SPEC QL NAA+PROBE: NEGATIVE
FLUBV RNA SPEC QL NAA+PROBE: NEGATIVE
RSV RNA SPEC QL NAA+PROBE: NEGATIVE
SARS-COV-2 RNA RESP QL NAA+PROBE: NEGATIVE

## 2024-09-26 PROCEDURE — 99213 OFFICE O/P EST LOW 20 MIN: CPT | Performed by: PHYSICIAN ASSISTANT

## 2024-09-26 PROCEDURE — 3078F DIAST BP <80 MM HG: CPT | Performed by: PHYSICIAN ASSISTANT

## 2024-09-26 PROCEDURE — 3074F SYST BP LT 130 MM HG: CPT | Performed by: PHYSICIAN ASSISTANT

## 2024-09-26 PROCEDURE — 87637 SARSCOV2&INF A&B&RSV AMP PRB: CPT | Mod: QW | Performed by: PHYSICIAN ASSISTANT

## 2024-09-26 ASSESSMENT — FIBROSIS 4 INDEX: FIB4 SCORE: 0.92

## 2024-09-26 NOTE — LETTER
September 26, 2024         Patient: Kristina Davis   YOB: 1970   Date of Visit: 9/26/2024           To Whom it May Concern:    Kristina Davis was seen in my clinic on 9/26/2024. Please excuse from work today and tomorrow.     If you have any questions or concerns, please don't hesitate to call.        Sincerely,           Eusebio Damon P.A.-C.  Electronically Signed

## 2024-09-26 NOTE — PROGRESS NOTES
"Subjective:   Kristina Davis is a 54 y.o. female who presents for Otalgia (On and off for 1 month pt was seen and given medication but the pain came back ), Fatigue (Chills x since Tuesday ), and Nasal Congestion (Runny nose x since tuesday )      HPI  The patient presents to the Urgent Care with complaints of flulike symptoms onset 2 days ago.  Positive secondary exposure to COVID.  Reports of a fever of 101F and felt feverish last night.  Continued chills.  Body aches resolved.  Continues to have fatigue.  Mild cough.  Nasal congestion.  Diarrhea. Denies any chest pain, SOB, vomiting. Smokes cigarettes. History of COVID x 8 without complications.  She has been treated with Paxlovid in the past and tolerates this well.  COVID vaccines up to date.         Past Medical History:   Diagnosis Date    Backache S/P L4-5    decompressive laminectomies with bilateral foramionotomies    Bipolar I disorder, most recent episode (or current) unspecified     untreated      Cough     Displacement of lumbar intervertebral disc without myelopathy     right sided sciatica    Dizziness and giddiness     Irregular menstrual cycle     menorrhagia lifelong    Lumbar pain  MRI   9/2008    MRI (9/2008) Left lateral disk protusion L5-S1 with left S1 root compression,small central disk  protusion at the L4-L5 with effacement of the ventral sac and small radial tear of the annulus as well as mild right lateral disk bulge at L4-L5 with neuro foraminal stenosis.    Need for Td vaccine  needed    Osteoarthrosis involving, or with mention of more than one site, but not specified as generalized, multiple sites     Pap smear  1990    Pneumonia hx.    Screening mammogram  1998    Tobacco use disorder      Allergies   Allergen Reactions    Sulfa Drugs Hives and Anaphylaxis     Causes angioderma and hives        Objective:     /62   Pulse 85   Temp 36.3 °C (97.3 °F) (Temporal)   Resp 20   Ht 1.6 m (5' 3\")   Wt 67.1 kg (148 lb)   " LMP 08/01/2013   SpO2 97%   BMI 26.22 kg/m²     Physical Exam  Vitals reviewed.   Constitutional:       General: She is not in acute distress.     Appearance: Normal appearance. She is not ill-appearing or toxic-appearing.   HENT:      Mouth/Throat:      Mouth: Mucous membranes are moist.      Pharynx: Oropharynx is clear. Uvula midline. Posterior oropharyngeal erythema (trace) present. No pharyngeal swelling, oropharyngeal exudate or uvula swelling.      Tonsils: No tonsillar exudate or tonsillar abscesses.   Eyes:      Conjunctiva/sclera: Conjunctivae normal.   Cardiovascular:      Rate and Rhythm: Normal rate and regular rhythm.      Heart sounds: Normal heart sounds.   Pulmonary:      Effort: Pulmonary effort is normal. No respiratory distress.      Breath sounds: Normal breath sounds. No wheezing, rhonchi or rales.   Musculoskeletal:      Cervical back: Neck supple.   Skin:     General: Skin is warm and dry.   Neurological:      General: No focal deficit present.      Mental Status: She is alert and oriented to person, place, and time.   Psychiatric:         Mood and Affect: Mood normal.         Behavior: Behavior normal.       Results for orders placed or performed in visit on 09/26/24   POCT CEPHEID COV-2, FLU A/B, RSV - PCR   Result Value Ref Range    SARS-CoV-2 by PCR Negative Negative, Invalid    Influenza virus A RNA Negative Negative, Invalid    Influenza virus B, PCR Negative Negative, Invalid    RSV, PCR Negative Negative, Invalid         Diagnosis and associated orders:     1. Viral illness  - POCT CEPHEID COV-2, FLU A/B, RSV - PCR    2. Exposure to COVID-19 virus  - POCT CEPHEID COV-2, FLU A/B, RSV - PCR       Comments/MDM:     The patient's presenting symptoms and exam findings most likely are due to a viral etiology.   Symptomatic and supportive care:   Plenty of oral hydration and rest   Over the counter cough suppressant as directed.  Tylenol or ibuprofen for pain and fever as directed.   Warm  salt water gargles for sore throat, soft foods, cool liquids.   Saline nasal spray and Flonase  Infection control measures at home. Stay away from people, Hand washing, covering sneeze/cough, disinfect surfaces.   Remain home from work, school, and other populated environments.    Overall, the patient is well-appearing. They are not hypoxic, afebrile, and a normal pulmonary exam.       I personally reviewed prior external notes and test results pertinent to today's visit. Pathogenesis of diagnosis discussed including typical length and natural progression. Supportive care, natural history, differential diagnoses, and indications for immediate follow-up discussed. Patient expresses understanding and agrees to plan. Patient denies any other questions or concerns.     Follow-up with the primary care physician for recheck, reevaluation, and consideration of further management.    Please note that this dictation was created using voice recognition software. I have made a reasonable attempt to correct obvious errors, but I expect that there are errors of grammar and possibly content that I did not discover before finalizing the note.    This note was electronically signed by Eusebio Damon PA-C

## 2024-10-28 ENCOUNTER — OFFICE VISIT (OUTPATIENT)
Dept: URGENT CARE | Facility: PHYSICIAN GROUP | Age: 54
End: 2024-10-28
Payer: MEDICAID

## 2024-10-28 ENCOUNTER — APPOINTMENT (OUTPATIENT)
Dept: RADIOLOGY | Facility: IMAGING CENTER | Age: 54
End: 2024-10-28
Attending: FAMILY MEDICINE
Payer: MEDICAID

## 2024-10-28 VITALS
DIASTOLIC BLOOD PRESSURE: 64 MMHG | WEIGHT: 149 LBS | RESPIRATION RATE: 16 BRPM | HEART RATE: 59 BPM | TEMPERATURE: 96.5 F | HEIGHT: 63 IN | SYSTOLIC BLOOD PRESSURE: 118 MMHG | OXYGEN SATURATION: 95 % | BODY MASS INDEX: 26.4 KG/M2

## 2024-10-28 DIAGNOSIS — R05.1 ACUTE COUGH: ICD-10-CM

## 2024-10-28 PROCEDURE — 99213 OFFICE O/P EST LOW 20 MIN: CPT | Performed by: FAMILY MEDICINE

## 2024-10-28 PROCEDURE — 3074F SYST BP LT 130 MM HG: CPT | Performed by: FAMILY MEDICINE

## 2024-10-28 PROCEDURE — 3078F DIAST BP <80 MM HG: CPT | Performed by: FAMILY MEDICINE

## 2024-10-28 PROCEDURE — 87637 SARSCOV2&INF A&B&RSV AMP PRB: CPT | Mod: QW | Performed by: FAMILY MEDICINE

## 2024-10-28 RX ORDER — ALBUTEROL SULFATE 90 UG/1
2 INHALANT RESPIRATORY (INHALATION) EVERY 6 HOURS PRN
Qty: 1 EACH | Refills: 0 | Status: SHIPPED | OUTPATIENT
Start: 2024-10-28

## 2024-10-28 RX ORDER — BENZONATATE 200 MG/1
200 CAPSULE ORAL 3 TIMES DAILY PRN
Qty: 45 CAPSULE | Refills: 0 | Status: SHIPPED | OUTPATIENT
Start: 2024-10-28

## 2024-10-28 RX ORDER — METHYLPREDNISOLONE 4 MG/1
TABLET ORAL
Qty: 21 TABLET | Refills: 0 | Status: SHIPPED | OUTPATIENT
Start: 2024-10-28

## 2024-10-28 ASSESSMENT — FIBROSIS 4 INDEX: FIB4 SCORE: 0.92

## 2024-11-05 ENCOUNTER — OFFICE VISIT (OUTPATIENT)
Dept: URGENT CARE | Facility: PHYSICIAN GROUP | Age: 54
End: 2024-11-05
Payer: MEDICAID

## 2024-11-05 VITALS
RESPIRATION RATE: 18 BRPM | DIASTOLIC BLOOD PRESSURE: 82 MMHG | HEIGHT: 63 IN | OXYGEN SATURATION: 97 % | SYSTOLIC BLOOD PRESSURE: 122 MMHG | TEMPERATURE: 97.6 F | HEART RATE: 74 BPM | BODY MASS INDEX: 26.4 KG/M2 | WEIGHT: 149 LBS

## 2024-11-05 DIAGNOSIS — R51.9 ACUTE INTRACTABLE HEADACHE, UNSPECIFIED HEADACHE TYPE: ICD-10-CM

## 2024-11-05 PROCEDURE — 3074F SYST BP LT 130 MM HG: CPT | Performed by: NURSE PRACTITIONER

## 2024-11-05 PROCEDURE — 3079F DIAST BP 80-89 MM HG: CPT | Performed by: NURSE PRACTITIONER

## 2024-11-05 PROCEDURE — 99213 OFFICE O/P EST LOW 20 MIN: CPT | Performed by: NURSE PRACTITIONER

## 2024-11-05 RX ORDER — KETOROLAC TROMETHAMINE 15 MG/ML
15 INJECTION, SOLUTION INTRAMUSCULAR; INTRAVENOUS ONCE
Status: COMPLETED | OUTPATIENT
Start: 2024-11-05 | End: 2024-11-05

## 2024-11-05 RX ORDER — KETOROLAC TROMETHAMINE 15 MG/ML
15 INJECTION, SOLUTION INTRAMUSCULAR; INTRAVENOUS ONCE
Status: DISCONTINUED | OUTPATIENT
Start: 2024-11-05 | End: 2024-11-05

## 2024-11-05 RX ADMIN — KETOROLAC TROMETHAMINE 15 MG: 15 INJECTION, SOLUTION INTRAMUSCULAR; INTRAVENOUS at 16:39

## 2024-11-05 ASSESSMENT — VISUAL ACUITY
OD_CC: 20/25
OS_CC: 20/40

## 2024-11-05 ASSESSMENT — FIBROSIS 4 INDEX: FIB4 SCORE: 0.92

## 2024-11-05 ASSESSMENT — ENCOUNTER SYMPTOMS: HEADACHES: 1

## 2024-11-05 NOTE — PROGRESS NOTES
"Subjective:     Kristina Davis is a 54 y.o. female who presents for Headache (Little dizzy, pt states her vision is a little hazy )          Review of Systems   Neurological:  Positive for headaches.   Kristina is a pleasant 54 year old female who presents to  today with complaints of an acute headache that started this morning. Her headache is now accompanied by \"hazy vision\". She does wear glasses and states she did call her eye doctor for an appointment next week. She does endorse fatigue and mild dizziness. Headache is rated as a 2/10. Not consistent with previous migraines. She has used Tylenol.  She also notes a painful left cervical lymph node that she developed today.  She denies any chest pain or shortness of breath.  Negative for numbness or tingling.  She was recently treated for bronchitis on 10/20/2024.    PMH:   Past Medical History:   Diagnosis Date    Backache S/P L4-5    decompressive laminectomies with bilateral foramionotomies    Bipolar I disorder, most recent episode (or current) unspecified     untreated      Cough     Displacement of lumbar intervertebral disc without myelopathy     right sided sciatica    Dizziness and giddiness     Irregular menstrual cycle     menorrhagia lifelong    Lumbar pain  MRI   9/2008    MRI (9/2008) Left lateral disk protusion L5-S1 with left S1 root compression,small central disk  protusion at the L4-L5 with effacement of the ventral sac and small radial tear of the annulus as well as mild right lateral disk bulge at L4-L5 with neuro foraminal stenosis.    Need for Td vaccine  needed    Osteoarthrosis involving, or with mention of more than one site, but not specified as generalized, multiple sites     Pap smear  1990    Pneumonia hx.    Screening mammogram  1998    Tobacco use disorder      ALLERGIES:   Allergies   Allergen Reactions    Sulfa Drugs Hives and Anaphylaxis     Causes angioderma and hives     SURGHX:   Past Surgical History:   Procedure " Laterality Date    LUMBAR LAMINECTOMY DISKECTOMY  3/18/2009    Performed by RIP GAMA at [facility]    TUBAL COAGULATION LAPAROSCOPIC BILATERAL       SOCHX:   Social History     Socioeconomic History    Marital status: Other    Highest education level: Some college, no degree   Tobacco Use    Smoking status: Every Day     Current packs/day: 0.25     Average packs/day: 0.3 packs/day for 30.0 years (7.5 ttl pk-yrs)     Types: Cigarettes    Smokeless tobacco: Never   Vaping Use    Vaping status: Never Used   Substance and Sexual Activity    Alcohol use: Yes     Comment: ocassional    Drug use: No     Comment: none currently but in the past did.    Sexual activity: Not Currently     Comment:      Social Drivers of Health     Financial Resource Strain: High Risk (1/15/2023)    Overall Financial Resource Strain (CARDIA)     Difficulty of Paying Living Expenses: Hard   Food Insecurity: Food Insecurity Present (1/15/2023)    Hunger Vital Sign     Worried About Running Out of Food in the Last Year: Sometimes true     Ran Out of Food in the Last Year: Never true   Transportation Needs: No Transportation Needs (1/15/2023)    PRAPARE - Transportation     Lack of Transportation (Medical): No     Lack of Transportation (Non-Medical): No   Physical Activity: Sufficiently Active (1/15/2023)    Exercise Vital Sign     Days of Exercise per Week: 3 days     Minutes of Exercise per Session: 150+ min   Stress: Stress Concern Present (1/15/2023)    [employer] [place] [employer] - Occupational Stress Questionnaire     Feeling of Stress : Rather much   Social Connections: Socially Isolated (1/15/2023)    Social Connection and Isolation Panel [NHANES]     Frequency of Communication with Friends and Family: Once a week     Frequency of Social Gatherings with Friends and Family: Never     Attends [employer] Services: Never     Active Member of Clubs or Organizations: No     Attends Club or Organization  "Meetings: Never     Marital Status:    Housing Stability: High Risk (1/15/2023)    Housing Stability Vital Sign     Unable to Pay for Housing in the Last Year: Yes     Number of Places Lived in the Last Year: 3     Unstable Housing in the Last Year: No     FH:   Family History   Problem Relation Age of Onset    Cancer Mother     Diabetes Maternal Grandmother     Cancer Maternal Grandmother          Objective:   /82   Pulse 74   Temp 36.4 °C (97.6 °F) (Temporal)   Resp 18   Ht 1.6 m (5' 3\")   Wt 67.6 kg (149 lb)   LMP 08/01/2013   SpO2 97%   BMI 26.39 kg/m²     Physical Exam  Vitals and nursing note reviewed.   Constitutional:       General: She is not in acute distress.     Appearance: Normal appearance. She is normal weight. She is not ill-appearing or toxic-appearing.   HENT:      Head: Normocephalic.      Right Ear: Tympanic membrane, ear canal and external ear normal.      Left Ear: Tympanic membrane, ear canal and external ear normal.      Nose: No congestion or rhinorrhea.      Mouth/Throat:      Pharynx: No oropharyngeal exudate or posterior oropharyngeal erythema.   Eyes:      General:         Right eye: No discharge.         Left eye: No discharge.      Pupils: Pupils are equal, round, and reactive to light.      Comments: OD: 20/25  OS: 20/40  Both 20/25   Cardiovascular:      Rate and Rhythm: Normal rate and regular rhythm.      Pulses: Normal pulses.      Heart sounds: Normal heart sounds.   Pulmonary:      Effort: Pulmonary effort is normal. No respiratory distress.      Breath sounds: No stridor. No wheezing, rhonchi or rales.   Chest:      Chest wall: No tenderness.   Abdominal:      General: Abdomen is flat.   Musculoskeletal:         General: Normal range of motion.      Cervical back: Normal range of motion and neck supple. Tenderness present.   Lymphadenopathy:      Cervical: Cervical adenopathy present.   Skin:     General: Skin is dry.   Neurological:      General: No " focal deficit present.      Mental Status: She is alert and oriented to person, place, and time. Mental status is at baseline.   Psychiatric:         Mood and Affect: Mood normal.         Behavior: Behavior normal.         Thought Content: Thought content normal.         Judgment: Judgment normal.         Assessment/Plan:   Assessment    1. Acute intractable headache, unspecified headache type  ketorolac (Toradol) 15 MG/ML injection 15 mg    DISCONTINUED: ketorolac (Toradol) 15 MG/ML injection 15 mg        Differential diagnoses discussed with patient.  She currently is exhibiting no chest pain, shortness of breath, paresthesias.  It is probable that she is likely experiencing prodromal viral infection symptoms.  She was given 15 mg IM injection of Toradol.  She was instructed not to use any additional NSAIDs for the next 12 hours following use of this medication.  She may continue to use Tylenol 4 hours as needed for further relief of headache.  Exam in clinic today was normal.  She does have a follow-up visit with her optometrist next week.  Strict ER precautions were given.  Work note provided today.

## 2024-12-10 ENCOUNTER — OFFICE VISIT (OUTPATIENT)
Dept: URGENT CARE | Facility: PHYSICIAN GROUP | Age: 54
End: 2024-12-10
Payer: MEDICAID

## 2024-12-10 ENCOUNTER — HOSPITAL ENCOUNTER (OUTPATIENT)
Facility: MEDICAL CENTER | Age: 54
End: 2024-12-10
Attending: STUDENT IN AN ORGANIZED HEALTH CARE EDUCATION/TRAINING PROGRAM
Payer: MEDICAID

## 2024-12-10 VITALS
BODY MASS INDEX: 25.87 KG/M2 | DIASTOLIC BLOOD PRESSURE: 82 MMHG | HEART RATE: 97 BPM | HEIGHT: 63 IN | OXYGEN SATURATION: 97 % | WEIGHT: 146 LBS | SYSTOLIC BLOOD PRESSURE: 128 MMHG | RESPIRATION RATE: 18 BRPM | TEMPERATURE: 97.2 F

## 2024-12-10 DIAGNOSIS — M54.50 ACUTE RIGHT-SIDED LOW BACK PAIN WITHOUT SCIATICA: ICD-10-CM

## 2024-12-10 LAB
APPEARANCE UR: NORMAL
BILIRUB UR STRIP-MCNC: NORMAL MG/DL
COLOR UR AUTO: NORMAL
GLUCOSE UR STRIP.AUTO-MCNC: NORMAL MG/DL
KETONES UR STRIP.AUTO-MCNC: NORMAL MG/DL
LEUKOCYTE ESTERASE UR QL STRIP.AUTO: NORMAL
NITRITE UR QL STRIP.AUTO: NORMAL
PH UR STRIP.AUTO: 5.5 [PH] (ref 5–8)
PROT UR QL STRIP: NORMAL MG/DL
RBC UR QL AUTO: NORMAL
SP GR UR STRIP.AUTO: >=1.03
UROBILINOGEN UR STRIP-MCNC: 0.2 MG/DL

## 2024-12-10 PROCEDURE — 81002 URINALYSIS NONAUTO W/O SCOPE: CPT | Performed by: STUDENT IN AN ORGANIZED HEALTH CARE EDUCATION/TRAINING PROGRAM

## 2024-12-10 PROCEDURE — 99213 OFFICE O/P EST LOW 20 MIN: CPT | Mod: 25 | Performed by: STUDENT IN AN ORGANIZED HEALTH CARE EDUCATION/TRAINING PROGRAM

## 2024-12-10 PROCEDURE — 87086 URINE CULTURE/COLONY COUNT: CPT

## 2024-12-10 PROCEDURE — 3079F DIAST BP 80-89 MM HG: CPT | Performed by: STUDENT IN AN ORGANIZED HEALTH CARE EDUCATION/TRAINING PROGRAM

## 2024-12-10 PROCEDURE — 3074F SYST BP LT 130 MM HG: CPT | Performed by: STUDENT IN AN ORGANIZED HEALTH CARE EDUCATION/TRAINING PROGRAM

## 2024-12-10 ASSESSMENT — ENCOUNTER SYMPTOMS
PARESIS: 0
NUMBNESS: 0
VOMITING: 0
FEVER: 0
ABDOMINAL PAIN: 0
PERIANAL NUMBNESS: 0
TINGLING: 0
CHILLS: 1
PARESTHESIAS: 0
CONSTIPATION: 0
FLANK PAIN: 1
DIARRHEA: 0
BACK PAIN: 1
NAUSEA: 0
BOWEL INCONTINENCE: 0
WEAKNESS: 0

## 2024-12-10 ASSESSMENT — FIBROSIS 4 INDEX: FIB4 SCORE: 0.92

## 2024-12-10 NOTE — LETTER
December 12, 2024    To Whom It May Concern:         This is confirmation that Kristina Celaya ArunRicky attended her scheduled appointment with Tere Webb P.A.-C. on 12/10/24. Please excuse work absences through 12/13/24 for medical reasons. Kristina can return to work without restrictions on 12/14/24 or earlier as long as symptoms have improved/resolved.          Sincerely,    Tere Webb P.A.-C.  [phone]                /

## 2024-12-11 DIAGNOSIS — M54.50 ACUTE RIGHT-SIDED LOW BACK PAIN WITHOUT SCIATICA: ICD-10-CM

## 2024-12-11 NOTE — PROGRESS NOTES
"Subjective     Kristina Davis is a 54 y.o. female who presents with Back Pain (Lower back x 1 day, got worse this morning )            Right-sided low back pain/flank pain x yesterday. No injury/trauma. Concerned for kidney infection. No urinary symptoms. No fever. States she has been \"colder\" than normal so potentially has had chills.    Back Pain  This is a new problem. The current episode started yesterday. The pain is present in the lumbar spine. The pain does not radiate. Pertinent negatives include no abdominal pain, bladder incontinence, bowel incontinence, dysuria, fever, numbness, paresis, paresthesias, pelvic pain, perianal numbness, tingling or weakness.       Review of Systems   Constitutional:  Positive for chills. Negative for fever and malaise/fatigue.   Gastrointestinal:  Negative for abdominal pain, bowel incontinence, constipation, diarrhea, nausea and vomiting.   Genitourinary:  Positive for flank pain. Negative for bladder incontinence, dysuria, frequency, hematuria, pelvic pain and urgency.   Musculoskeletal:  Positive for back pain.   Neurological:  Negative for tingling, weakness, numbness and paresthesias.   All other systems reviewed and are negative.             Objective     /82   Pulse 97   Temp 36.2 °C (97.2 °F) (Temporal)   Resp 18   Ht 1.6 m (5' 3\")   Wt 66.2 kg (146 lb)   LMP 08/01/2013   SpO2 97%   BMI 25.86 kg/m²      Physical Exam  Vitals reviewed.   Constitutional:       General: She is not in acute distress.     Appearance: Normal appearance. She is not ill-appearing, toxic-appearing or diaphoretic.   HENT:      Head: Normocephalic and atraumatic.      Nose: Nose normal.   Eyes:      Extraocular Movements: Extraocular movements intact.      Conjunctiva/sclera: Conjunctivae normal.      Pupils: Pupils are equal, round, and reactive to light.   Cardiovascular:      Rate and Rhythm: Normal rate.   Pulmonary:      Effort: Pulmonary effort is normal. "   Abdominal:      General: Abdomen is flat.      Palpations: Abdomen is soft.      Tenderness: There is no right CVA tenderness or left CVA tenderness.   Musculoskeletal:      Cervical back: Normal.      Thoracic back: Normal.      Lumbar back: No swelling, deformity, tenderness or bony tenderness. Normal range of motion.   Skin:     General: Skin is warm and dry.   Neurological:      General: No focal deficit present.      Mental Status: She is alert and oriented to person, place, and time.                             Assessment & Plan        Assessment & Plan  Acute right-sided low back pain without sciatica    Orders:    POCT Urinalysis    URINE CULTURE(NEW); Future           Most likely [facility]. Patient concerned for kidney infection. No urinary symptoms.  POCT urinalysis within normal limits.  No CVA tenderness on physical exam.  Will send urine out for culture.  Will contact patient of any positive urine culture results and started on appropriate antibiotic treatment at that time if indicated.  Otherwise advised to treat supportively with rest, ice/heat, OTC Tylenol/ibuprofen, gentle ROM/stretching.    Differential diagnoses, supportive care measures and indications for immediate follow-up discussed with patient. Pathogenesis of diagnosis discussed including typical length and natural progression.      Instructed to return to urgent care or nearest emergency department if symptoms fail to improve, for any change in condition, further concerns, or new concerning symptoms.    Patient states understanding and agrees with the plan of care and discharge instructions.

## 2024-12-13 LAB
BACTERIA UR CULT: NORMAL
SIGNIFICANT IND 70042: NORMAL
SITE SITE: NORMAL
SOURCE SOURCE: NORMAL

## 2024-12-20 ENCOUNTER — OFFICE VISIT (OUTPATIENT)
Dept: URGENT CARE | Facility: PHYSICIAN GROUP | Age: 54
End: 2024-12-20
Payer: MEDICAID

## 2024-12-20 VITALS
OXYGEN SATURATION: 95 % | SYSTOLIC BLOOD PRESSURE: 170 MMHG | RESPIRATION RATE: 12 BRPM | BODY MASS INDEX: 25.69 KG/M2 | HEART RATE: 95 BPM | TEMPERATURE: 98.8 F | DIASTOLIC BLOOD PRESSURE: 90 MMHG | WEIGHT: 145 LBS

## 2024-12-20 DIAGNOSIS — M54.16 LUMBAR RADICULOPATHY: ICD-10-CM

## 2024-12-20 DIAGNOSIS — I10 PRIMARY HYPERTENSION: ICD-10-CM

## 2024-12-20 PROCEDURE — 99214 OFFICE O/P EST MOD 30 MIN: CPT | Performed by: FAMILY MEDICINE

## 2024-12-20 PROCEDURE — 3080F DIAST BP >= 90 MM HG: CPT | Performed by: FAMILY MEDICINE

## 2024-12-20 PROCEDURE — 3077F SYST BP >= 140 MM HG: CPT | Performed by: FAMILY MEDICINE

## 2024-12-20 RX ORDER — HYDROCODONE BITARTRATE AND ACETAMINOPHEN 5; 325 MG/1; MG/1
1 TABLET ORAL EVERY 8 HOURS PRN
Qty: 10 TABLET | Refills: 0 | Status: SHIPPED | OUTPATIENT
Start: 2024-12-20 | End: 2024-12-27

## 2024-12-20 RX ORDER — CYCLOBENZAPRINE HCL 5 MG
5-10 TABLET ORAL EVERY 8 HOURS PRN
Qty: 30 TABLET | Refills: 0 | Status: SHIPPED | OUTPATIENT
Start: 2024-12-20

## 2024-12-20 RX ORDER — DESVENLAFAXINE 50 MG/1
TABLET, FILM COATED, EXTENDED RELEASE ORAL
COMMUNITY
Start: 2024-11-06

## 2024-12-20 RX ORDER — METHYLPREDNISOLONE 4 MG/1
TABLET ORAL
Qty: 21 TABLET | Refills: 0 | Status: SHIPPED | OUTPATIENT
Start: 2024-12-20

## 2024-12-20 RX ORDER — BUPROPION HYDROCHLORIDE 450 MG/1
TABLET, FILM COATED, EXTENDED RELEASE ORAL
COMMUNITY
Start: 2024-11-09

## 2024-12-20 RX ORDER — KETOROLAC TROMETHAMINE 15 MG/ML
15 INJECTION, SOLUTION INTRAMUSCULAR; INTRAVENOUS ONCE
Status: COMPLETED | OUTPATIENT
Start: 2024-12-20 | End: 2024-12-20

## 2024-12-20 RX ADMIN — KETOROLAC TROMETHAMINE 15 MG: 15 INJECTION, SOLUTION INTRAMUSCULAR; INTRAVENOUS at 18:22

## 2024-12-20 ASSESSMENT — FIBROSIS 4 INDEX: FIB4 SCORE: 0.92

## 2024-12-21 NOTE — PROGRESS NOTES
Chief Complaint   Patient presents with    Back Pain     Lower back grisel, x3days          Back Pain  This is a new problem. The current episode started in the past 4-5 days. The problem occurs constantly. The problem is unchanged. The pain is present in the lumbar spine. The quality of the pain is described as sharp. The pain does radiate down LEFT  leg. The pain is moderate. The symptoms are aggravated by position. Pertinent negatives include no bladder incontinence, bowel incontinence, dysuria, fever, headaches,  or weakness.  also has some leg tingling/numbness.   Treatments tried: motrin.  The treatment provided no relief.      Family hx was reviewed - no pertinent past family hx         Past Medical History:   Diagnosis Date    Backache S/P L4-5    decompressive laminectomies with bilateral foramionotomies    Bipolar I disorder, most recent episode (or current) unspecified     untreated      Cough     Displacement of lumbar intervertebral disc without myelopathy     right sided sciatica    Dizziness and giddiness     Irregular menstrual cycle     menorrhagia lifelong    Lumbar pain  MRI   9/2008    MRI (9/2008) Left lateral disk protusion L5-S1 with left S1 root compression,small central disk  protusion at the L4-L5 with effacement of the ventral sac and small radial tear of the annulus as well as mild right lateral disk bulge at L4-L5 with neuro foraminal stenosis.    Need for Td vaccine  needed    Osteoarthrosis involving, or with mention of more than one site, but not specified as generalized, multiple sites     Pap smear  1990    Pneumonia hx.    Screening mammogram  1998    Tobacco use disorder          Social History     Tobacco Use    Smoking status: Every Day     Current packs/day: 0.25     Average packs/day: 0.3 packs/day for 30.0 years (7.5 ttl pk-yrs)     Types: Cigarettes    Smokeless tobacco: Never   Vaping Use    Vaping status: Never Used   Substance Use Topics    Alcohol use: Yes     Comment:  ocassional    Drug use: No     Comment: none currently but in the past did.             Review of Systems   Constitutional: Negative for fever, chills and malaise/fatigue.   Eyes: Negative for vision changes, d/c.    Respiratory: Negative for cough and sputum production.    Cardiovascular: Negative for chest pain and palpitations.   Gastrointestinal: Negative for nausea, vomiting, abdominal pain, diarrhea and constipation.   Genitourinary: Negative for dysuria, urgency and frequency.   Skin: Negative for rash or  itching.   Neurological: Negative for dizziness and headaches.   Psychiatric/Behavioral: Negative for depression.   Hematologic/lymphatic - denies bruising or excessive bleeding  All other systems reviewed and are negative.         Objective:     BP (!) 170/90   Pulse 95   Temp 37.1 °C (98.8 °F) (Temporal)   Resp 12   Wt 65.8 kg (145 lb)   SpO2 95%       Physical Exam   Constitutional: pt is oriented to person, place, and time. Pt appears well-developed and well-nourished. No distress.   HENT:   Head: Normocephalic and atraumatic.   Eyes: EOM are normal. Pupils are equal, round, and reactive to light. No scleral icterus.   Neck: Normal range of motion. Neck supple. No thyromegaly present.   Cardiovascular: Normal rate, regular rhythm and normal heart sounds.    Pulmonary/Chest: Effort normal and breath sounds normal. No respiratory distress.  no wheezes.   no rales.  no tenderness.   Musculoskeletal: pt exhibits no edema.                  Lumbar spine: pt exhibits decreased range of motion, spasm and right sided tenderness . Pt exhibits no bony tenderness, no swelling, no edema, no deformity  .   Neurological: patient is alert and oriented to person, place, and time. Patient has normal reflexes. No cranial nerve deficit. Patient exhibits normal muscle tone.   Reflex Scores:       Patellar reflexes are 2+ on the right side and 2+ on the left side.  STRAIGHT LEG RAISE POSITIVE ON LEFT  Skin: Skin is warm  and dry. No rash noted. No erythema.   Psychiatric: patient has a normal mood and affect.  behavior is normal.   Nursing note and vitals reviewed.               Assessment/Plan:       1. Lumbar radiculopathy     - methylPREDNISolone (MEDROL DOSEPAK) 4 MG Tablet Therapy Pack; Follow schedule on package instructions.  Dispense: 21 Tablet; Refill: 0  - diclofenac sodium (VOLTAREN) 1 % Gel; Apply 4 g topically every 6 hours as needed (PAIN).  Dispense: 50 g; Refill: 0  - cyclobenzaprine (FLEXERIL) 5 mg tablet; Take 1-2 Tablets by mouth every 8 hours as needed for Muscle Spasms.  Dispense: 30 Tablet; Refill: 0  - HYDROcodone-acetaminophen (NORCO) 5-325 MG Tab per tablet; Take 1 Tablet by mouth every 8 hours as needed (pain) for up to 7 days.  Dispense: 10 Tablet; Refill: 0  - ketorolac (Toradol) 15 MG/ML injection 15 mg  - MR-LUMBAR SPINE-W/O; Future    Narcotic use report obtained with no indication of narcotic overuse or misuse and deemed necessary for treaatment. Sedation, dependence, and constipation precautions given. Avoid use while driving or operating heavy machinery.       #2. HTN  Not at goal  Likely elevated due to pain  Advised f/u PCP     Differential diagnosis, natural history, supportive care, and indications for immediate follow-up discussed. All questions answered. Patient agrees with the plan of care.     Follow-up as needed if symptoms worsen or fail to improve to PCP, Urgent care or Emergency Room.     I have personally reviewed prior external notes and test results pertinent to today's visit.  I have independently reviewed and interpreted all diagnostics ordered during this urgent care acute visit.

## 2024-12-27 ENCOUNTER — OFFICE VISIT (OUTPATIENT)
Dept: URGENT CARE | Facility: PHYSICIAN GROUP | Age: 54
End: 2024-12-27
Payer: MEDICAID

## 2024-12-27 ENCOUNTER — APPOINTMENT (OUTPATIENT)
Dept: RADIOLOGY | Facility: MEDICAL CENTER | Age: 54
End: 2024-12-27
Attending: FAMILY MEDICINE
Payer: MEDICAID

## 2024-12-27 VITALS
OXYGEN SATURATION: 97 % | BODY MASS INDEX: 26.04 KG/M2 | SYSTOLIC BLOOD PRESSURE: 140 MMHG | RESPIRATION RATE: 14 BRPM | WEIGHT: 147 LBS | HEART RATE: 89 BPM | DIASTOLIC BLOOD PRESSURE: 92 MMHG | TEMPERATURE: 97 F

## 2024-12-27 DIAGNOSIS — R03.0 ELEVATED BLOOD PRESSURE READING: ICD-10-CM

## 2024-12-27 DIAGNOSIS — G44.211 INTRACTABLE EPISODIC TENSION-TYPE HEADACHE: ICD-10-CM

## 2024-12-27 PROCEDURE — 99213 OFFICE O/P EST LOW 20 MIN: CPT

## 2024-12-27 PROCEDURE — 3080F DIAST BP >= 90 MM HG: CPT

## 2024-12-27 PROCEDURE — 3077F SYST BP >= 140 MM HG: CPT

## 2024-12-27 RX ORDER — KETOROLAC TROMETHAMINE 15 MG/ML
15 INJECTION, SOLUTION INTRAMUSCULAR; INTRAVENOUS ONCE
Status: COMPLETED | OUTPATIENT
Start: 2024-12-27 | End: 2024-12-27

## 2024-12-27 RX ORDER — BUTALBITAL, ACETAMINOPHEN AND CAFFEINE 50; 325; 40 MG/1; MG/1; MG/1
1 TABLET ORAL EVERY 4 HOURS PRN
Qty: 30 TABLET | Refills: 0 | Status: SHIPPED | OUTPATIENT
Start: 2024-12-27 | End: 2025-01-16

## 2024-12-27 RX ADMIN — KETOROLAC TROMETHAMINE 15 MG: 15 INJECTION, SOLUTION INTRAMUSCULAR; INTRAVENOUS at 14:52

## 2024-12-27 ASSESSMENT — FIBROSIS 4 INDEX: FIB4 SCORE: 0.92

## 2024-12-27 ASSESSMENT — ENCOUNTER SYMPTOMS: HEADACHES: 1

## 2024-12-27 NOTE — LETTER
[facility]  [facility] [address] [address]     December 27, 2024    Patient: Kristina Davis   YOB: 1970   Date of Visit: 12/27/2024       To Whom It May Concern:    Kristina Davis was seen and treated in our department on 12/27/2024. Please excuse absence from work due to illness starting 12/26/24 through 12/30/24.    Sincerely,     FLOYD Rangel.

## 2024-12-27 NOTE — PROGRESS NOTES
Subjective     Kristina Davis is a 54 y.o. female who presents with Migraine and Nausea            Headache  Headache pattern:  Headache sometimes there, sometimes not at all  Recent changes:  Headaches come more often than they used to and headaches last longer than they used to  Frequency:  1 to 3 per month  Providers seen:  None  Longest time without a headache:  Days  Number of ER visits for headache:  0  Number of hospitalizations for headaches:  0  Time of day symptoms are worse:  No specific time of day  Do headaches wake patient from sleep?: Yes    Laterality:  Both sides at the same time  Location:  Temples/sides and behind eyes  Pain severity:  5  Headaches last more than three days?: No    Changes in thinking and mood:  Fatigue  Changes in vision:  None  Changes in sensation:  Sensitivity to light and sensitivity to sound  Abortive medications tried:  Ibuprofen and acetaminophen  Preventative medications tried:  None      Review of Systems   Constitutional:  Negative for chills and fever.   HENT:  Negative for tinnitus.    Eyes:  Positive for photophobia. Negative for blurred vision, double vision and pain.   Respiratory:  Negative for cough, shortness of breath and wheezing.    Cardiovascular:  Negative for chest pain and palpitations.   Gastrointestinal:  Negative for abdominal pain, diarrhea, nausea and vomiting.   Musculoskeletal:  Negative for neck pain.   Skin:  Negative for rash.   Neurological:  Positive for headaches. Negative for dizziness, speech change, focal weakness and weakness.              Objective     BP (!) 140/92   Pulse 89   Temp 36.1 °C (97 °F) (Temporal)   Resp 14   Wt 66.7 kg (147 lb)   LMP 08/01/2013   SpO2 97%   BMI 26.04 kg/m²      Physical Exam  Constitutional:       General: She is not in acute distress.     Appearance: Normal appearance. She is not ill-appearing.   HENT:      Head: Normocephalic and atraumatic.      Right Ear: Tympanic membrane is not  erythematous.      Left Ear: Tympanic membrane is not erythematous.      Nose: No congestion.      Mouth/Throat:      Mouth: Mucous membranes are moist.   Eyes:      Extraocular Movements: Extraocular movements intact.      Conjunctiva/sclera: Conjunctivae normal.      Pupils: Pupils are equal, round, and reactive to light.   Cardiovascular:      Rate and Rhythm: Normal rate and regular rhythm.   Pulmonary:      Effort: Pulmonary effort is normal. No respiratory distress.      Breath sounds: No stridor. No wheezing.   Musculoskeletal:         General: Normal range of motion.   Skin:     General: Skin is dry.   Neurological:      General: No focal deficit present.      Mental Status: She is alert and oriented to person, place, and time. Mental status is at baseline.      Motor: No weakness.              Assessment & Plan     This is a subacute condition.  Kristina is a pleasant 54-year old female presenting to clinic today with complaints of a headache starting 2 days ago.  Patient reports headache is located in temples and behind the eyes, and has gotten progressively worse despite Tylenol and Ibuprofen.  She also reports nausea with one episode of vomiting related to the pain, as well as light and sound sensitivity.  She denies current URI symptoms, nuchal rigidity, fever, or rash.  Patient denies diagnosed migraines, but reports history of HTN and current 1/2 pack daily smoker for 30 years.   Assessment & Plan  Intractable episodic tension-type headache    Orders:    ketorolac (Toradol) 15 MG/ML injection 15 mg    butalbital/apap/caffeine (FIORICET) -40 mg Tab; Take 1 Tablet by mouth every four hours as needed for Headache for up to 20 days.    Pmhx, previous visits, and vitals reviewed.  Similar complaints were evaluated and treated for 11/5/24.   Negative neuro exam.  No s/s of acute GI concerns.  Headache likely chronic tension type vs undiagnosed migraines.    Will treat with Fioricet q4 prn and Ketorolac x1  in clinic.  Encouraged patient to increase fluid intake.    Elevated blood pressure reading    This is a chronic finding.  Informed patient headache could be related to uncontrolled blood pressure, and that she should keep a daily blood pressure log and present to PCP at next appointment.  PCP can also address chronic migraines if headache episodes persist.    Instructed patient that prolonged blood pressure and smoking can lead to increased risk of CVA or ACS.  Educated on concerning neurologic, cardiac, and pulmonary symptoms, and when to report to ED.      Patient understands and is agreeable to treatment plan.  Denies further questions.

## 2024-12-28 ASSESSMENT — ENCOUNTER SYMPTOMS
VOMITING: 0
FOCAL WEAKNESS: 0
NAUSEA: 0
EYE PAIN: 0
ABDOMINAL PAIN: 0
CHILLS: 0
DIZZINESS: 0
DIARRHEA: 0
NECK PAIN: 0
BLURRED VISION: 0
SHORTNESS OF BREATH: 0
SPEECH CHANGE: 0
FEVER: 0
WEAKNESS: 0
PALPITATIONS: 0
COUGH: 0
WHEEZING: 0
DOUBLE VISION: 0
PHOTOPHOBIA: 1

## 2024-12-29 ENCOUNTER — HOSPITAL ENCOUNTER (OUTPATIENT)
Dept: RADIOLOGY | Facility: MEDICAL CENTER | Age: 54
End: 2024-12-29
Attending: FAMILY MEDICINE
Payer: MEDICAID

## 2024-12-29 DIAGNOSIS — M54.16 LUMBAR RADICULOPATHY: ICD-10-CM

## 2024-12-29 PROCEDURE — 72148 MRI LUMBAR SPINE W/O DYE: CPT

## 2025-01-03 DIAGNOSIS — M54.16 LUMBAR RADICULOPATHY: ICD-10-CM

## 2025-01-03 RX ORDER — HYDROCODONE BITARTRATE AND ACETAMINOPHEN 5; 325 MG/1; MG/1
1 TABLET ORAL EVERY 8 HOURS PRN
Qty: 5 TABLET | Refills: 0 | Status: SHIPPED | OUTPATIENT
Start: 2025-01-03 | End: 2025-01-10

## 2025-02-12 ENCOUNTER — APPOINTMENT (OUTPATIENT)
Dept: MEDICAL GROUP | Facility: CLINIC | Age: 55
End: 2025-02-12
Payer: MEDICAID

## 2025-02-20 ENCOUNTER — OFFICE VISIT (OUTPATIENT)
Dept: URGENT CARE | Facility: PHYSICIAN GROUP | Age: 55
End: 2025-02-20
Payer: MEDICAID

## 2025-02-20 VITALS
DIASTOLIC BLOOD PRESSURE: 80 MMHG | WEIGHT: 149 LBS | RESPIRATION RATE: 16 BRPM | SYSTOLIC BLOOD PRESSURE: 168 MMHG | OXYGEN SATURATION: 98 % | TEMPERATURE: 98.6 F | HEART RATE: 87 BPM | BODY MASS INDEX: 26.4 KG/M2 | HEIGHT: 63 IN

## 2025-02-20 DIAGNOSIS — I10 PRIMARY HYPERTENSION: ICD-10-CM

## 2025-02-20 DIAGNOSIS — M54.16 LUMBAR RADICULOPATHY: ICD-10-CM

## 2025-02-20 DIAGNOSIS — M54.6 ACUTE LEFT-SIDED THORACIC BACK PAIN: ICD-10-CM

## 2025-02-20 PROCEDURE — 99213 OFFICE O/P EST LOW 20 MIN: CPT

## 2025-02-20 PROCEDURE — 3078F DIAST BP <80 MM HG: CPT

## 2025-02-20 PROCEDURE — 3077F SYST BP >= 140 MM HG: CPT

## 2025-02-20 RX ORDER — KETOROLAC TROMETHAMINE 15 MG/ML
15 INJECTION, SOLUTION INTRAMUSCULAR; INTRAVENOUS ONCE
Status: COMPLETED | OUTPATIENT
Start: 2025-02-20 | End: 2025-02-20

## 2025-02-20 RX ORDER — CYCLOBENZAPRINE HCL 5 MG
5-10 TABLET ORAL EVERY 8 HOURS PRN
Qty: 60 TABLET | Refills: 0 | Status: SHIPPED | OUTPATIENT
Start: 2025-02-20 | End: 2025-03-02

## 2025-02-20 RX ORDER — BUPROPION HYDROCHLORIDE 300 MG/1
TABLET ORAL
COMMUNITY
Start: 2025-01-03

## 2025-02-20 RX ORDER — CLONAZEPAM 0.25 MG/1
TABLET, ORALLY DISINTEGRATING ORAL
COMMUNITY
Start: 2025-01-12

## 2025-02-20 RX ADMIN — KETOROLAC TROMETHAMINE 15 MG: 15 INJECTION, SOLUTION INTRAMUSCULAR; INTRAVENOUS at 17:15

## 2025-02-20 ASSESSMENT — FIBROSIS 4 INDEX: FIB4 SCORE: 0.92

## 2025-02-20 ASSESSMENT — ENCOUNTER SYMPTOMS: BACK PAIN: 1

## 2025-02-20 NOTE — LETTER
[facility]  [facility] [address] [address]     February 20, 2025    Patient: Kristina Davis   YOB: 1970   Date of Visit: 2/20/2025       To Whom It May Concern:    Kristina Davis was seen and treated in our department on 2/20/2025. Please excuse absence from work due to illness starting 2/20/25 through 2/21/25.    Sincerely,     FLOYD Rangel.

## 2025-02-21 NOTE — PROGRESS NOTES
"Subjective     Kristina Davis is a 54 y.o. female who presents with Back Pain (Pt states she has a nerve block 2/19/25.)            Back Pain  This is a new problem. The current episode started yesterday. The problem has been gradually worsening since onset. The pain is present in the thoracic spine. The quality of the pain is described as aching. Pertinent negatives include no abdominal pain, chest pain, fever, headaches or weakness.       Review of Systems   Constitutional:  Negative for chills and fever.   Respiratory:  Negative for cough, shortness of breath and wheezing.    Cardiovascular:  Negative for chest pain and palpitations.   Gastrointestinal:  Negative for abdominal pain, diarrhea, nausea and vomiting.   Musculoskeletal:  Positive for back pain.   Neurological:  Negative for dizziness, weakness and headaches.              Objective     BP (!) 168/80   Pulse 87   Temp 37 °C (98.6 °F) (Temporal)   Resp 16   Ht 1.6 m (5' 3\")   Wt 67.6 kg (149 lb)   LMP 08/01/2013   SpO2 98%   BMI 26.39 kg/m²      Physical Exam  Constitutional:       General: She is not in acute distress.     Appearance: Normal appearance. She is not ill-appearing.   HENT:      Head: Normocephalic and atraumatic.      Right Ear: Tympanic membrane is not erythematous.      Left Ear: Tympanic membrane is not erythematous.      Nose: No congestion.      Mouth/Throat:      Mouth: Mucous membranes are moist.   Eyes:      Extraocular Movements: Extraocular movements intact.      Conjunctiva/sclera: Conjunctivae normal.      Pupils: Pupils are equal, round, and reactive to light.   Cardiovascular:      Rate and Rhythm: Normal rate and regular rhythm.   Pulmonary:      Effort: Pulmonary effort is normal. No respiratory distress.      Breath sounds: No stridor. No wheezing.   Musculoskeletal:         General: Normal range of motion.      Cervical back: Normal range of motion and neck supple. No rigidity or tenderness.      Thoracic " "back: Tenderness present. No edema or deformity. Normal range of motion.        Back:       Comments: Tenderness to left thoracic muscles proximal to bilateral lumbar injection sites.  No deformity or bony tenderness along spinal column.  Patient reports chronic numbness/tingling to BLE without incontinence, gait disturbance, or fever.  Full ROM despite pain.    Skin:     General: Skin is dry.   Neurological:      General: No focal deficit present.      Mental Status: She is alert and oriented to person, place, and time. Mental status is at baseline.      Motor: No weakness.                           Assessment & Plan  Acute left-sided thoracic back pain    Orders:    ketorolac (Toradol) 15 MG/ML injection 15 mg    This is an acute condition.  Previous visits, pmhx, medication, and VS reviewed.  Patient not acutely ill appearing or in acute distress.  She has been seen frequently for lumbar radiculopathy for which she has been referred to Ortho and [facility].  She reports she received an injected nerve block yesterday and developed muscular pain above the site.    No red flag symptoms on exam.  Patient has not reached out to provider that offered injection, and is requesting medication for acute pain.  I encouraged patient to refer to [facility], though this is less likely a drug reaction, and likely an exacerbation of chronic back pain.  Short course of Flexeril per patient request, as this has worked for her with acute spasms in the past.    Ketorolac x1 IM  She is encouraged to call pain and ortho providers tomorrow for further evaluation and treatment.  ER for severe ortho symptoms.   Lumbar radiculopathy    Orders:    cyclobenzaprine (FLEXERIL) 5 mg tablet; Take 1-2 Tablets by mouth every 8 hours as needed for Muscle Spasms for up to 10 days.    Primary hypertension    This is a chronic condition.  Patient asymptomatic.  She is reporting her blood pressure is \"controlled at home\", and that she " monitors it daily.  I educated patient on risk of prolonged untreated hypertension despite report of compliance with Losartan.    She states her blood pressure is elevated due to acute pain.  While this is likely, I encouraged patient to seek blood pressure treatment in the ER, as they have more efficient resources.  She is refusing ER at this time.  Strict ER precautions noted for any cardiac, pulmonary, and neurologic changes.    Patient understands and is agreeable to treatment plan.  Denies further questions.            Pain management [facility] nerve block yesterday  Ablation for nerves schedule 3/12

## 2025-02-23 ASSESSMENT — ENCOUNTER SYMPTOMS
FEVER: 0
CHILLS: 0
ABDOMINAL PAIN: 0
DIZZINESS: 0
SHORTNESS OF BREATH: 0
PALPITATIONS: 0
VOMITING: 0
NAUSEA: 0
WHEEZING: 0
HEADACHES: 0
DIARRHEA: 0
COUGH: 0
WEAKNESS: 0

## 2025-03-03 ENCOUNTER — OFFICE VISIT (OUTPATIENT)
Dept: URGENT CARE | Facility: PHYSICIAN GROUP | Age: 55
End: 2025-03-03
Payer: MEDICAID

## 2025-03-03 VITALS
RESPIRATION RATE: 14 BRPM | TEMPERATURE: 97.5 F | DIASTOLIC BLOOD PRESSURE: 98 MMHG | OXYGEN SATURATION: 96 % | BODY MASS INDEX: 26.04 KG/M2 | HEART RATE: 103 BPM | WEIGHT: 147 LBS | SYSTOLIC BLOOD PRESSURE: 132 MMHG

## 2025-03-03 DIAGNOSIS — F41.9 ANXIETY: ICD-10-CM

## 2025-03-03 DIAGNOSIS — Z75.8 DOES NOT HAVE PRIMARY CARE PROVIDER: ICD-10-CM

## 2025-03-03 DIAGNOSIS — R11.0 NAUSEA: ICD-10-CM

## 2025-03-03 DIAGNOSIS — R03.0 ELEVATED BLOOD PRESSURE READING: ICD-10-CM

## 2025-03-03 PROCEDURE — 3075F SYST BP GE 130 - 139MM HG: CPT

## 2025-03-03 PROCEDURE — 99213 OFFICE O/P EST LOW 20 MIN: CPT

## 2025-03-03 PROCEDURE — 3080F DIAST BP >= 90 MM HG: CPT

## 2025-03-03 RX ORDER — ONDANSETRON 4 MG/1
4 TABLET, ORALLY DISINTEGRATING ORAL EVERY 6 HOURS PRN
Qty: 15 TABLET | Refills: 0 | Status: SHIPPED | OUTPATIENT
Start: 2025-03-03

## 2025-03-03 ASSESSMENT — FIBROSIS 4 INDEX: FIB4 SCORE: 0.92

## 2025-03-03 NOTE — LETTER
[facility]  [facility] [address] [address]     March 3, 2025    Patient: Kristina Davis   YOB: 1970   Date of Visit: 3/3/2025       To Whom It May Concern:    Kristina Davis was seen and treated in our department on 3/3/2025. Please excuse absence from work due to illness starting 3/3/25 through 3/4/25.    Sincerely,     FLOYD Rangel.

## 2025-03-04 NOTE — PROGRESS NOTES
Subjective     Kristina Davis is a 54 y.o. female who presents with Anxiety            Anxiety  Presents for initial visit. Onset was in the past 7 days. The problem has been gradually worsening. Symptoms include excessive worry, nausea and nervous/anxious behavior. Patient reports no chest pain, dizziness, hyperventilation, palpitations, panic, restlessness, shortness of breath or suicidal ideas. Symptoms occur most days. The severity of symptoms is moderate. Exacerbated by: Recent loss of dog, and work related stress. The quality of sleep is poor.     Her past medical history is significant for anxiety/panic attacks and bipolar disorder. Past treatments include benzodiazephines, non-benzodiazephine anxiolytics, non-SSRI antidepressants and counseling (CBT). The treatment provided no relief. Compliance with prior treatments has been good.       Review of Systems   Respiratory:  Negative for shortness of breath.    Cardiovascular:  Negative for chest pain and palpitations.   Gastrointestinal:  Positive for nausea. Negative for vomiting.   Neurological:  Negative for dizziness.   Psychiatric/Behavioral:  Negative for depression and suicidal ideas. The patient is nervous/anxious.               Objective     BP (!) 132/98   Pulse (!) 103   Temp 36.4 °C (97.5 °F) (Temporal)   Resp 14   Wt 66.7 kg (147 lb)   LMP 08/01/2013   SpO2 96%   BMI 26.04 kg/m²      Physical Exam  Constitutional:       General: She is not in acute distress.     Appearance: Normal appearance. She is not ill-appearing.   HENT:      Head: Normocephalic and atraumatic.      Right Ear: Tympanic membrane is not erythematous.      Left Ear: Tympanic membrane is not erythematous.      Nose: No congestion.      Mouth/Throat:      Mouth: Mucous membranes are moist.   Eyes:      Extraocular Movements: Extraocular movements intact.      Conjunctiva/sclera: Conjunctivae normal.      Pupils: Pupils are equal, round, and reactive to light.    Cardiovascular:      Rate and Rhythm: Regular rhythm. Tachycardia present.   Pulmonary:      Effort: Pulmonary effort is normal. No respiratory distress.      Breath sounds: No stridor. No wheezing.   Musculoskeletal:         General: Normal range of motion.   Skin:     General: Skin is warm and dry.   Neurological:      General: No focal deficit present.      Mental Status: She is alert and oriented to person, place, and time. Mental status is at baseline.      Motor: No weakness.   Psychiatric:         Mood and Affect: Mood normal.         Behavior: Behavior normal.         Thought Content: Thought content normal.                 Assessment & Plan  Anxiety  This is an acute on chronic condition.  Previous visits, pmhx, medication, and VS reviewed.  Patient not acutely ill appearing or in acute distress.  Mild tachycardia and hypertension.  Patient asymptomatic.  Reports recent anxiety episode starting within one week after she had to put her elderly dog down.  She has a pmhx of anxiety and bipolar, for which she reports compliance with her medication.  She denies panic attacks, insomnia, or SI/HI.  Patient sees a tele psychiatrist that often consults her for anxiety episodes.  I encouraged patient to continue scheduled daily and prn SNRI and benzo, and that new medication is not warranted at this time, due to acute life event.   Patient reports she feels safe in home.  Work note provided to allow time for patient to rest.  Patient to report to ER/call EMS if she develops thoughts of self harm or harm to others.    Nausea    Orders:    ondansetron (ZOFRAN ODT) 4 MG TABLET DISPERSIBLE; Take 1 Tablet by mouth every 6 hours as needed for Nausea/Vomiting for up to 15 doses.  Physical exam benign, no c/f acute red flag abd or  symptoms.  Zofran prn offered for associating symptom of nausea.  She denies vomiting, abd pain, or diarrhea.  Small frequent meals encouraged.  Increased fluid intake and electrolytes also  discussed.  Return to clinic if symptoms do not improve or worsen within 5-7 days.   ER for severe symptoms.  Does not have primary care provider    Orders:    Referral to establish with PCP    Elevated blood pressure reading    Patient reports this is not a new finding, and has improved since previous visits.  Instructed patient to attempt nonpharmacologic treatment of unresolved elevated blood pressure such as diet and exercise modifications.  Get adequate sleep, and manage stress.  PCP referral for further evaluation and treatment.  ER for any neuro, cardiac, or pulmonary changes.  Advised of risk of not doing so.    Patient understands and is agreeable to treatment plan.  Denies further questions.

## 2025-03-04 NOTE — ASSESSMENT & PLAN NOTE
This is an acute on chronic condition.  Previous visits, pmhx, medication, and VS reviewed.  Patient not acutely ill appearing or in acute distress.  Mild tachycardia and hypertension.  Patient asymptomatic.  Reports recent anxiety episode starting within one week after she had to put her elderly dog down.  She has a pmhx of anxiety and bipolar, for which she reports compliance with her medication.  She denies panic attacks, insomnia, or SI/HI.  Patient sees a tele psychiatrist that often consults her for anxiety episodes.  I encouraged patient to continue scheduled daily and prn SNRI and benzo, and that new medication is not warranted at this time, due to acute life event.   Patient reports she feels safe in home.  Work note provided to allow time for patient to rest.  Patient to report to ER/call EMS if she develops thoughts of self harm or harm to others.

## 2025-03-06 ASSESSMENT — ENCOUNTER SYMPTOMS
VOMITING: 0
RESTLESSNESS: 0
HYPERVENTILATION: 0
NERVOUS/ANXIOUS: 1
PANIC: 0
SHORTNESS OF BREATH: 0
NAUSEA: 1
PALPITATIONS: 0
DIZZINESS: 0
DEPRESSION: 0

## 2025-03-14 ENCOUNTER — HOSPITAL ENCOUNTER (OUTPATIENT)
Dept: RADIOLOGY | Facility: MEDICAL CENTER | Age: 55
End: 2025-03-14

## 2025-03-14 ENCOUNTER — OFFICE VISIT (OUTPATIENT)
Dept: URGENT CARE | Facility: PHYSICIAN GROUP | Age: 55
End: 2025-03-14
Payer: MEDICAID

## 2025-03-14 VITALS
SYSTOLIC BLOOD PRESSURE: 160 MMHG | DIASTOLIC BLOOD PRESSURE: 88 MMHG | RESPIRATION RATE: 16 BRPM | WEIGHT: 153 LBS | BODY MASS INDEX: 27.11 KG/M2 | HEART RATE: 89 BPM | TEMPERATURE: 98 F | OXYGEN SATURATION: 99 % | HEIGHT: 63 IN

## 2025-03-14 DIAGNOSIS — R07.89 MUSCULOSKELETAL CHEST PAIN: ICD-10-CM

## 2025-03-14 PROCEDURE — 3079F DIAST BP 80-89 MM HG: CPT | Performed by: FAMILY MEDICINE

## 2025-03-14 PROCEDURE — 3077F SYST BP >= 140 MM HG: CPT | Performed by: FAMILY MEDICINE

## 2025-03-14 PROCEDURE — 99214 OFFICE O/P EST MOD 30 MIN: CPT | Performed by: FAMILY MEDICINE

## 2025-03-14 RX ORDER — PREDNISONE 20 MG/1
TABLET ORAL
Qty: 15 TABLET | Refills: 0 | Status: SHIPPED | OUTPATIENT
Start: 2025-03-14 | End: 2025-03-24

## 2025-03-14 RX ORDER — CYCLOBENZAPRINE HCL 10 MG
TABLET ORAL
Qty: 30 TABLET | Refills: 0 | Status: SHIPPED | OUTPATIENT
Start: 2025-03-14

## 2025-03-14 RX ORDER — HYDROCODONE BITARTRATE AND ACETAMINOPHEN 5; 325 MG/1; MG/1
TABLET ORAL
Qty: 20 TABLET | Refills: 0 | Status: SHIPPED | OUTPATIENT
Start: 2025-03-14 | End: 2025-03-19

## 2025-03-14 ASSESSMENT — FIBROSIS 4 INDEX: FIB4 SCORE: 0.92

## 2025-03-15 NOTE — PROGRESS NOTES
Chief Complaint:    Chief Complaint   Patient presents with    Follow-Up     Was seen at [facility] last night after moving a bed and left a pop in her chest. Pt states she's still having pain       History of Present Illness:    Significant pain in upper chest, midline. She was moving a bed and was pulling it back and felt a pop and pain in her chest. She went to [facility] yesterday and reports EKG, CXR, and CT imaging were done, all which were non-revealing for cause of her symptoms. She reports she was told she could have a hairline fracture though. She reports was given Toradol, Fentanyl, and Percocet in the ER and feels the Toradol did not help at all. She reports the ER was supposed to prescribe some medication for her, but the pharmacy did not have any meds for her there.      Past Medical History:    Past Medical History:   Diagnosis Date    Backache S/P L4-5    decompressive laminectomies with bilateral foramionotomies    Bipolar I disorder, most recent episode (or current) unspecified     untreated      Cough     Displacement of lumbar intervertebral disc without myelopathy     right sided sciatica    Dizziness and giddiness     Irregular menstrual cycle     menorrhagia lifelong    Lumbar pain  MRI   9/2008    MRI (9/2008) Left lateral disk protusion L5-S1 with left S1 root compression,small central disk  protusion at the L4-L5 with effacement of the ventral sac and small radial tear of the annulus as well as mild right lateral disk bulge at L4-L5 with neuro foraminal stenosis.    Need for Td vaccine  needed    Osteoarthrosis involving, or with mention of more than one site, but not specified as generalized, multiple sites     Pap smear  1990    Pneumonia hx.    Screening mammogram  1998    Tobacco use disorder      Past Surgical History:    Past Surgical History:   Procedure Laterality Date    LUMBAR LAMINECTOMY DISKECTOMY  3/18/2009    Performed by RIP GAMA at SURGERY [facility] ORS    TUBAL  COAGULATION LAPAROSCOPIC BILATERAL       Social History:    Social History     Socioeconomic History    Marital status: Other     Spouse name: Not on file    Number of children: Not on file    Years of education: Not on file    Highest education level: Some college, no degree   Occupational History    Not on file   Tobacco Use    Smoking status: Every Day     Current packs/day: 0.25     Average packs/day: 0.3 packs/day for 30.0 years (7.5 ttl pk-yrs)     Types: Cigarettes    Smokeless tobacco: Never   Vaping Use    Vaping status: Never Used   Substance and Sexual Activity    Alcohol use: Yes     Comment: ocassional    Drug use: No     Comment: none currently but in the past did.    Sexual activity: Not Currently     Comment:    Other Topics Concern    Not on file   Social History Narrative    Not on file     Social Drivers of Health     Financial Resource Strain: High Risk (1/15/2023)    Overall Financial Resource Strain (CARDIA)     Difficulty of Paying Living Expenses: Hard   Food Insecurity: Food Insecurity Present (1/15/2023)    Hunger Vital Sign     Worried About Running Out of Food in the Last Year: Sometimes true     Ran Out of Food in the Last Year: Never true   Transportation Needs: No Transportation Needs (1/15/2023)    PRAPARE - Transportation     Lack of Transportation (Medical): No     Lack of Transportation (Non-Medical): No   Physical Activity: Sufficiently Active (1/15/2023)    Exercise Vital Sign     Days of Exercise per Week: 3 days     Minutes of Exercise per Session: 150+ min   Stress: Stress Concern Present (1/15/2023)    [employer] [place] [employer] - Occupational Stress Questionnaire     Feeling of Stress : Rather much   Social Connections: Socially Isolated (1/15/2023)    Social Connection and Isolation Panel [NHANES]     Frequency of Communication with Friends and Family: Once a week     Frequency of Social Gatherings with Friends and Family: Never     Attends Islam  Services: Never     Active Member of Clubs or Organizations: No     Attends Club or Organization Meetings: Never     Marital Status:    Intimate Partner Violence: Not on file   Housing Stability: High Risk (1/15/2023)    Housing Stability Vital Sign     Unable to Pay for Housing in the Last Year: Yes     Number of Places Lived in the Last Year: 3     Unstable Housing in the Last Year: No     Family History:    Family History   Problem Relation Age of Onset    Cancer Mother     Diabetes Maternal Grandmother     Cancer Maternal Grandmother      Medications:    Current Outpatient Medications on File Prior to Visit   Medication Sig Dispense Refill    ondansetron (ZOFRAN ODT) 4 MG TABLET DISPERSIBLE Take 1 Tablet by mouth every 6 hours as needed for Nausea/Vomiting for up to 15 doses. 15 Tablet 0    buPROPion (WELLBUTRIN XL) 300 MG XL tablet       desvenlafaxine succinate (PRISTIQ) 50 MG TABLET SR 24 HR TAKE 1 TABLET BY MOUTH ONCE DAILY IN THE MORNING FOR DEPRESSION      FORFIVO  MG TABLET SR 24 HR TAKE 1 TABLET BY MOUTH ONCE DAILY IN THE MORNING FOR 30 DAYS , FOR LOW ENERGY, LOW MOTIVATION      metFORMIN ER (GLUCOPHAGE XR) 500 MG TABLET SR 24 HR Take 500 mg by mouth every day.      losartan (COZAAR) 50 MG Tab Take 50 mg by mouth every day.      CAPLYTA 21 MG Cap TAKE 1 CAPSULE BY MOUTH ONCE DAILY AT BEDTIME FOR 30 DAYS FOR BIPOLAR.      hydrOXYzine HCl (ATARAX) 25 MG Tab TAKE 1 TABLET BY MOUTH THREE TIMES DAILY FOR 20 DAYS AS NEEDED FOR ANXIETY      clonazePAM (KLONOPIN) 0.5 MG Tab TAKE 1 TABLET BY MOUTH EVERY DAY FOR 15 DAYS AS NEEDED FOR PANIC ATTACKS      gabapentin (NEURONTIN) 300 MG Cap Take 300 mg by mouth 3 times a day.      meclizine (ANTIVERT) 25 MG Tab Take 1 Tab by mouth 3 times a day as needed for Vertigo. 30 Tab 0     No current facility-administered medications on file prior to visit.     Allergies:    Allergies   Allergen Reactions    Sulfa Drugs Hives and Anaphylaxis     Causes angioderma  "and hives       Vitals:    Vitals:    25 1902   BP: (!) 160/88   Pulse: 89   Resp: 16   Temp: 36.7 °C (98 °F)   TempSrc: Temporal   SpO2: 99%   Weight: 69.4 kg (153 lb)   Height: 1.6 m (5' 3\")       Physical Exam:    Constitutional: Vital signs reviewed. Appears well-developed and well-nourished. In very bothersome pain due to upper chest, midline.  Eyes: Sclera white, conjunctivae clear.   ENT: External ears normal. Hearing normal.   Neck: Neck supple.   Pulmonary/Chest: Respirations non-labored.   Musculoskeletal: Tender to very light palpation in the upper chest, midline.  Neurological: Alert and oriented to person, place, and time. Muscle tone normal. Coordination normal.   Skin: No rashes or lesions. Warm, dry, normal turgor.  Psychiatric: Judgment and thought content normal.       Assessment / Plan & Medical Decision Makin. Musculoskeletal chest pain  - predniSONE (DELTASONE) 20 MG Tab; 2 tabs by mouth once a day on days 1-5, 1 tab once a day on days 6-10. Take with food.  Dispense: 15 Tablet; Refill: 0  - cyclobenzaprine (FLEXERIL) 10 mg Tab; 1 tab by mouth every 8 hours only if needed for pain, muscle spasm, and/or muscle tightness. May cause drowsiness.  Dispense: 30 Tablet; Refill: 0  - HYDROcodone-acetaminophen (NORCO) 5-325 MG Tab per tablet; 1 tab by mouth every 6 hours only if needed for pain for up to 5 days. May cause drowsiness.  Dispense: 20 Tablet; Refill: 0       Discussed with her DDX, management options, and risks, benefits, and alternatives to treatment plan agreed upon.    Significant pain in upper chest, midline. She was moving a bed and was pulling it back and felt a pop and pain in her chest. She went to [facility] yesterday and reports EKG, CXR, and CT imaging were done, all which were non-revealing for cause of her symptoms. She reports she was told she could have a hairline fracture though. She reports was given Toradol, Fentanyl, and Percocet in the ER and feels the Toradol " did not help at all. She reports the ER was supposed to prescribe some medication for her, but the pharmacy did not have any meds for her there.    In very bothersome pain due to upper chest, midline. Tender to very light palpation in the upper chest, midline.    Complicated condition due to severity of symptoms despite evaluation and treatment at [facility] yesterday.    Agreeable to medication prescribed.  report checked - last Rx was Clonazepam 0.25 Mg Odt #30 filled on 1/12/25. Last narcotic Rx was Hydrocodone-Acetamin 5-325 Mg #5 on 1/3/25.    In my professional judgment, after considering each of the factors set forth in , the CS is medically necessary and appropriate.    Discussed expected course of duration, time for improvement, and to seek follow-up in Emergency Room, urgent care, or with PCP if getting worse at any time or not improving within expected time frame.

## 2025-03-18 ENCOUNTER — OFFICE VISIT (OUTPATIENT)
Dept: URGENT CARE | Facility: PHYSICIAN GROUP | Age: 55
End: 2025-03-18
Payer: MEDICAID

## 2025-03-18 VITALS
TEMPERATURE: 98.8 F | DIASTOLIC BLOOD PRESSURE: 88 MMHG | BODY MASS INDEX: 27.11 KG/M2 | HEART RATE: 77 BPM | SYSTOLIC BLOOD PRESSURE: 132 MMHG | HEIGHT: 63 IN | RESPIRATION RATE: 16 BRPM | OXYGEN SATURATION: 98 % | WEIGHT: 153 LBS

## 2025-03-18 DIAGNOSIS — R07.89 MUSCULOSKELETAL CHEST PAIN: ICD-10-CM

## 2025-03-18 PROCEDURE — 3079F DIAST BP 80-89 MM HG: CPT | Performed by: FAMILY MEDICINE

## 2025-03-18 PROCEDURE — 3075F SYST BP GE 130 - 139MM HG: CPT | Performed by: FAMILY MEDICINE

## 2025-03-18 PROCEDURE — 99213 OFFICE O/P EST LOW 20 MIN: CPT | Performed by: FAMILY MEDICINE

## 2025-03-18 ASSESSMENT — FIBROSIS 4 INDEX: FIB4 SCORE: 0.92

## 2025-03-18 NOTE — PROGRESS NOTES
"  Subjective:      54 y.o. female presents to urgent care for back pain that started 3/13/2025 after moving a bed.  She presented to the [facility] ED that same day, reports EKG, chest x-ray, and CT imaging were done, all of which were within normal limits.  She then presented to the urgent care 3/14/2025 for the symptoms.  During that visit she was given prednisone, Flexeril, and Norco for musculoskeletal chest pain.     Unfortunately she continues to experience chest wall pain, it's located in her sternum. The pain is constant but worse with certain movements, it's described as feeling sharp, currently rated 6/10. She is using tylenol and ibuprofen with some relief in symptoms. Unfortunately she was unable to  the Norco, but did take the prednisone and flexeril.     She denies any other questions or concerns at this time.    Current problem list, medication, and past medical/surgical history were reviewed in Epic.    ROS  See HPI     Objective:      /88   Pulse 77   Temp 37.1 °C (98.8 °F) (Temporal)   Resp 16   Ht 1.6 m (5' 3\")   Wt 69.4 kg (153 lb)   LMP 08/01/2013   SpO2 98%   BMI 27.10 kg/m²     Physical Exam  Constitutional:       General: She is not in acute distress.     Appearance: She is not diaphoretic.   Cardiovascular:      Rate and Rhythm: Normal rate and regular rhythm.      Heart sounds: Normal heart sounds.   Pulmonary:      Effort: Pulmonary effort is normal. No respiratory distress.      Breath sounds: Normal breath sounds.   Chest:      Comments: No discolorations or deformities noted inspection of chest wall.  She is tender to palpation over her sternum.  Neurological:      Mental Status: She is alert.   Psychiatric:         Mood and Affect: Affect normal.         Judgment: Judgment normal.       Assessment/Plan:     1. Musculoskeletal chest pain  She is encouraged to continue with heat, Tylenol, and ibuprofen as needed for symptomatic relief.        Instructed to return to " Urgent Care or nearest Emergency Department if symptoms fail to improve, for any change in condition, further concerns, or new concerning symptoms. Patient states understanding of the plan of care and discharge instructions.    Laurie Darden M.D.

## 2025-03-18 NOTE — LETTER
March 18, 2025    To Whom It May Concern:         This is confirmation that Kristina Belia Susan attended her scheduled appointment with Laurie Darden M.D. on 3/18/25. She may return to work 3/24/2025 without any restrictions.          If you have any questions please do not hesitate to call me at the phone number listed below.    Sincerely,          Laurie Darden M.D.  [phone]

## 2025-04-02 ENCOUNTER — APPOINTMENT (OUTPATIENT)
Dept: MEDICAL GROUP | Facility: CLINIC | Age: 55
End: 2025-04-02
Payer: MEDICAID

## 2025-04-02 VITALS
OXYGEN SATURATION: 98 % | HEART RATE: 64 BPM | DIASTOLIC BLOOD PRESSURE: 90 MMHG | BODY MASS INDEX: 26.37 KG/M2 | SYSTOLIC BLOOD PRESSURE: 150 MMHG | TEMPERATURE: 98.2 F | RESPIRATION RATE: 16 BRPM | WEIGHT: 148.81 LBS | HEIGHT: 63 IN

## 2025-04-02 DIAGNOSIS — Z23 NEED FOR VACCINATION: ICD-10-CM

## 2025-04-02 DIAGNOSIS — S22.21XA CLOSED FRACTURE OF MANUBRIUM, INITIAL ENCOUNTER: ICD-10-CM

## 2025-04-02 DIAGNOSIS — M54.50 CHRONIC BILATERAL LOW BACK PAIN, UNSPECIFIED WHETHER SCIATICA PRESENT: ICD-10-CM

## 2025-04-02 DIAGNOSIS — K21.9 GASTROESOPHAGEAL REFLUX DISEASE WITHOUT ESOPHAGITIS: ICD-10-CM

## 2025-04-02 DIAGNOSIS — R22.1 LUMP IN NECK: ICD-10-CM

## 2025-04-02 DIAGNOSIS — K80.20 CALCULUS OF GALLBLADDER WITHOUT CHOLECYSTITIS WITHOUT OBSTRUCTION: ICD-10-CM

## 2025-04-02 DIAGNOSIS — H81.90 VERTIGINOUS SYNDROME: ICD-10-CM

## 2025-04-02 DIAGNOSIS — Z12.11 COLON CANCER SCREENING: ICD-10-CM

## 2025-04-02 DIAGNOSIS — R74.8 HIGH ALKALINE PHOSPHATASE: ICD-10-CM

## 2025-04-02 DIAGNOSIS — G89.29 CHRONIC BILATERAL LOW BACK PAIN, UNSPECIFIED WHETHER SCIATICA PRESENT: ICD-10-CM

## 2025-04-02 DIAGNOSIS — I10 PRIMARY HYPERTENSION: ICD-10-CM

## 2025-04-02 PROCEDURE — 90472 IMMUNIZATION ADMIN EACH ADD: CPT

## 2025-04-02 PROCEDURE — 90471 IMMUNIZATION ADMIN: CPT

## 2025-04-02 PROCEDURE — 90746 HEPB VACCINE 3 DOSE ADULT IM: CPT

## 2025-04-02 PROCEDURE — 3080F DIAST BP >= 90 MM HG: CPT | Performed by: PHYSICIAN ASSISTANT

## 2025-04-02 PROCEDURE — 90677 PCV20 VACCINE IM: CPT

## 2025-04-02 PROCEDURE — 3077F SYST BP >= 140 MM HG: CPT | Performed by: PHYSICIAN ASSISTANT

## 2025-04-02 PROCEDURE — 99214 OFFICE O/P EST MOD 30 MIN: CPT | Mod: 25 | Performed by: PHYSICIAN ASSISTANT

## 2025-04-02 RX ORDER — LOSARTAN POTASSIUM 100 MG/1
100 TABLET ORAL DAILY
Qty: 100 TABLET | Refills: 3 | Status: SHIPPED | OUTPATIENT
Start: 2025-04-02 | End: 2026-05-07

## 2025-04-02 RX ORDER — AMOXICILLIN 875 MG/1
875 TABLET, COATED ORAL 2 TIMES DAILY
Qty: 20 TABLET | Refills: 0 | Status: SHIPPED | OUTPATIENT
Start: 2025-04-02

## 2025-04-02 RX ORDER — OMEPRAZOLE 40 MG/1
40 CAPSULE, DELAYED RELEASE ORAL DAILY
COMMUNITY

## 2025-04-02 SDOH — ECONOMIC STABILITY: FOOD INSECURITY: WITHIN THE PAST 12 MONTHS, YOU WORRIED THAT YOUR FOOD WOULD RUN OUT BEFORE YOU GOT MONEY TO BUY MORE.: SOMETIMES TRUE

## 2025-04-02 SDOH — ECONOMIC STABILITY: INCOME INSECURITY: HOW HARD IS IT FOR YOU TO PAY FOR THE VERY BASICS LIKE FOOD, HOUSING, MEDICAL CARE, AND HEATING?: HARD

## 2025-04-02 SDOH — HEALTH STABILITY: PHYSICAL HEALTH: ON AVERAGE, HOW MANY DAYS PER WEEK DO YOU ENGAGE IN MODERATE TO STRENUOUS EXERCISE (LIKE A BRISK WALK)?: 2 DAYS

## 2025-04-02 SDOH — ECONOMIC STABILITY: INCOME INSECURITY: IN THE LAST 12 MONTHS, WAS THERE A TIME WHEN YOU WERE NOT ABLE TO PAY THE MORTGAGE OR RENT ON TIME?: YES

## 2025-04-02 SDOH — HEALTH STABILITY: PHYSICAL HEALTH: ON AVERAGE, HOW MANY MINUTES DO YOU ENGAGE IN EXERCISE AT THIS LEVEL?: 20 MIN

## 2025-04-02 SDOH — ECONOMIC STABILITY: FOOD INSECURITY: WITHIN THE PAST 12 MONTHS, THE FOOD YOU BOUGHT JUST DIDN'T LAST AND YOU DIDN'T HAVE MONEY TO GET MORE.: SOMETIMES TRUE

## 2025-04-02 ASSESSMENT — LIFESTYLE VARIABLES
HOW MANY STANDARD DRINKS CONTAINING ALCOHOL DO YOU HAVE ON A TYPICAL DAY: 1 OR 2
AUDIT-C TOTAL SCORE: 1
SKIP TO QUESTIONS 9-10: 1
HOW OFTEN DO YOU HAVE SIX OR MORE DRINKS ON ONE OCCASION: NEVER
HOW OFTEN DO YOU HAVE A DRINK CONTAINING ALCOHOL: MONTHLY OR LESS

## 2025-04-02 ASSESSMENT — SOCIAL DETERMINANTS OF HEALTH (SDOH)
HOW HARD IS IT FOR YOU TO PAY FOR THE VERY BASICS LIKE FOOD, HOUSING, MEDICAL CARE, AND HEATING?: HARD
IN A TYPICAL WEEK, HOW MANY TIMES DO YOU TALK ON THE PHONE WITH FAMILY, FRIENDS, OR NEIGHBORS?: TWICE A WEEK
WITHIN THE PAST 12 MONTHS, YOU WORRIED THAT YOUR FOOD WOULD RUN OUT BEFORE YOU GOT THE MONEY TO BUY MORE: SOMETIMES TRUE
DO YOU BELONG TO ANY CLUBS OR ORGANIZATIONS SUCH AS CHURCH GROUPS UNIONS, FRATERNAL OR ATHLETIC GROUPS, OR SCHOOL GROUPS?: NO
HOW MANY DRINKS CONTAINING ALCOHOL DO YOU HAVE ON A TYPICAL DAY WHEN YOU ARE DRINKING: 1 OR 2
HOW OFTEN DO YOU ATTENT MEETINGS OF THE CLUB OR ORGANIZATION YOU BELONG TO?: NEVER
HOW OFTEN DO YOU ATTEND CHURCH OR RELIGIOUS SERVICES?: NEVER
HOW OFTEN DO YOU GET TOGETHER WITH FRIENDS OR RELATIVES?: ONCE A WEEK
IN THE PAST 12 MONTHS, HAS THE ELECTRIC, GAS, OIL, OR WATER COMPANY THREATENED TO SHUT OFF SERVICE IN YOUR HOME?: NO
HOW OFTEN DO YOU HAVE SIX OR MORE DRINKS ON ONE OCCASION: NEVER
DO YOU BELONG TO ANY CLUBS OR ORGANIZATIONS SUCH AS CHURCH GROUPS UNIONS, FRATERNAL OR ATHLETIC GROUPS, OR SCHOOL GROUPS?: NO
HOW OFTEN DO YOU GET TOGETHER WITH FRIENDS OR RELATIVES?: ONCE A WEEK
HOW OFTEN DO YOU ATTEND CHURCH OR RELIGIOUS SERVICES?: NEVER
HOW OFTEN DO YOU ATTENT MEETINGS OF THE CLUB OR ORGANIZATION YOU BELONG TO?: NEVER
HOW OFTEN DO YOU HAVE A DRINK CONTAINING ALCOHOL: MONTHLY OR LESS
IN A TYPICAL WEEK, HOW MANY TIMES DO YOU TALK ON THE PHONE WITH FAMILY, FRIENDS, OR NEIGHBORS?: TWICE A WEEK

## 2025-04-02 ASSESSMENT — FIBROSIS 4 INDEX: FIB4 SCORE: 0.91

## 2025-04-02 NOTE — LETTER
April 2, 2025       Patient: Kristina Davis   YOB: 1970   Date of Visit: 4/2/2025         To Whom It May Concern:    In my medical opinion, I recommend that Kristina Davis remain out of work until 4-28-25    If you have any questions or concerns, please don't hesitate to call [phone]          Sincerely,          Hermila Escobar P.A.-C.  Electronically Signed

## 2025-04-02 NOTE — LETTER
[facility]  Pcp Pt States None  No address on file  Fax: [phone]   Authorization for Release/Disclosure of   Protected Health Information   Name: KRISTINA DAVIS : 1970 SSN: [id number]   Address: [address] [place] [address]  [place] [address] Phone:    There are no phone numbers on file.   I authorize the entity listed below to release/disclose the PHI below to:   [facility]/Pcp Pt States None and Hermila Escobar P.A.-C.   Provider or Entity Name: Carolina Linares      Address   City, State, [facility]   Phone:      Fax:     Reason for request: continuity of care   Information to be released:    [  ] LAST COLONOSCOPY,  including any PATH REPORT and follow-up  [  ] LAST FIT/COLOGUARD RESULT [  ] LAST DEXA  [  ] LAST MAMMOGRAM  [  ] LAST PAP  [  ] LAST LABS [  ] RETINA EXAM REPORT  [  ] IMMUNIZATION RECORDS  [ xx ] Release all info      [  ] Check here and initial the line next to each item to release ALL health information INCLUDING  _____ Care and treatment for drug and / or alcohol abuse  _____ HIV testing, infection status, or AIDS  _____ Genetic Testing    DATES OF SERVICE OR TIME PERIOD TO BE DISCLOSED: __2018-current ___________  I understand and acknowledge that:  * This Authorization may be revoked at any time by you in writing, except if your health information has already been used or disclosed.  * Your health information that will be used or disclosed as a result of you signing this authorization could be re-disclosed by the recipient. If this occurs, your re-disclosed health information may no longer be protected by State or Federal laws.  * You may refuse to sign this Authorization. Your refusal will not affect your ability to obtain treatment.  * This Authorization becomes effective upon signing and will  on (date) __________.      If no date is indicated, this Authorization will  one (1) year from the signature date.    Name: Kristina Davis  Signature: Date:    4/2/2025     PLEASE FAX REQUESTED RECORDS BACK TO: [phone]

## 2025-04-02 NOTE — PROGRESS NOTES
cc:  establish care    Subjective:     Kristina Davis is a 54 y.o. female presenting for establish care        History of Present Illness  The patient is a 54-year-old female who presents to the office today as a new patient. She was recently seen at [facility] and diagnosed with a closed fracture of the manubrium. She had been making her bed when she felt a pop in her chest. She had been to [facility] the [facility] twice with reports of pain and was diagnosed with a fracture on 03/21/2025 at [facility].    She reports experiencing a popping sensation in her chest while changing bed sheets, which was followed by severe pain. Despite seeking medical attention at [facility], her condition was not adequately addressed. Subsequent imaging studies at [facility], including x-ray, contrast CT scan, and ultrasound, revealed a minor sternal fracture. She refutes any history of trauma. The sternal fracture occurred on 03/13/2025. She continues to experience back pain, which she attributes to the sternal fracture. She has been using cold and heat therapy for persistent swelling. Her pain management specialist has advised her against lifting objects weighing more than 5 pounds. 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 She has been managing her pain with Norco and hydrocodone with Tylenol, both of which are nearly depleted. She was also provided with a lidocaine patch and topical gel during her last visit to the pain management clinic. She discontinued gabapentin due to its ineffectiveness. She has undergone several MRIs of her back, none of which have revealed any abnormalities.    She is currently on losartan for blood pressure management, prescribed by Anthony Linaers at [facility].    She is on omeprazole for stomach issues, prescribed by Anthony Linares at [facility].    She is on meclizine for vertigo as needed.    She has a swollen gland in her neck, which  she noticed while swallowing yesterday at work.    She is under the care of a psychiatrist and is on Wellbutrin, clonazepam (as needed for anxiety and panic attacks), Caplyta, Pristiq, and hydroxyzine.    She is not aware of having gallstones.    She has a history of back surgery and tubal ligation. She is not pregnant.    She is interested in undergoing genetic testing for cancer predisposition due to her family history.    She is due for hepatitis B and pneumonia vaccines.    SOCIAL HISTORY  The patient is a current smoker. She reports occasional alcohol use. She does not use any illicit drugs.    FAMILY HISTORY  Her mother had a history of cancer and passed away 2 years ago. Her father is still alive and had a triple bypass a few years back. Her maternal grandmother had cancer and diabetes. Her aunt on her mother's side had cancer and diabetes.    ALLERGIES  She is allergic to SULFA.    MEDICATIONS  Current: Losartan, omeprazole, meclizine (as needed), Wellbutrin, clonazepam (as needed), Caplyta, Pristiq, hydroxyzine.  Discontinued: Gabapentin.    IMMUNIZATIONS  She is due for hepatitis B and pneumonia vaccines.       Review of systems:  See above.   Denies any symptoms unless previously indicated.        Current Outpatient Medications:     omeprazole (PRILOSEC) 40 MG delayed-release capsule, Take 40 mg by mouth every day., Disp: , Rfl:     losartan (COZAAR) 100 MG Tab, Take 1 Tablet by mouth every day., Disp: 100 Tablet, Rfl: 3    amoxicillin (AMOXIL) 875 MG tablet, Take 1 Tablet by mouth 2 times a day., Disp: 20 Tablet, Rfl: 0    buPROPion (WELLBUTRIN XL) 300 MG XL tablet, , Disp: , Rfl:     desvenlafaxine succinate (PRISTIQ) 50 MG TABLET SR 24 HR, TAKE 1 TABLET BY MOUTH ONCE DAILY IN THE MORNING FOR DEPRESSION, Disp: , Rfl:     CAPLYTA 21 MG Cap, TAKE 1 CAPSULE BY MOUTH ONCE DAILY AT BEDTIME FOR 30 DAYS FOR BIPOLAR., Disp: , Rfl:     hydrOXYzine HCl (ATARAX) 25 MG Tab, TAKE 1 TABLET BY MOUTH THREE TIMES DAILY  "FOR 20 DAYS AS NEEDED FOR ANXIETY, Disp: , Rfl:     clonazePAM (KLONOPIN) 0.5 MG Tab, TAKE 1 TABLET BY MOUTH EVERY DAY FOR 15 DAYS AS NEEDED FOR PANIC ATTACKS, Disp: , Rfl:     meclizine (ANTIVERT) 25 MG Tab, Take 1 Tab by mouth 3 times a day as needed for Vertigo., Disp: 30 Tab, Rfl: 0    Allergies, past medical history, past surgical history, family history, social history reviewed and updated    Objective:     Vitals: BP (!) 150/90 (BP Location: Left arm, Patient Position: Sitting, BP Cuff Size: Adult)   Pulse 64   Temp 36.8 °C (98.2 °F) (Temporal)   Resp 16   Ht 1.6 m (5' 3\")   Wt 67.5 kg (148 lb 13 oz)   LMP 08/01/2013   SpO2 98%   BMI 26.36 kg/m²   General: Alert, pleasant, NAD  EYES:   PERRL, EOMI, no icterus or pallor.  Conjunctivae and lids normal.   HENT:  Normocephalic.  External ears normal.  Neck supple.  Lump left anterior neck.  Respiratory: Normal respiratory effort.  Holding chest at sternum  Abdomen: Not obese  Skin: Warm, dry, no rashes.  Musculoskeletal: Gait is normal.  Moves all extremities well.    Neurological: No tremors, sensation grossly intact, CN2-12 intact.  Psych:  Affect/mood is normal, judgement is good, memory is intact, grooming is appropriate.    Results  Laboratory Studies  Alkaline phosphatase level was elevated. Complete blood count was normal.    Imaging  Chest x-ray showed a closed fracture of the manubrium. CT scan showed no enlarged lymph nodes.       Assessment/Plan:     Kristina was seen today for establish care.    Diagnoses and all orders for this visit:    Closed fracture of manubrium, initial encounter  -     Free Light Chains, Qnt, Ur; Future  -     UBALDO+PE SERUM +UR PROTEIN; Future  -     DS-BONE DENSITY STUDY (DEXA); Future    Calculus of gallbladder without cholecystitis without obstruction    Primary hypertension  -     losartan (COZAAR) 100 MG Tab; Take 1 Tablet by mouth every day.    Gastroesophageal reflux disease without esophagitis    Vertiginous " syndrome    Chronic bilateral low back pain, unspecified whether sciatica present  -     Free Light Chains, Qnt, Ur; Future  -     UBALDO+PE SERUM +UR PROTEIN; Future  -     DS-BONE DENSITY STUDY (DEXA); Future    High alkaline phosphatase  -     Free Light Chains, Qnt, Ur; Future  -     UBALDO+PE SERUM +UR PROTEIN; Future  -     DS-BONE DENSITY STUDY (DEXA); Future    Lump in neck  -     US-SOFT TISSUES OF HEAD - NECK; Future  -     amoxicillin (AMOXIL) 875 MG tablet; Take 1 Tablet by mouth 2 times a day.    Colon cancer screening  -     Cologuard® colon cancer screening    Need for vaccination  -     Hepatitis B Vaccine Adult 20+  -     Pneumococcal Conjugate Vaccine 20-Valent (6 wks+)        Assessment & Plan  1. Closed fracture of the manubrium.  Given the family history of multiple myeloma, further evaluation is warranted. She is under the care of pain management and has undergone multiple x-rays and MRIs of her back, along with a recent chest x-ray. 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 The note will remain effective until 04/28/2025, with the option to return earlier if necessary. Further evaluation will include obtaining labs, a bone density test, and an alkaline phosphatase level with isoenzymes. The results of these tests will be reviewed in approximately 4 to 6 weeks.    2. Primary hypertension.  Her hypertension is currently elevated, likely due to pain. The dosage of losartan will be increased from 50 mg to 100 mg.    3. Esophageal reflux.  Her condition will continue to be monitored.    4. Vertiginous syndrome.  Her condition will continue to be monitored.    5. Chronic low back pain.  She will continue to follow up with pain management.    6. High alkaline phosphatase level.  This could potentially be the cause of her bone issues. Isoenzymes will be obtained for further  evaluation.    7. Lump in neck.  This could possibly be a reactive lymph node. An ultrasound will be obtained for further evaluation, and she will be started on amoxicillin 500 mg twice a day for 10 days.    8. Colon cancer screening.  Cologuard has been ordered.    9. Need for vaccination.  Hepatitis B and pneumococcal vaccines were administered today.    10. Calculus in the gallbladder.  This was identified in previous reports. She is currently asymptomatic. Follow-up will be conducted as needed.    11. Psychiatric issues.  She is under the care of a psychiatrist and is on Wellbutrin, clonazepam (as needed for anxiety and panic attacks), Caplyta, Pristiq, and hydroxyzine.    Follow-up  The patient will follow up in approximately 4 to 6 weeks.    PROCEDURE  The patient has a history of back surgery and tubal ligation.    No follow-ups on file.    Please note that this dictation was created using voice recognition software. I have made every reasonable attempt to correct obvious errors, but expect that there are errors of grammar and possible content that I did not discover before finalizing note.

## 2025-04-07 ENCOUNTER — HOSPITAL ENCOUNTER (OUTPATIENT)
Dept: LAB | Facility: MEDICAL CENTER | Age: 55
End: 2025-04-07
Attending: PHYSICIAN ASSISTANT
Payer: MEDICAID

## 2025-04-07 DIAGNOSIS — G89.29 CHRONIC BILATERAL LOW BACK PAIN, UNSPECIFIED WHETHER SCIATICA PRESENT: ICD-10-CM

## 2025-04-07 DIAGNOSIS — R74.8 HIGH ALKALINE PHOSPHATASE: ICD-10-CM

## 2025-04-07 DIAGNOSIS — S22.21XA CLOSED FRACTURE OF MANUBRIUM, INITIAL ENCOUNTER: ICD-10-CM

## 2025-04-07 DIAGNOSIS — M54.50 CHRONIC BILATERAL LOW BACK PAIN, UNSPECIFIED WHETHER SCIATICA PRESENT: ICD-10-CM

## 2025-04-07 PROCEDURE — 84156 ASSAY OF PROTEIN URINE: CPT

## 2025-04-07 PROCEDURE — 83521 IG LIGHT CHAINS FREE EACH: CPT

## 2025-04-07 PROCEDURE — 86334 IMMUNOFIX E-PHORESIS SERUM: CPT

## 2025-04-07 PROCEDURE — 84155 ASSAY OF PROTEIN SERUM: CPT

## 2025-04-07 PROCEDURE — 82570 ASSAY OF URINE CREATININE: CPT

## 2025-04-07 PROCEDURE — 84075 ASSAY ALKALINE PHOSPHATASE: CPT

## 2025-04-07 PROCEDURE — 36415 COLL VENOUS BLD VENIPUNCTURE: CPT

## 2025-04-07 PROCEDURE — 84080 ASSAY ALKALINE PHOSPHATASES: CPT

## 2025-04-07 PROCEDURE — 84165 PROTEIN E-PHORESIS SERUM: CPT

## 2025-04-08 ENCOUNTER — RESULTS FOLLOW-UP (OUTPATIENT)
Dept: MEDICAL GROUP | Facility: CLINIC | Age: 55
End: 2025-04-08
Payer: MEDICAID

## 2025-04-08 LAB
CREAT UR-MCNC: 76.9 MG/DL
PROT UR-MCNC: 6.4 MG/DL (ref 0–15)
PROT/CREAT UR: 83 MG/G (ref 10–107)

## 2025-04-09 ENCOUNTER — APPOINTMENT (OUTPATIENT)
Dept: RADIOLOGY | Facility: MEDICAL CENTER | Age: 55
End: 2025-04-09
Payer: MEDICAID

## 2025-04-09 DIAGNOSIS — Z12.31 VISIT FOR SCREENING MAMMOGRAM: ICD-10-CM

## 2025-04-09 LAB
ALP BONE SERPL-CCNC: 46 U/L (ref 0–55)
ALP ISOS SERPL HS-CCNC: 0 U/L
ALP LIVER SERPL-CCNC: 81 U/L (ref 0–94)
ALP SERPL-CCNC: 127 U/L (ref 40–120)

## 2025-04-10 LAB
ALBUMIN SERPL ELPH-MCNC: 4.4 G/DL (ref 3.75–5.01)
ALPHA1 GLOB SERPL ELPH-MCNC: 0.26 G/DL (ref 0.19–0.46)
ALPHA2 GLOB SERPL ELPH-MCNC: 0.54 G/DL (ref 0.48–1.05)
B-GLOBULIN SERPL ELPH-MCNC: 0.8 G/DL (ref 0.48–1.1)
COLLECT DURATION TIME SPEC: NORMAL HR
GAMMA GLOB SERPL ELPH-MCNC: 0.9 G/DL (ref 0.62–1.51)
INTERPRETATION SERPL IFE-IMP: NORMAL
INTERPRETATION SERPL IFE-IMP: NORMAL
KAPPA LC FREE 24H UR-MRATE: NORMAL MG/D
KAPPA LC FREE UR-MCNC: 11.17 MG/L (ref 0–32.9)
LAMBDA LC FREE 24H UR-MRATE: NORMAL MG/D
LAMBDA LC FREE UR-MCNC: 2.11 MG/L (ref 0–3.79)
MONOCLONAL PROTEIN NL11656: NORMAL G/DL
PATHOLOGY STUDY: NORMAL
PROT 24H UR-MRATE: NORMAL MG/D
PROT SERPL-MCNC: 6.9 G/DL (ref 6.3–8.2)
SPECIMEN VOL ?TM UR: NORMAL ML

## 2025-04-16 ENCOUNTER — HOSPITAL ENCOUNTER (OUTPATIENT)
Dept: RADIOLOGY | Facility: MEDICAL CENTER | Age: 55
End: 2025-04-16
Attending: PHYSICIAN ASSISTANT
Payer: MEDICAID

## 2025-04-16 DIAGNOSIS — G89.29 CHRONIC BILATERAL LOW BACK PAIN, UNSPECIFIED WHETHER SCIATICA PRESENT: ICD-10-CM

## 2025-04-16 DIAGNOSIS — M54.50 CHRONIC BILATERAL LOW BACK PAIN, UNSPECIFIED WHETHER SCIATICA PRESENT: ICD-10-CM

## 2025-04-16 DIAGNOSIS — R74.8 HIGH ALKALINE PHOSPHATASE: ICD-10-CM

## 2025-04-16 DIAGNOSIS — S22.21XA CLOSED FRACTURE OF MANUBRIUM, INITIAL ENCOUNTER: ICD-10-CM

## 2025-04-16 PROCEDURE — 77080 DXA BONE DENSITY AXIAL: CPT

## 2025-04-17 ENCOUNTER — RESULTS FOLLOW-UP (OUTPATIENT)
Dept: MEDICAL GROUP | Facility: CLINIC | Age: 55
End: 2025-04-17
Payer: MEDICAID

## 2025-04-24 ENCOUNTER — APPOINTMENT (OUTPATIENT)
Dept: RADIOLOGY | Facility: MEDICAL CENTER | Age: 55
End: 2025-04-24
Attending: PHYSICIAN ASSISTANT
Payer: MEDICAID

## 2025-04-25 ENCOUNTER — APPOINTMENT (OUTPATIENT)
Dept: RADIOLOGY | Facility: MEDICAL CENTER | Age: 55
End: 2025-04-25
Attending: PHYSICIAN ASSISTANT
Payer: MEDICAID

## 2025-04-30 LAB — NONINV COLON CA DNA+OCC BLD SCRN STL QL: POSITIVE

## 2025-05-01 ENCOUNTER — HOSPITAL ENCOUNTER (OUTPATIENT)
Dept: RADIOLOGY | Facility: MEDICAL CENTER | Age: 55
End: 2025-05-01
Attending: PHYSICIAN ASSISTANT
Payer: MEDICAID

## 2025-05-01 ENCOUNTER — RESULTS FOLLOW-UP (OUTPATIENT)
Dept: MEDICAL GROUP | Facility: CLINIC | Age: 55
End: 2025-05-01

## 2025-05-01 DIAGNOSIS — R22.1 LUMP IN NECK: ICD-10-CM

## 2025-05-01 DIAGNOSIS — R19.5 POSITIVE COLORECTAL CANCER SCREENING USING COLOGUARD TEST: ICD-10-CM

## 2025-05-01 PROCEDURE — 76536 US EXAM OF HEAD AND NECK: CPT

## 2025-05-01 NOTE — Clinical Note
REFERRAL APPROVAL NOTICE         Sent on May 1, 2025                   Kristina Dias Mihir  [address] [facility] [address]                   Dear Ms. Arun Ash,    After a careful review of the medical information and benefit coverage, [facility] has processed your referral. See below for additional details.    If applicable, you must be actively enrolled with your insurance for coverage of the authorized service. If you have any questions regarding your coverage, please contact your insurance directly.    REFERRAL INFORMATION   Referral #:  [id number]  Referred-To Provider    Referred-By Provider:  Gastroenterology    Hermila Escobar P.A.-C.   [facility]      [facility] [id number] [phone] 0 [facility] [address]  [phone]    Referral Start Date:  05/01/2025  Referral End Date:   05/01/2026             SCHEDULING  If you do not already have an appointment, please call [phone] to make an appointment.     MORE INFORMATION  If you do not already have a [employer] account, sign up at: [email or web address]  You can access your medical information, make appointments, see lab results, billing information, and more.  If you have questions regarding this referral, please contact  the [facility] Referrals department at:             [phone]. Monday - Friday 8:00AM - 5:00PM.     Sincerely,    [facility]

## 2025-05-01 NOTE — PROGRESS NOTES
Patient has a positive cologuard test.  This does not mean she has colon cancer but it does mean we need to evaluate further.  I am sending an urgent referral to gastroenterology so that she can discuss having a colonoscopy.  If she would like to discuss, okay to schedule an appointment to discuss further.

## 2025-05-05 ENCOUNTER — OFFICE VISIT (OUTPATIENT)
Dept: MEDICAL GROUP | Facility: CLINIC | Age: 55
End: 2025-05-05
Payer: MEDICAID

## 2025-05-05 VITALS
TEMPERATURE: 97.5 F | HEIGHT: 63 IN | BODY MASS INDEX: 26.88 KG/M2 | HEART RATE: 81 BPM | WEIGHT: 151.68 LBS | OXYGEN SATURATION: 96 % | SYSTOLIC BLOOD PRESSURE: 132 MMHG | DIASTOLIC BLOOD PRESSURE: 70 MMHG | RESPIRATION RATE: 16 BRPM

## 2025-05-05 DIAGNOSIS — M85.89 OSTEOPENIA OF MULTIPLE SITES: ICD-10-CM

## 2025-05-05 DIAGNOSIS — M54.2 NECK PAIN: ICD-10-CM

## 2025-05-05 DIAGNOSIS — D49.2 GROWTH OF EYELID: ICD-10-CM

## 2025-05-05 DIAGNOSIS — R74.8 HIGH ALKALINE PHOSPHATASE: ICD-10-CM

## 2025-05-05 DIAGNOSIS — R19.5 POSITIVE COLORECTAL CANCER SCREENING USING COLOGUARD TEST: ICD-10-CM

## 2025-05-05 DIAGNOSIS — G89.29 CHRONIC MIDLINE THORACIC BACK PAIN: ICD-10-CM

## 2025-05-05 DIAGNOSIS — M54.6 CHRONIC MIDLINE THORACIC BACK PAIN: ICD-10-CM

## 2025-05-05 DIAGNOSIS — R22.1 LUMP IN NECK: ICD-10-CM

## 2025-05-05 DIAGNOSIS — S22.21XD CLOSED FRACTURE OF MANUBRIUM WITH ROUTINE HEALING, SUBSEQUENT ENCOUNTER: ICD-10-CM

## 2025-05-05 PROBLEM — M85.80 OSTEOPENIA: Status: ACTIVE | Noted: 2025-05-05

## 2025-05-05 PROCEDURE — 99214 OFFICE O/P EST MOD 30 MIN: CPT | Performed by: PHYSICIAN ASSISTANT

## 2025-05-05 PROCEDURE — 3078F DIAST BP <80 MM HG: CPT | Performed by: PHYSICIAN ASSISTANT

## 2025-05-05 PROCEDURE — 3075F SYST BP GE 130 - 139MM HG: CPT | Performed by: PHYSICIAN ASSISTANT

## 2025-05-05 RX ORDER — CYCLOBENZAPRINE HCL 10 MG
TABLET ORAL
COMMUNITY
Start: 2025-04-10

## 2025-05-05 RX ORDER — TRAMADOL HYDROCHLORIDE 50 MG/1
50 TABLET ORAL 2 TIMES DAILY PRN
COMMUNITY
Start: 2025-04-17

## 2025-05-05 ASSESSMENT — FIBROSIS 4 INDEX: FIB4 SCORE: 0.91

## 2025-05-05 NOTE — PROGRESS NOTES
cc:  test results    Subjective:     Kristina Ash is a 54 y.o. female presenting for test results        History of Present Illness  The patient is a 54-year-old female who presents to the office today to follow up with her most recent test results. She had labs done due to a fractured sternum and an elevated alkaline phosphatase level, which remains high. Isoenzymes and kappa light chains were also ordered and returned normal. A bone density test was conducted due to the sternal fracture, revealing osteopenia. An ultrasound of the neck for a lump showed mildly prominent but nonenlarged bilateral lymph nodes in the submandibular region. She also had a Cologuard test, which was positive.    She reports experiencing back pain, which she rates as 7 to 8 on a scale of 10. The pain is intermittent but has become more noticeable since her sternal fracture.    [facility] and has scheduled an appointment with [facility]. She has not undergone a colonoscopy to date.    She frequently experiences swollen glands and bacterial infections, although she is uncertain if these are related to her current condition. She does not believe she has seasonal allergies. She is not currently taking amoxicillin and does not report any current gland swelling.    She has observed a growth on her eye, which she initially believed to be a stye. The growth has been present for several years and has slightly increased in size.    She also reports frequent urination.       Review of systems:  See above.   Denies any symptoms unless previously indicated.        Current Outpatient Medications:     traMADol (ULTRAM) 50 MG Tab, Take 50 mg by mouth 2 times a day as needed., Disp: , Rfl:     cyclobenzaprine (FLEXERIL) 10 MG Tab, TAKE 1 TABLET BY MOUTH ONCE DAILY AS NEEDED FOR LOWER BACK PAIN, Disp: , Rfl:     omeprazole (PRILOSEC) 40 MG delayed-release capsule, Take 40 mg by mouth every day., Disp: , Rfl:      "losartan (COZAAR) 100 MG Tab, Take 1 Tablet by mouth every day., Disp: 100 Tablet, Rfl: 3    buPROPion (WELLBUTRIN XL) 300 MG XL tablet, , Disp: , Rfl:     desvenlafaxine succinate (PRISTIQ) 50 MG TABLET SR 24 HR, TAKE 1 TABLET BY MOUTH ONCE DAILY IN THE MORNING FOR DEPRESSION, Disp: , Rfl:     CAPLYTA 21 MG Cap, TAKE 1 CAPSULE BY MOUTH ONCE DAILY AT BEDTIME FOR 30 DAYS FOR BIPOLAR., Disp: , Rfl:     hydrOXYzine HCl (ATARAX) 25 MG Tab, TAKE 1 TABLET BY MOUTH THREE TIMES DAILY FOR 20 DAYS AS NEEDED FOR ANXIETY, Disp: , Rfl:     clonazePAM (KLONOPIN) 0.5 MG Tab, TAKE 1 TABLET BY MOUTH EVERY DAY FOR 15 DAYS AS NEEDED FOR PANIC ATTACKS, Disp: , Rfl:     meclizine (ANTIVERT) 25 MG Tab, Take 1 Tab by mouth 3 times a day as needed for Vertigo., Disp: 30 Tab, Rfl: 0    Allergies, past medical history, past surgical history, family history, social history reviewed and updated    Objective:     Vitals: /70 (BP Location: Left arm, Patient Position: Sitting, BP Cuff Size: Adult)   Pulse 81   Temp 36.4 °C (97.5 °F) (Temporal)   Resp 16   Ht 1.6 m (5' 3\")   Wt 68.8 kg (151 lb 10.8 oz)   LMP 08/01/2013   SpO2 96%   BMI 26.87 kg/m²   General: Alert, pleasant, NAD  EYES:   PERRL, EOMI, no icterus or pallor.  Conjunctivae and lids normal. Growth 1 mm in diameter inner upper right eyelid  HENT:  Normocephalic.  External ears normal.  Neck supple.     Respiratory: Normal respiratory effort.    Abdomen: Not obese  Skin: Warm, dry, no rashes.  Musculoskeletal: Gait is normal.  Moves all extremities well.    Neurological: No tremors, sensation grossly intact,  CN2-12 intact.  Psych:  Affect/mood is normal, judgement is good, memory is intact, grooming is appropriate.    Physical Exam  Neck: Mildly prominent but nonenlarged bilateral lymph nodes in the submandibular region       Results    US-SOFT TISSUES OF HEAD - NECK  Order: [id number]   Status: Final result       Next appt: 05/07/2025 at 10:30 AM in Surgical Oncology " (Maria E Reeder M.D.)       Dx: Lump in neck    Test Result Released: Yes (seen)       Messages: Seen    1 Result Note       1 Patient Communication       View Follow-Up Encounter  Details    Reading Physician Reading Date Result Priority   Marcelino Ferris M.D.  [phone]     5/1/2025      Narrative & Impression     5/1/2025 1:59 PM     HISTORY/REASON FOR EXAM:  Mass/Lump        TECHNIQUE/EXAM DESCRIPTION:  Ultrasound of the soft tissues of the head and neck.     COMPARISON:  None     FINDINGS:  Focused ultrasound of the area of concern bilateral submandibular regions demonstrates mildly prominent but nonenlarged bilateral lymph nodes. The largest on the right measure 3.3 x 0.5 cm. The largest on the left measure 1.9 x 0.7 centimeter.     No mass, fluid collection or hematoma.              IMPRESSION:     Mildly prominent but nonenlarged bilateral lymph nodes in the submandibular regions.   DS-BONE DENSITY STUDY (DEXA)  Order: [id number]   Status: Final result       Next appt: 05/07/2025 at 10:30 AM in Surgical Oncology (Maria E Reeder M.D.)       Dx: Closed fracture of manubrium, initial...    Test Result Released: Yes (seen)       Messages: Seen    1 Result Note       1 Patient Communication       View Follow-Up Encounter  Details    Reading Physician Reading Date Result Priority   Marcelino Ferris M.D.  [phone]     4/17/2025      Narrative & Impression     4/16/2025 4:57 PM     HISTORY/REASON FOR EXAM:  Postmenopausal, tobacco use, sternum fracture without trauma, family history of osteoporosis     TECHNIQUE/EXAM DESCRIPTION AND NUMBER OF VIEWS:  Dual x-ray bone densitometry was performed from the L2 through L4 levels and from the proximal right femur utilizing the GE Prodigy unit.     COMPARISON: None     FINDINGS:  The lumbar spine has a mean bone mineral density of 0.908 g/cm2, with a T score of -2.4 and a Z score of -1.6.     The proximal right femur has a mean bone mineral density of 0.738 g/cm2,  with a T score of -2.1 and a Z score of -1.4.        IMPRESSION:     According to the World Health Organization classification, bone mineral density of this patient is osteopenia in the lumbar spine and right femur.     10-year Probability of Fracture:  Major Osteoporotic     19.0%  Hip     5.9%  Population      [place] ()     Based on right femur neck BMD         Laboratory Studies  Alkaline phosphatase level is elevated. Isoenzymes are normal. Kappa light chains are normal. Cologuard test is positive.    Imaging  Bone density test shows osteopenia. Ultrasound of the neck shows mildly prominent but nonenlarged bilateral lymph nodes in the submandibular region.      Latest Reference Range & Units 04/07/25 13:23   Alkaline Phosphatase 40 - 120 U/L 127 (H)   Bone Fractions 0 - 55 U/L 46   Liver Fractions 0 - 94 U/L 81   Alk Phos Other Calc U/L 0   Creatinine, Random Urine mg/dL 76.90   Total Protein, Urine <=150 mg/d  0.0 - 15.0 mg/dL See Note  6.4   Protein Creatinine Ratio 10 - 107 mg/g 83   Collection Length hr Random   Total Volume mL Random   Interpretation  See Note   Albumin 3.75 - 5.01 g/dL 4.40   Gamma Globulin 0.62 - 1.51 g/dL 0.90   Alpha-1 Globulin 0.19 - 0.46 g/dL 0.26   Alpha-2 Globulin 0.48 - 1.05 g/dL 0.54   Beta Globulin 0.48 - 1.10 g/dL 0.80   Free Urinary Kappa Excretion/Day mg/d See Note   Free Kappa Light Chains 0.00 - 32.90 mg/L 11.17   Free Lambda Light Chains 0.00 - 3.79 mg/L 2.11   Free Lambda Excretion/Day mg/d See Note   Immunofixation  UBALDO Done   Monoclonal Protein <=0.00 g/dL Not Applicable   EER Monoclonal Protein Study, Ser  See Note   Total Protein, Serum 6.3 - 8.2 g/dL 6.9   (H): Data is abnormally high    Assessment/Plan:     Kristina was seen today for results.    Diagnoses and all orders for this visit:    Positive colorectal cancer screening using Cologuard test    High alkaline phosphatase  -     HEPATIC FUNCTION PANEL; Future    Closed fracture of manubrium with routine healing,  subsequent encounter  -     DX-CERVICAL SPINE-2 OR 3 VIEWS; Future  -     DX-THORACIC SPINE-2 VIEWS; Future    Lump in neck  -     US-SOFT TISSUES OF HEAD - NECK; Future    Osteopenia of multiple sites  -     DX-CERVICAL SPINE-2 OR 3 VIEWS; Future  -     DX-THORACIC SPINE-2 VIEWS; Future    Chronic midline thoracic back pain  -     DX-THORACIC SPINE-2 VIEWS; Future    Neck pain  -     DX-CERVICAL SPINE-2 OR 3 VIEWS; Future    Growth of eyelid  -     Referral to Ophthalmology        Assessment & Plan  1. Positive Cologuard test.  - The Cologuard test returned positive, necessitating further evaluation to rule out colon cancer.  - [facility] has been made for a colonoscopy.  - She has been informed that a positive Cologuard result does not definitively indicate colon cancer, but it does warrant additional investigation.  - She will follow up with the [facility] for the colonoscopy.    2. Elevated alkaline phosphatase level.  - The alkaline phosphatase level remains elevated, but isoenzymes and kappa light chains are normal, ruling out liver and bone as the sources of elevation.  - A liver panel will be rechecked in 3 months to monitor the alkaline phosphatase level.  - The elevation in alkaline phosphatase remains unexplained despite normal isoenzymes and kappa light chains.  - A recheck of the liver panel in 3 months is planned to monitor the alkaline phosphatase level.    3. Osteopenia.  - A bone density test revealed osteopenia, which may have contributed to her sternal fracture.  - She is advised to start a calcium supplement, aiming for a total daily intake of 2400 mg, including dietary sources.  - She will monitor her calcium intake from food and adjust the supplement accordingly.  - Calcium supplementation is recommended to address the osteopenia and prevent further bone thinning.    4. Prominent but nonenlarged bilateral submandibular lymph nodes.  - An ultrasound of the neck showed  mildly prominent but nonenlarged bilateral lymph nodes in the submandibular region.  - This could be related to frequent bacterial infections or allergies.  - She reported improvement with amoxicillin previously.  - An ultrasound of the neck will be repeated in 3 months to monitor any changes.    5. Back pain/manubrium fracture.  - She reports significant back pain, rated up to 7-8/10, which has been more prominent since her sternal fracture.  - X-rays of the neck and mid-back will be ordered to investigate the cause of the pain.  - The pain has been more prominent since the sternal fracture.  - X-rays of the neck and mid-back will be ordered to investigate the cause of the pain.    6. Eye growth.  - She has a growth on her eye that has been present for a couple of years and has grown slightly.  - A referral to an ophthalmologist will be made for further evaluation and potential removal of the growth.  - The growth has been present for a couple of years and has increased in size slightly.  - Referral to an ophthalmologist for further evaluation and potential removal of the growth.    Follow-up  The patient will follow up in 3 months or sooner if necessary.    Return in about 3 months (around 8/5/2025), or if symptoms worsen or fail to improve, for sooner if needed.  .    Please note that this dictation was created using voice recognition software. I have made every reasonable attempt to correct obvious errors, but expect that there are errors of grammar and possible content that I did not discover before finalizing note.

## 2025-05-07 ENCOUNTER — APPOINTMENT (OUTPATIENT)
Dept: MEDICAL GROUP | Facility: CLINIC | Age: 55
End: 2025-05-07
Payer: MEDICAID

## 2025-05-08 ENCOUNTER — TELEPHONE (OUTPATIENT)
Facility: MEDICAL CENTER | Age: 55
End: 2025-05-08

## 2025-05-08 NOTE — TELEPHONE ENCOUNTER
LM on VM asking Kristina to call back and reschedule her missed appointment with Dr. Reeder for her positive Cologuard test.

## 2025-05-09 NOTE — Clinical Note
REFERRAL APPROVAL NOTICE         Sent on May 9, 2025                   Kristina Dias Mihir  [address] [facility] [address]                   Dear Ms. Arun Ash,    After a careful review of the medical information and benefit coverage, [facility] has processed your referral. See below for additional details.    If applicable, you must be actively enrolled with your insurance for coverage of the authorized service. If you have any questions regarding your coverage, please contact your insurance directly.    REFERRAL INFORMATION   Referral #:  [id number]  Referred-To Provider    Referred-By Provider:  Ophthalmology    Hermila Escobar P.A.-C.   [facility]      [address]  [facility] [address]  [phone] [address] [place] [address]  [phone]    Referral Start Date:  05/05/2025  Referral End Date:   05/05/2026             SCHEDULING  If you do not already have an appointment, please call [phone] to make an appointment.     MORE INFORMATION  If you do not already have a [employer] account, sign up at: [email or web address]  You can access your medical information, make appointments, see lab results, billing information, and more.  If you have questions regarding this referral, please contact  the [facility] Referrals department at:             [phone]. Monday - Friday 8:00AM - 5:00PM.     Sincerely,    [facility]

## 2025-05-24 ENCOUNTER — HOSPITAL ENCOUNTER (OUTPATIENT)
Dept: RADIOLOGY | Facility: MEDICAL CENTER | Age: 55
End: 2025-05-24
Attending: PHYSICIAN ASSISTANT
Payer: MEDICAID

## 2025-05-24 ENCOUNTER — OFFICE VISIT (OUTPATIENT)
Dept: URGENT CARE | Facility: PHYSICIAN GROUP | Age: 55
End: 2025-05-24
Payer: MEDICAID

## 2025-05-24 VITALS
SYSTOLIC BLOOD PRESSURE: 142 MMHG | RESPIRATION RATE: 18 BRPM | TEMPERATURE: 97.2 F | OXYGEN SATURATION: 97 % | DIASTOLIC BLOOD PRESSURE: 84 MMHG | BODY MASS INDEX: 26.17 KG/M2 | HEART RATE: 102 BPM | WEIGHT: 147.71 LBS

## 2025-05-24 DIAGNOSIS — S22.21XD CLOSED FRACTURE OF MANUBRIUM WITH ROUTINE HEALING, SUBSEQUENT ENCOUNTER: ICD-10-CM

## 2025-05-24 DIAGNOSIS — M85.89 OSTEOPENIA OF MULTIPLE SITES: ICD-10-CM

## 2025-05-24 DIAGNOSIS — M54.6 CHRONIC MIDLINE THORACIC BACK PAIN: ICD-10-CM

## 2025-05-24 DIAGNOSIS — F17.200 CURRENT EVERY DAY SMOKER: Primary | ICD-10-CM

## 2025-05-24 DIAGNOSIS — J44.1 ACUTE EXACERBATION OF CHRONIC OBSTRUCTIVE PULMONARY DISEASE (COPD) (HCC): ICD-10-CM

## 2025-05-24 DIAGNOSIS — G89.29 CHRONIC MIDLINE THORACIC BACK PAIN: ICD-10-CM

## 2025-05-24 DIAGNOSIS — M54.2 NECK PAIN: ICD-10-CM

## 2025-05-24 PROCEDURE — 3079F DIAST BP 80-89 MM HG: CPT | Performed by: NURSE PRACTITIONER

## 2025-05-24 PROCEDURE — 72040 X-RAY EXAM NECK SPINE 2-3 VW: CPT

## 2025-05-24 PROCEDURE — 3077F SYST BP >= 140 MM HG: CPT | Performed by: NURSE PRACTITIONER

## 2025-05-24 PROCEDURE — 99213 OFFICE O/P EST LOW 20 MIN: CPT | Performed by: NURSE PRACTITIONER

## 2025-05-24 PROCEDURE — 72070 X-RAY EXAM THORAC SPINE 2VWS: CPT

## 2025-05-24 RX ORDER — DOXYCYCLINE HYCLATE 100 MG
100 TABLET ORAL 2 TIMES DAILY
Qty: 14 TABLET | Refills: 0 | Status: SHIPPED | OUTPATIENT
Start: 2025-05-24 | End: 2025-05-31

## 2025-05-24 RX ORDER — ALBUTEROL SULFATE 90 UG/1
2 INHALANT RESPIRATORY (INHALATION) EVERY 4 HOURS PRN
Qty: 1 EACH | Refills: 0 | Status: SHIPPED | OUTPATIENT
Start: 2025-05-24 | End: 2025-06-07

## 2025-05-24 RX ORDER — PREDNISONE 20 MG/1
40 TABLET ORAL DAILY
Qty: 10 TABLET | Refills: 0 | Status: SHIPPED | OUTPATIENT
Start: 2025-05-24 | End: 2025-05-29

## 2025-05-24 RX ORDER — DEXTROMETHORPHAN HYDROBROMIDE AND PROMETHAZINE HYDROCHLORIDE 15; 6.25 MG/5ML; MG/5ML
5 SYRUP ORAL EVERY 4 HOURS PRN
Qty: 120 ML | Refills: 0 | Status: SHIPPED | OUTPATIENT
Start: 2025-05-24 | End: 2025-05-31

## 2025-05-24 ASSESSMENT — ENCOUNTER SYMPTOMS: COUGH: 1

## 2025-05-24 ASSESSMENT — FIBROSIS 4 INDEX: FIB4 SCORE: 0.92

## 2025-05-24 NOTE — PROGRESS NOTES
Subjective:     Kristina Ash is a 55 y.o. female who presents for Cough (Cough, congestion, headache X 2 weeks)      Cough      Pt presents for evaluation of a new problem.  Kristina is a very pleasant 55-year-old female who presents to urgent care today with complaints of an ongoing cough and congestion for the past 2 weeks.  Her cough has progressively worsened and she is now experiencing shortness of breath.  She does suffer from COPD and up until recently has been a 1 pack/day smoker.  She does need a new albuterol inhaler.  She has been taking over-the-counter cough and cold for relief of her symptoms.  Negative for nausea/vomiting or diarrhea.    Review of Systems   Respiratory:  Positive for cough.        PMH: Past Medical History[1]  ALLERGIES: Allergies[2]  SURGHX: Past Surgical History[3]  SOCHX: Social History[4]  FH:   Family History   Problem Relation Age of Onset    Cancer Mother     Heart Disease Father         bypass    Diabetes Maternal Aunt     Cancer Maternal Aunt     Diabetes Maternal Grandmother     Cancer Maternal Grandmother          Objective:   BP (!) 142/84 (BP Location: Left arm, Patient Position: Sitting, BP Cuff Size: Adult)   Pulse (!) 102   Temp 36.2 °C (97.2 °F) (Temporal)   Wt 67 kg (147 lb 11.3 oz)   LMP 08/01/2013   SpO2 97%   BMI 26.17 kg/m²     Physical Exam  Vitals and nursing note reviewed.   Constitutional:       General: She is not in acute distress.     Appearance: Normal appearance. She is normal weight. She is ill-appearing. She is not toxic-appearing.   HENT:      Head: Normocephalic.      Right Ear: Tympanic membrane, ear canal and external ear normal.      Left Ear: Tympanic membrane, ear canal and external ear normal.      Nose: Congestion present. No rhinorrhea.      Mouth/Throat:      Pharynx: No oropharyngeal exudate or posterior oropharyngeal erythema.   Eyes:      General:         Right eye: No discharge.         Left eye: No discharge.      Pupils:  Pupils are equal, round, and reactive to light.   Cardiovascular:      Rate and Rhythm: Tachycardia present.   Pulmonary:      Effort: Pulmonary effort is normal. No respiratory distress.      Breath sounds: No stridor. No wheezing, rhonchi or rales.   Chest:      Chest wall: No tenderness.   Abdominal:      General: Abdomen is flat.   Musculoskeletal:         General: Normal range of motion.      Cervical back: Normal range of motion and neck supple.   Skin:     General: Skin is dry.   Neurological:      General: No focal deficit present.      Mental Status: She is alert and oriented to person, place, and time. Mental status is at baseline.   Psychiatric:         Mood and Affect: Mood normal.         Behavior: Behavior normal.         Thought Content: Thought content normal.         Judgment: Judgment normal.         Assessment/Plan:   Assessment    1. Current every day smoker        2. Acute exacerbation of chronic obstructive pulmonary disease (COPD) ([facility])  doxycycline (VIBRAMYCIN) 100 MG Tab    predniSONE (DELTASONE) 20 MG Tab    promethazine-dextromethorphan (PROMETHAZINE-DM) 6.25-15 MG/5ML syrup    albuterol 108 (90 Base) MCG/ACT Aero Soln inhalation aerosol        We discussed supportive measures including humidifier, warm salt water gargles, over-the-counter Cepacol throat lozenges, rest  and increased fluids. Pt was encouraged to seek treatment back in the ER or urgent care for worsening symptoms,  fever greater than 100.5, wheezes or shortness of breath.         [1]   Past Medical History:  Diagnosis Date    Backache S/P L4-5    decompressive laminectomies with bilateral foramionotomies    Bipolar I disorder, most recent episode (or current) unspecified     untreated      Cough     Displacement of lumbar intervertebral disc without myelopathy     right sided sciatica    Dizziness and giddiness     Irregular menstrual cycle     menorrhagia lifelong    Lumbar pain  MRI   9/2008    MRI (9/2008) Left lateral  disk protusion L5-S1 with left S1 root compression,small central disk  protusion at the L4-L5 with effacement of the ventral sac and small radial tear of the annulus as well as mild right lateral disk bulge at L4-L5 with neuro foraminal stenosis.    Need for Td vaccine  needed    Osteoarthrosis involving, or with mention of more than one site, but not specified as generalized, multiple sites     Pap smear  1990    Pneumonia hx.    Screening mammogram  1998    Tobacco use disorder    [2]   Allergies  Allergen Reactions    Sulfa Drugs Hives and Anaphylaxis     Causes angioderma and hives   [3]   Past Surgical History:  Procedure Laterality Date    LUMBAR LAMINECTOMY DISKECTOMY  3/18/2009    [facility]    TUBAL COAGULATION LAPAROSCOPIC BILATERAL     [4]   Social History  Socioeconomic History    Marital status: Other    Highest education level: Some college, no degree   Tobacco Use    Smoking status: Every Day     Current packs/day: 0.25     Average packs/day: 0.3 packs/day for 30.0 years (7.5 ttl pk-yrs)     Types: Cigarettes    Smokeless tobacco: Never   Vaping Use    Vaping status: Some Days    Substances: Nicotine   Substance and Sexual Activity    Alcohol use: Yes     Comment: ocassional    Drug use: No     Comment: none currently but in the past did.    Sexual activity: Not Currently     Comment:      Social Drivers of Health     Financial Resource Strain: High Risk (4/18/2025)    Overall Financial Resource Strain (CARDIA)     Difficulty of Paying Living Expenses: Hard   Food Insecurity: Food Insecurity Present (4/18/2025)    Hunger Vital Sign     Worried About Running Out of Food in the Last Year: Sometimes true     Ran Out of Food in the Last Year: Sometimes true   Transportation Needs: No Transportation Needs (4/18/2025)    PRAPARE - Transportation     Lack of Transportation (Medical): No     Lack of Transportation (Non-Medical): No   Physical Activity:  Insufficiently Active (4/18/2025)    Exercise Vital Sign     Days of Exercise per Week: 2 days     Minutes of Exercise per Session: 20 min   Stress: Stress Concern Present (4/18/2025)    [employer] - Occupational Stress Questionnaire     Feeling of Stress : Rather much   Social Connections: Socially Isolated (4/18/2025)    Social Connection and Isolation Panel [NHANES]     Frequency of Communication with Friends and Family: Once a week     Frequency of Social Gatherings with Friends and Family: Never     Attends Buddhism Services: Never     Active Member of Clubs or Organizations: No     Attends Club or Organization Meetings: Never     Marital Status:    Housing Stability: High Risk (4/18/2025)    Housing Stability Vital Sign     Unable to Pay for Housing in the Last Year: Yes     Number of Times Moved in the Last Year: 1     Homeless in the Last Year: No

## 2025-05-27 ENCOUNTER — RESULTS FOLLOW-UP (OUTPATIENT)
Dept: MEDICAL GROUP | Facility: CLINIC | Age: 55
End: 2025-05-27

## 2025-05-30 ENCOUNTER — HOSPITAL ENCOUNTER (OUTPATIENT)
Dept: RADIOLOGY | Facility: MEDICAL CENTER | Age: 55
End: 2025-05-30
Attending: PHYSICIAN ASSISTANT
Payer: MEDICAID

## 2025-05-30 DIAGNOSIS — Z12.31 VISIT FOR SCREENING MAMMOGRAM: ICD-10-CM

## 2025-05-30 PROCEDURE — 77067 SCR MAMMO BI INCL CAD: CPT
